# Patient Record
Sex: MALE | Race: ASIAN | NOT HISPANIC OR LATINO | Employment: FULL TIME | URBAN - METROPOLITAN AREA
[De-identification: names, ages, dates, MRNs, and addresses within clinical notes are randomized per-mention and may not be internally consistent; named-entity substitution may affect disease eponyms.]

---

## 2017-02-12 ENCOUNTER — OFFICE VISIT (OUTPATIENT)
Dept: URGENT CARE | Facility: PHYSICIAN GROUP | Age: 27
End: 2017-02-12
Payer: COMMERCIAL

## 2017-02-12 ENCOUNTER — HOSPITAL ENCOUNTER (OUTPATIENT)
Facility: MEDICAL CENTER | Age: 27
End: 2017-02-12
Attending: NURSE PRACTITIONER
Payer: COMMERCIAL

## 2017-02-12 VITALS
TEMPERATURE: 98 F | BODY MASS INDEX: 23.98 KG/M2 | OXYGEN SATURATION: 96 % | RESPIRATION RATE: 16 BRPM | WEIGHT: 177 LBS | DIASTOLIC BLOOD PRESSURE: 68 MMHG | SYSTOLIC BLOOD PRESSURE: 108 MMHG | HEIGHT: 72 IN | HEART RATE: 70 BPM

## 2017-02-12 DIAGNOSIS — Z20.2 POSSIBLE EXPOSURE TO STD: ICD-10-CM

## 2017-02-12 LAB
AMBIGUOUS DTTM AMBI4: NORMAL
APPEARANCE UR: CLEAR
BILIRUB UR STRIP-MCNC: NORMAL MG/DL
COLOR UR AUTO: YELLOW
GLUCOSE UR STRIP.AUTO-MCNC: NORMAL MG/DL
KETONES UR STRIP.AUTO-MCNC: NORMAL MG/DL
LEUKOCYTE ESTERASE UR QL STRIP.AUTO: NORMAL
NITRITE UR QL STRIP.AUTO: NORMAL
PH UR STRIP.AUTO: 6.5 [PH] (ref 5–8)
PROT UR QL STRIP: NORMAL MG/DL
RBC UR QL AUTO: NORMAL
SIGNIFICANT IND 70042: NORMAL
SOURCE SOURCE: NORMAL
SP GR UR STRIP.AUTO: 1
UROBILINOGEN UR STRIP-MCNC: NORMAL MG/DL

## 2017-02-12 PROCEDURE — 81002 URINALYSIS NONAUTO W/O SCOPE: CPT | Performed by: NURSE PRACTITIONER

## 2017-02-12 PROCEDURE — 99203 OFFICE O/P NEW LOW 30 MIN: CPT | Performed by: NURSE PRACTITIONER

## 2017-02-12 PROCEDURE — 87491 CHLMYD TRACH DNA AMP PROBE: CPT

## 2017-02-12 PROCEDURE — 87591 N.GONORRHOEAE DNA AMP PROB: CPT

## 2017-02-12 NOTE — PROGRESS NOTES
Chief Complaint   Patient presents with   • Exposure to STD     exposure to std       HISTORY OF PRESENT ILLNESS: Patient is a 26 y.o. male who presents today requesting to be tested for chlamydia. He reports he has a history of nancy chlamydia while living in [place]. Both him and his girlfriend tested positive for chlaymida two years ago and was told it was possibly contracted from dirty water conditions. He is concerned because he just spent a month there, returning this week. He was tested just prior to departure from [place], but was not given a clear result. Therefore would like to be tested and treated appropriately today. He reports he has mild amount of penile discomfort, but states this develops shortly after testing process was done in [place]. He denies associated discharge, drainage, dysuria.       There are no active problems to display for this patient.      Allergies:Review of patient's allergies indicates no known allergies.    No current Epic-ordered outpatient prescriptions on file.     No current Epic-ordered facility-administered medications on file.       History reviewed. No pertinent past medical history.    Social History   Substance Use Topics   • Smoking status: Never Smoker    • Smokeless tobacco: None   • Alcohol Use: Yes       No family status information on file.   History reviewed. No pertinent family history.    ROS:  Review of Systems   Constitutional: Negative for fever, chills, weight loss and malaise/fatigue.   HENT: Negative for ear pain, nosebleeds, congestion, sore throat and neck pain.    Neuro: Negative for headache, sensory changes, weakness, seizure, LOC.   Cardiovascular: Negative for chest pain, palpitations, orthopnea and leg swelling.   Respiratory: Negative for cough, sputum production, shortness of breath and wheezing.   Gastrointestinal: Negative for abdominal pain, nausea, vomiting or diarrhea.   Genitourinary: Positive for mild penile discomfort. Negative for  dysuria, urgency and frequency.  Musculoskeletal: Negative for falls, neck pain, back pain, joint pain, myalgias.   Skin: Negative for rash, diaphoresis.     Exam:  Blood pressure 108/68, pulse 70, temperature 36.7 °C (98 °F), resp. rate 16, height 1.829 m (6'), weight 80.287 kg (177 lb), SpO2 96 %.  General: well-nourished, well-developed male in NAD  Head: normocephalic, atraumatic  Eyes: PERRLA, no conjunctival injection, acuity grossly intact, lids normal.  Ears: normal shape and symmetry, gross auditory acuity is intact.  Nose: symmetrical without tenderness, no discharge.  Mouth/Throat: reasonable hygiene, no erythema, exudates or tonsillar enlargement.  Neck: no masses, range of motion within normal limits, no tracheal deviation. No obvious thyroid enlargement.   Lymph: no cervical adenopathy. No supraclavicular adenopathy.   Neuro: alert and oriented. Cranial nerves 1-12 grossly intact. No sensory deficit.   Cardiovascular: regular rate and rhythm. No edema.  Pulmonary: no distress. Chest is symmetrical with respiration, no wheezes, crackles, or rhonchi.   Abdomen: soft, non-tender, no guarding, no hepatosplenomegaly.  Musculoskeletal: no clubbing, appropriate muscle tone, gait is stable.  Skin: warm, dry, intact, no clubbing, no cyanosis, no rashes.   Psych: appropriate mood, affect, judgement.         Assessment/Plan:  1. Possible exposure to STD  POCT Urinalysis    CHLAMYDIA/GC PCR URINE OR SWAB       POC urine color yellow, appearance clear, leukocytes negative, nitrites negative, protein negative, pH 6.5, blood trace, specific gravity 1.005, ketones negative, bilirubin negative, glucose negative.         The patient's urine is negative for suspected infectious process. Will be sent for gonorrhea and chlamydia, will treat accordingly to results. Refrain from sexual activity until results received.  Supportive care, differential diagnoses, and indications for immediate follow-up discussed with patient.    Pathogenesis of diagnosis discussed including typical length and natural progression.   Instructed to return to clinic or nearest emergency department for any change in condition, further concerns, or worsening of symptoms.  Patient states understanding of the plan of care and discharge instructions.  Instructed to make an appointment, for follow up, with their primary care provider.        Please note that this dictation was created using voice recognition software. I have made every reasonable attempt to correct obvious errors, but I expect that there are errors of grammar and possibly content that I did not discover before finalizing the note.      MUNA Hollingsworth.         Please call s/o with results: [phone] Anna

## 2017-02-13 ENCOUNTER — TELEPHONE (OUTPATIENT)
Dept: URGENT CARE | Facility: PHYSICIAN GROUP | Age: 27
End: 2017-02-13

## 2017-02-13 LAB
C TRACH DNA GENITAL QL NAA+PROBE: NEGATIVE
N GONORRHOEA DNA GENITAL QL NAA+PROBE: NEGATIVE
SPECIMEN SOURCE: NORMAL

## 2017-02-14 NOTE — TELEPHONE ENCOUNTER
The patient was called for re-evaluation, gonorrhea and chlamydia results negative, discussed with s/o per patient's permission, encouraged to call back to the clinic or return to clinic with any questions or concerns.

## 2017-06-19 ENCOUNTER — OFFICE VISIT (OUTPATIENT)
Dept: URGENT CARE | Facility: PHYSICIAN GROUP | Age: 27
End: 2017-06-19
Payer: COMMERCIAL

## 2017-06-19 ENCOUNTER — HOSPITAL ENCOUNTER (OUTPATIENT)
Dept: RADIOLOGY | Facility: MEDICAL CENTER | Age: 27
End: 2017-06-19
Attending: PHYSICIAN ASSISTANT
Payer: COMMERCIAL

## 2017-06-19 VITALS
SYSTOLIC BLOOD PRESSURE: 120 MMHG | DIASTOLIC BLOOD PRESSURE: 80 MMHG | WEIGHT: 180 LBS | OXYGEN SATURATION: 94 % | HEIGHT: 72 IN | HEART RATE: 79 BPM | TEMPERATURE: 98.5 F | BODY MASS INDEX: 24.38 KG/M2

## 2017-06-19 DIAGNOSIS — S52.612A TRAUMATIC CLOSED FRACTURE OF ULNAR STYLOID WITH MINIMAL DISPLACEMENT, LEFT, INITIAL ENCOUNTER: Primary | ICD-10-CM
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
## 2017-06-19 DIAGNOSIS — M25.532 WRIST PAIN, LEFT: ICD-10-CM

## 2017-06-19 DIAGNOSIS — T07.XXXA ABRASIONS OF MULTIPLE SITES: ICD-10-CM

## 2017-06-19 PROCEDURE — 73110 X-RAY EXAM OF WRIST: CPT | Mod: LT

## 2017-06-19 PROCEDURE — 99214 OFFICE O/P EST MOD 30 MIN: CPT | Performed by: PHYSICIAN ASSISTANT

## 2017-06-19 RX ORDER — IBUPROFEN 200 MG
600 TABLET ORAL ONCE
Status: COMPLETED | OUTPATIENT
Start: 2017-06-19 | End: 2017-06-19

## 2017-06-19 RX ADMIN — Medication 600 MG: at 19:25

## 2017-06-19 ASSESSMENT — ENCOUNTER SYMPTOMS
TINGLING: 0
FOCAL WEAKNESS: 0
LOSS OF CONSCIOUSNESS: 0
SENSORY CHANGE: 0
NUMBNESS: 0

## 2017-06-20 NOTE — PATIENT INSTRUCTIONS
Ulnar Fracture  An ulnar fracture is a break in the ulna bone, which is the forearm bone that is located on the same side as your little finger. Your forearm is the part of your arm that is between your elbow and your wrist. It is made up of two bones: the radius and ulna. The ulna forms the point of your elbow at its upper end. The lower end can be felt on the outside of your wrist.  An ulnar fracture can happen near the wrist or elbow or in the middle of your forearm. Middle forearm fractures usually break both the radius and the ulna.  CAUSES  A heavy, direct blow to the forearm is the most common cause of an ulnar fracture. It takes a lot of force to break a bone in your forearm. This type of injury may be caused by:  · An accident, such as a car or bike accident.  · Falling with your arm outstretched.  RISK FACTORS  You may be at greater risk for an ulnar fracture if you:  · Play contact sports.  · Have a condition that causes your bones to be weak or thin (osteoporosis).  SIGNS AND SYMPTOMS   An ulnar fracture causes pain immediately after the injury. You may need to support your forearm with your other hand. Other signs and symptoms include:  · An abnormal bend or bump in your arm (deformity).  · Swelling.  · Bruising.  · Numbness or weakness in your hand.  · Inability to turn your hand from side to side (rotate).  DIAGNOSIS  Your health care provider may diagnose an ulnar fracture based on:  · Your symptoms.  · Your medical history, including any recent injury.  · A physical exam. Your health care provider will look for any deformity and feel for tenderness over the break. Your health care provider will also check whether the bone is out of place.  · An X-ray exam to confirm the diagnosis and learn more about the type of fracture.  TREATMENT  The goals of treatment are to get the bone in proper position for healing and to keep it from moving so it will heal over time. Your treatment will depend on many  factors, especially the type of fracture that you have.  · If the fractured bone:  ¨ Is in the correct position (nondisplaced), you may only need to wear a cast or a splint.  ¨ Has a slightly displaced fracture, you may need to have the bones moved back into place manually (closed reduction) before the splint or cast is put on.  · You may have a temporary splint before you have a plaster cast. The splint allows room for some swelling. After a few days, a cast can replace the splint.  ¨ You may have to wear the cast for about 6 weeks or as directed by your health care provider.  ¨ The cast may be changed after about 3 weeks or as directed by your health care provider.  · After your cast is taken off, you may need physical therapy to regain full movement in your wrist or elbow.  · You may need emergency surgery if you have:  ¨ A fractured bone that is out of position (displaced).  ¨ A fracture with multiple fragments (comminuted fracture).  ¨ A fracture that breaks the skin (open fracture). This type of fracture may require surgical wires, plates, or screws to hold the bone in place.  · You may have X-rays every couple of weeks to check on your healing.  HOME CARE INSTRUCTIONS  · Keep the injured arm above the level of your heart while you are sitting or lying down. This helps to reduce swelling and pain.  · Apply ice to the injured area:  ¨ Put ice in a plastic bag.  ¨ Place a towel between your skin and the bag.  ¨ Leave the ice on for 20 minutes, 2-3 times per day.  · Move your fingers often to avoid stiffness and to minimize swelling.  · If you have a plaster or fiberglass cast:  ¨ Do not try to scratch the skin under the cast using sharp or pointed objects.  ¨ Check the skin around the cast every day. You may put lotion on any red or sore areas.  ¨ Keep your cast dry and clean.  · If you have a plaster splint:  ¨ Wear the splint as directed.  ¨ Loosen the elastic around the splint if your fingers become numb and  tingle, or if they turn cold and blue.  · Do not put pressure on any part of your cast until it is fully hardened. Rest your cast only on a pillow for the first 24 hours.  · Protect your cast or splint while bathing or showering, as directed by your health care provider. Do not put your cast or splint into water.  · Take medicines only as directed by your health care provider.  · Return to activities, such as sports, as directed by your health care provider. Ask your health care provider what activities are safe for you.  · Keep all follow-up visits as directed by your health care provider. This is important.  SEEK MEDICAL CARE IF:  · Your pain medicine is not helping.  · Your cast gets damaged or it breaks.  · Your cast becomes loose.  · Your cast gets wet.  · You have more severe pain or swelling than you did before the cast.  · You have severe pain when stretching your fingers.  · You continue to have pain or stiffness in your elbow or your wrist after your cast is taken off.  SEEK IMMEDIATE MEDICAL CARE IF:  · You cannot move your fingers.  · You lose feeling in your fingers or your hand.  · Your hand or your fingers turn cold and pale or blue.  · You notice a bad smell coming from your cast.  · You have drainage from underneath your cast.  · You have new stains from blood or drainage seeping through your cast.     This information is not intended to replace advice given to you by your health care provider. Make sure you discuss any questions you have with your health care provider.     Document Released: 05/31/2007 Document Revised: 01/08/2016 Document Reviewed: 05/27/2015  [employer] Patient Education ©2016 [employer]

## 2017-06-20 NOTE — PROGRESS NOTES
Subjective:      KYLAH Christie is a 27 y.o. male who presents with Injury    Pt PMH, SocHx, SurgHx, FamHx, Drug allergies and medications reviewed with pt/EPIC.      Family history reviewed, it is not pertinent to this complaint.            HPI Comments: Patient presents with:  Injury: L wrist possible broken . Pt was walking his dog yesterday when it saw a rabbit and jerked the leash pulling patient down.  Pt is right handed.  PT has no hx of fracture in this extremity.           Wrist Injury   The incident occurred 12 to 24 hours ago. The incident occurred in the yard. The injury mechanism was a fall. The pain is present in the left wrist. The quality of the pain is described as aching and burning. The pain does not radiate. The pain is at a severity of 7/10. The pain has been constant since the incident. Pertinent negatives include no numbness or tingling. The symptoms are aggravated by movement, lifting and palpation. He has tried ice for the symptoms. The treatment provided mild relief.       Review of Systems   Musculoskeletal: Positive for joint pain (left wrist).   Neurological: Negative for tingling, sensory change, focal weakness, loss of consciousness and numbness.   All other systems reviewed and are negative.         Objective:     /80 mmHg  Pulse 79  Temp(Src) 36.9 °C (98.5 °F)  Ht 1.829 m (6')  Wt 81.647 kg (180 lb)  BMI 24.41 kg/m2  SpO2 94%     Physical Exam   Constitutional: He is oriented to person, place, and time. He appears well-developed and well-nourished. No distress.   HENT:   Head: Normocephalic and atraumatic.   Nose: Nose normal.   Eyes: Conjunctivae and EOM are normal. Pupils are equal, round, and reactive to light.   Neck: Normal range of motion. Neck supple. No JVD present.   Cardiovascular: Normal rate and intact distal pulses.    Pulmonary/Chest: Effort normal.   Abdominal: Soft.   Musculoskeletal:        Left wrist: He exhibits decreased range of motion, tenderness, bony  tenderness and swelling. He exhibits no effusion, no crepitus, no deformity and no laceration.   Pt unable to supinate/pronate or flex/extend secondary to pain.  Distal neuro/vascular intact.   Brisk cap refill.   4/5 secondary to pain.    Lymphadenopathy:     He has no cervical adenopathy.   Neurological: He is alert and oriented to person, place, and time.   Skin: Skin is warm and dry.          Xray images viewed and read by me, confirmed by radiology:         Impression        Acute ulnar styloid base fracture is mildly displaced         Reading Provider Reading Date     Doyle Quintero M.D. Jun 19, 2017         Signing Provider Signing Date Signing Time     Doyle Quintero M.D. Jun 19, 2017  6:31 PM          Assessment/Plan:     1. Traumatic closed fracture of ulnar styloid with minimal displacement, left, initial encounter  DX-WRIST-COMPLETE 3+ LEFT    ibuprofen (MOTRIN) tablet 600 mg   2. Fall, initial encounter  DX-WRIST-COMPLETE 3+ LEFT    ibuprofen (MOTRIN) tablet 600 mg   3. Abrasions of multiple sites  DX-WRIST-COMPLETE 3+ LEFT    ibuprofen (MOTRIN) tablet 600 mg     Orthoglass splint applied to wrist. Distal neuro/vascular intact after splint placement.     RICE TREATMENT FOR EXTREMITY INJURIES:  R-rest the extremity as much as possible while pain and swelling persist  I-ice the extremity 15 minutes every 2 hours for the first 24 hours, then 4-5 times daily   C-compress the extremity either with splint or ace wrap as directed  E-elevate the extremity to help with swelling    Motrin/Advil/Ibuprophen 600 mg every 6 hours as needed for pain or fever.    PT should follow up with Ortho 1-2 days for definitive treatment.  Discussed red flags and reasons to return to UC or ED.  Pt and/or family verbalized understanding of diagnosis and follow up instructions and was given informational handout on diagnosis.  PT discharged.

## 2018-05-08 ENCOUNTER — OFFICE VISIT (OUTPATIENT)
Dept: OTHER | Facility: IMAGING CENTER | Age: 28
End: 2018-05-08
Payer: COMMERCIAL

## 2018-05-08 VITALS
TEMPERATURE: 98.3 F | BODY MASS INDEX: 23.71 KG/M2 | OXYGEN SATURATION: 94 % | DIASTOLIC BLOOD PRESSURE: 68 MMHG | HEART RATE: 93 BPM | WEIGHT: 175.04 LBS | HEIGHT: 72 IN | SYSTOLIC BLOOD PRESSURE: 110 MMHG | RESPIRATION RATE: 12 BRPM

## 2018-05-08 DIAGNOSIS — Z13.220 SCREENING FOR LIPID DISORDERS: ICD-10-CM

## 2018-05-08 DIAGNOSIS — R04.0 EPISTAXIS, RECURRENT: ICD-10-CM

## 2018-05-08 DIAGNOSIS — R00.2 INTERMITTENT PALPITATIONS: ICD-10-CM

## 2018-05-08 DIAGNOSIS — J30.1 SEASONAL ALLERGIC RHINITIS DUE TO POLLEN: ICD-10-CM

## 2018-05-08 PROCEDURE — 99204 OFFICE O/P NEW MOD 45 MIN: CPT | Performed by: FAMILY MEDICINE

## 2018-05-08 RX ORDER — CHLORAL HYDRATE 500 MG
1000 CAPSULE ORAL
COMMUNITY
End: 2020-10-05 | Stop reason: CLARIF

## 2018-05-08 ASSESSMENT — PATIENT HEALTH QUESTIONNAIRE - PHQ9: CLINICAL INTERPRETATION OF PHQ2 SCORE: 0

## 2018-05-08 NOTE — PATIENT INSTRUCTIONS
Use 4-7-8 Relaxing Breath Exercise:  1. Place tip of your tongue against the ridge behind your front teeth and exhale completely through your mouth.  2. Inhale through your nose for a count of 4.  3. Hold your breath for a count of 7.  4. Exhale through your mouth with a swooshing sound of the count of 8.  5. Repeat this cycle three more times for a total of four breaths.

## 2018-05-08 NOTE — PROGRESS NOTES
Chief Complaint   Patient presents with   • Allergic Rhinitis   • Palpitations     every once in a while beats a lot, present since age 9     KYLAH Christie is a 27 y.o. male who usually sees no PCP in the last 6 years presents today to establish care at [facility] and to discuss chronic episodic heart racing issues.    HPI:  Epistaxis, recurrent  Patient reports episodic nose bleed that was bright red and could be clustered in occurences.  No intractable bleeding in the past and it has been able to be managed with occlusion and pressure over the anterior portion of nares.  Reports no trauma and no identifiable trigger with the exception that it happens here more than he observed when he was in [place].  He reports more thirst at night when he needs to wake up.    Seasonal allergic rhinitis due to pollen  Patient has been having chronic intermittent seasonal sneezing and running nose with the late spring and early fall.  Especially after he moved to here that he has noticed more of the sneezing especially at morning.  He also feel mouth is dry overnight.    Intermittent palpitations  Patient has experienced heart racing since he was 7 years old that he would have variable interval was off between a few minutes to a few hours of feeling of heart going to the throat. Patient also at the same time would have severe dyspnea that almost required breathing treatments. Since his teenage years, the interval of recurrence has been erratic. He could be only through the whole year without having one incidences. And the severity also changed in terms of breathing difficulty. Patient reports no longer having severe dyspneic episodes. Patient reports minimal workup in [place] while he was young. There was an EKG done 2 years ago when he was undergone anesthesia for dental procedure. Patient does not recall any abnormal results to be said from the study. Patient denies any excessive fatigue as well as dizziness and denies any relationship  between exertions and the presentation of palpitation. Patient stated that several episodes actually happens while he was laying supine with no correlation to recumbency and exercise trigger.    Depression Screening    Little interest or pleasure in doing things?  0 - not at all  Feeling down, depressed , or hopeless? 0 - not at all  Patient Health Questionnaire Score: 0    If depressive symptoms identified deferred to follow up visit unless specifically addressed in assesment and plan.      Interpretation of PHQ-9 Total Score   Score Severity   1-4 Minimal Depression   5-9 Mild Depression   10-14 Moderate Depression   15-19 Moderately Severe Depression   20-27 Severe Depression       Current medicines (including changes today)  Current Outpatient Prescriptions   Medication Sig Dispense Refill   • Omega-3 Fatty Acids (FISH OIL) 1000 MG Cap capsule Take 1,000 mg by mouth 3 times a day, with meals.       No current facility-administered medications for this visit.      He  has a past medical history of Allergy to pollen (2014); Bleeding nose (1999); Heart palpitations (1998); and Wrist fracture, bilateral.  He  has no past surgical history on file.  Social History   Substance Use Topics   • Smoking status: Never Smoker   • Smokeless tobacco: Never Used   • Alcohol use Yes      Comment: 1 - 2 beers daily      Family History   Problem Relation Age of Onset   • Arrythmia Mother      had ablation procedure 2017     No family status information on file.     Social History     Social History Narrative   • No narrative on file     ROS  Constitutional: Negative for fever, chills, weight loss and malaise/fatigue.   HENT: Negative for ear pain, nosebleeds, congestion, sore throat and neck pain.    Eyes: Negative for blurred vision.   Respiratory: Negative for cough, sputum production, shortness of breath and wheezing.    Cardiovascular: Negative for chest pain, orthopnea and leg swelling. Positive for intermittent  palpitations.  Gastrointestinal: Negative for heartburn, nausea, vomiting and abdominal pain.  Genitourinary: Negative for dysuria, urgency and frequency.   Musculoskeletal: Negative for myalgias, back pain and joint pain.   Skin: Negative for rash and itching.   Neurological: Negative for dizziness, tingling, tremors, sensory change, focal weakness and headaches.   Endo/Heme/Allergies: Does not bruise/bleed easily.   Psychiatric/Behavioral: Negative for depression, or memory loss.  Positive for anxiety.   All other systems reviewed and are negative except as in HPI.    Labs reviewed with patient:       Objective:   Blood pressure 110/68, pulse 93, temperature 36.8 °C (98.3 °F), resp. rate 12, height 1.829 m (6'), weight 79.4 kg (175 lb 0.7 oz), SpO2 94 %. Body mass index is 23.74 kg/m².  Physical Exam:  Constitutional: Alert, no distress.  Skin: Warm, dry, good turgor, no rashes in visible areas.  Eye: Equal, round and reactive, conjunctiva clear, lids normal.  ENMT: Lips without lesions, good dentition, oropharynx clear.  Neck: Trachea midline, no masses, no thyromegaly.  Respiratory: Unlabored respiratory effort, lungs clear to auscultation, no wheezes, no ronchi.  Cardiovascular: S1, S2, no murmur or bruits, no edema. tachycardia during exam but normal rate at EKG.  Abdomen: Soft, non-tender, no masses, no hepatosplenomegaly.  Psych: Alert and oriented x3, normal affect and mood.    Assessment and Plan:   The following treatment plan was discussed  1. Intermittent palpitations  CBC WITH DIFFERENTIAL    COMP METABOLIC PANEL    LIPID PROFILE    TSH    FREE THYROXINE    HEALTHY TRACKS BLOOD TESTS    EKG - Clinic performed    REFERRAL TO CARDIOLOGY    CANCELED: BIOMETRICS    EKG performed in office, no P wave irregularities and T-wave changes. No murmur auscultated.  Discussed with patient of his concern over his mother's cardiac procedure past year. Secondary to her unnamed procedure that requires resection of an  tissue from her heart that has weighted heavily with his sense of duty that needs to be there for his daughter when she grows up.  We will refer to cardiology for stress and echocardiogram to clear his worry.  Also recommend 4-7-8 breathing to use daily and when he has this episode to reduce his emotional contributions.  Also put in the healthy track lab request to see if he will qualify for his spouse's insurance discount.   2. Seasonal allergic rhinitis due to pollen      Discussed dietary changes of reduced dairy products, and use of 4-7-8 breathing exercises to assist in both the allergic rhinitis and his work stress.  Will also discuss additional issue that he has with diet during our follow up.   3. Screening for lipid disorders  LIPID PROFILE    [facility] BLOOD TESTS    CANCELED: BIOMETRICS    Discussed of the utility of lipid panels and risk and benefit of dietary change may mean to him.  Will discuss any need of interventions or lab studies.   4. Epistaxis, recurrent      Recommend to have as neede dcompression should it happen again and also use humidifiers to his home space to reduce dryness.  Also need to use maureen root and yamaimo in his cooking such as stew and also encourage cooking method to be water based heating methods such as steam and microwaves.     Records requested.  Followup: Return in about 4 weeks (around 6/5/2018).    Spent 65 minutes in face-to-tace patient contact in which greater than 50% of the visit was spent in counseling and coordination of care including anxiety management option and concerns related to his palpitations.  Answering patient questions about the nature of allergic rhinitis.  Discussing in depth the importance of primary prevention of CVD.  We also reviewed PMH, family history, and each of his chronic diagnoses in regards to current management, specialty physicians, symptom control, and future planning.  Please refer to assessment and  plan/discussion/recommendations for additional details.          I have worked with consultants from the vendor as well as technical experts from [facility] to optimize the interface. I have made every reasonable attempt to correct obvious errors, but I expect that there are errors of grammar and possibly content that I did not discover before finalizing the note.

## 2018-05-09 NOTE — ASSESSMENT & PLAN NOTE
Patient has experienced heart racing since he was 7 years old that he would have variable interval was off between a few minutes to a few hours of feeling of heart going to the throat. Patient also at the same time would have severe dyspnea that almost required breathing treatments. Since his teenage years, the interval of recurrence has been erratic. He could be only through the whole year without having one incidences. And the severity also changed in terms of breathing difficulty. Patient reports no longer having severe dyspneic episodes. Patient reports minimal workup in [place] while he was young. There was an EKG done 2 years ago when he was undergone anesthesia for dental procedure. Patient does not recall any abnormal results to be said from the study. Patient denies any excessive fatigue as well as dizziness and denies any relationship between exertions and the presentation of palpitation. Patient stated that several episodes actually happens while he was laying supine with no correlation to recumbency and exercise trigger.

## 2018-05-09 NOTE — ASSESSMENT & PLAN NOTE
Patient has been having chronic intermittent seasonal sneezing and running nose with the late spring and early fall.  Especially after he moved to here that he has noticed more of the sneezing especially at morning.  He also feel mouth is dry overnight.

## 2018-05-09 NOTE — ASSESSMENT & PLAN NOTE
Patient reports episodic nose bleed that was bright red and could be clustered in occurences.  No intractable bleeding in the past and it has been able to be managed with occlusion and pressure over the anterior portion of nares.  Reports no trauma and no identifiable trigger with the exception that it happens here more than he observed when he was in [place].  He reports more thirst at night when he needs to wake up.

## 2018-05-21 ENCOUNTER — HOSPITAL ENCOUNTER (OUTPATIENT)
Dept: LAB | Facility: MEDICAL CENTER | Age: 28
End: 2018-05-21
Payer: COMMERCIAL

## 2018-05-21 LAB
BDY FAT % MEASURED: 15.5 %
BP DIAS: 69 MMHG
BP SYS: 97 MMHG
CHOLEST SERPL-MCNC: 185 MG/DL (ref 100–199)
DIABETES HTDIA: NO
EVENT NAME HTEVT: NORMAL
FASTING HTFAS: YES
GLUCOSE SERPL-MCNC: 95 MG/DL (ref 65–99)
HDLC SERPL-MCNC: 50 MG/DL
HYPERTENSION HTHYP: NO
LDLC SERPL CALC-MCNC: 105 MG/DL
SCREENING LOC CITY HTCIT: NORMAL
SCREENING LOC STATE HTSTA: NORMAL
SCREENING LOCATION HTLOC: NORMAL
SMOKING HTSMO: NO
SUBSCRIBER ID HTSID: NORMAL
TRIGL SERPL-MCNC: 148 MG/DL (ref 0–149)

## 2018-05-21 PROCEDURE — 80061 LIPID PANEL: CPT

## 2018-05-21 PROCEDURE — S5190 WELLNESS ASSESSMENT BY NONPH: HCPCS

## 2018-05-21 PROCEDURE — 36415 COLL VENOUS BLD VENIPUNCTURE: CPT

## 2018-05-21 PROCEDURE — 82947 ASSAY GLUCOSE BLOOD QUANT: CPT

## 2018-05-22 ENCOUNTER — TELEPHONE (OUTPATIENT)
Dept: OTHER | Facility: IMAGING CENTER | Age: 28
End: 2018-05-22

## 2018-05-22 NOTE — TELEPHONE ENCOUNTER
----- Message from Hitesh Gonzalez D.O. sent at 5/21/2018  1:39 PM PDT -----  The LDL is a little over target, would encourage continue with the antiinflammatory diet change that we discussed during the visit.

## 2018-07-02 ENCOUNTER — APPOINTMENT (OUTPATIENT)
Dept: OTHER | Facility: IMAGING CENTER | Age: 28
End: 2018-07-02

## 2019-03-30 ENCOUNTER — HOSPITAL ENCOUNTER (OUTPATIENT)
Dept: LAB | Facility: MEDICAL CENTER | Age: 29
End: 2019-03-30
Payer: COMMERCIAL

## 2019-03-30 LAB
BDY FAT % MEASURED: 10.3 %
BP DIAS: 54 MMHG
BP SYS: 116 MMHG
CHOLEST SERPL-MCNC: 161 MG/DL (ref 100–199)
DIABETES HTDIA: NO
EVENT NAME HTEVT: NORMAL
FASTING HTFAS: YES
GLUCOSE SERPL-MCNC: 93 MG/DL (ref 65–99)
HDLC SERPL-MCNC: 56 MG/DL
HYPERTENSION HTHYP: NO
LDLC SERPL CALC-MCNC: 85 MG/DL
SCREENING LOC CITY HTCIT: NORMAL
SCREENING LOC STATE HTSTA: NORMAL
SCREENING LOCATION HTLOC: NORMAL
SMOKING HTSMO: NO
SUBSCRIBER ID HTSID: NORMAL
TRIGL SERPL-MCNC: 101 MG/DL (ref 0–149)

## 2019-03-30 PROCEDURE — 36415 COLL VENOUS BLD VENIPUNCTURE: CPT

## 2019-03-30 PROCEDURE — 80061 LIPID PANEL: CPT

## 2019-03-30 PROCEDURE — S5190 WELLNESS ASSESSMENT BY NONPH: HCPCS

## 2019-03-30 PROCEDURE — 82947 ASSAY GLUCOSE BLOOD QUANT: CPT

## 2020-08-28 ENCOUNTER — APPOINTMENT (OUTPATIENT)
Dept: MEDICAL GROUP | Facility: IMAGING CENTER | Age: 30
End: 2020-08-28

## 2020-09-18 ENCOUNTER — APPOINTMENT (OUTPATIENT)
Dept: MEDICAL GROUP | Facility: IMAGING CENTER | Age: 30
End: 2020-09-18

## 2020-10-05 ENCOUNTER — HOSPITAL ENCOUNTER (OUTPATIENT)
Dept: RADIOLOGY | Facility: MEDICAL CENTER | Age: 30
End: 2020-10-05
Attending: NURSE PRACTITIONER
Payer: COMMERCIAL

## 2020-10-05 ENCOUNTER — OFFICE VISIT (OUTPATIENT)
Dept: URGENT CARE | Facility: PHYSICIAN GROUP | Age: 30
End: 2020-10-05
Payer: COMMERCIAL

## 2020-10-05 ENCOUNTER — APPOINTMENT (OUTPATIENT)
Dept: RADIOLOGY | Facility: MEDICAL CENTER | Age: 30
DRG: 193 | End: 2020-10-05
Attending: EMERGENCY MEDICINE
Payer: COMMERCIAL

## 2020-10-05 ENCOUNTER — HOSPITAL ENCOUNTER (INPATIENT)
Facility: MEDICAL CENTER | Age: 30
LOS: 2 days | DRG: 193 | End: 2020-10-07
Attending: EMERGENCY MEDICINE | Admitting: HOSPITALIST
Payer: COMMERCIAL

## 2020-10-05 VITALS
OXYGEN SATURATION: 92 % | HEART RATE: 112 BPM | BODY MASS INDEX: 24.41 KG/M2 | TEMPERATURE: 103.9 F | RESPIRATION RATE: 48 BRPM | WEIGHT: 180 LBS | SYSTOLIC BLOOD PRESSURE: 124 MMHG | DIASTOLIC BLOOD PRESSURE: 76 MMHG

## 2020-10-05 DIAGNOSIS — Z20.822 SUSPECTED COVID-19 VIRUS INFECTION: ICD-10-CM

## 2020-10-05 DIAGNOSIS — R06.02 SOB (SHORTNESS OF BREATH): ICD-10-CM

## 2020-10-05 PROBLEM — J96.01 ACUTE RESPIRATORY FAILURE WITH HYPOXIA (HCC): Status: ACTIVE | Noted: 2020-10-05

## 2020-10-05 LAB
ALBUMIN SERPL BCP-MCNC: 4 G/DL (ref 3.2–4.9)
ALBUMIN/GLOB SERPL: 1.3 G/DL
ALP SERPL-CCNC: 34 U/L (ref 30–99)
ALT SERPL-CCNC: 29 U/L (ref 2–50)
ANION GAP SERPL CALC-SCNC: 15 MMOL/L (ref 7–16)
APTT PPP: 40.6 SEC (ref 24.7–36)
AST SERPL-CCNC: 29 U/L (ref 12–45)
BASOPHILS # BLD AUTO: 0 % (ref 0–1.8)
BASOPHILS # BLD: 0 K/UL (ref 0–0.12)
BILIRUB SERPL-MCNC: 0.4 MG/DL (ref 0.1–1.5)
BUN SERPL-MCNC: 16 MG/DL (ref 8–22)
CALCIUM SERPL-MCNC: 8.7 MG/DL (ref 8.5–10.5)
CHLORIDE SERPL-SCNC: 98 MMOL/L (ref 96–112)
CK SERPL-CCNC: 199 U/L (ref 0–154)
CO2 SERPL-SCNC: 20 MMOL/L (ref 20–33)
COVID ORDER STATUS COVID19: NORMAL
CREAT SERPL-MCNC: 1.11 MG/DL (ref 0.5–1.4)
D DIMER PPP IA.FEU-MCNC: 0.72 UG/ML (FEU) (ref 0–0.5)
EOSINOPHIL # BLD AUTO: 0 K/UL (ref 0–0.51)
EOSINOPHIL NFR BLD: 0 % (ref 0–6.9)
ERYTHROCYTE [DISTWIDTH] IN BLOOD BY AUTOMATED COUNT: 40.7 FL (ref 35.9–50)
FERRITIN SERPL-MCNC: 718 NG/ML (ref 22–322)
GLOBULIN SER CALC-MCNC: 3.1 G/DL (ref 1.9–3.5)
GLUCOSE SERPL-MCNC: 98 MG/DL (ref 65–99)
HCT VFR BLD AUTO: 39.2 % (ref 42–52)
HGB BLD-MCNC: 12.9 G/DL (ref 14–18)
IMM GRANULOCYTES # BLD AUTO: 0.02 K/UL (ref 0–0.11)
IMM GRANULOCYTES NFR BLD AUTO: 0.4 % (ref 0–0.9)
LACTATE BLD-SCNC: 1.1 MMOL/L (ref 0.5–2)
LACTATE BLD-SCNC: 1.2 MMOL/L (ref 0.5–2)
LDH SERPL L TO P-CCNC: 240 U/L (ref 107–266)
LYMPHOCYTES # BLD AUTO: 1.33 K/UL (ref 1–4.8)
LYMPHOCYTES NFR BLD: 26.9 % (ref 22–41)
MCH RBC QN AUTO: 27.7 PG (ref 27–33)
MCHC RBC AUTO-ENTMCNC: 32.9 G/DL (ref 33.7–35.3)
MCV RBC AUTO: 84.1 FL (ref 81.4–97.8)
MONOCYTES # BLD AUTO: 0.27 K/UL (ref 0–0.85)
MONOCYTES NFR BLD AUTO: 5.5 % (ref 0–13.4)
NEUTROPHILS # BLD AUTO: 3.33 K/UL (ref 1.82–7.42)
NEUTROPHILS NFR BLD: 67.2 % (ref 44–72)
NRBC # BLD AUTO: 0 K/UL
NRBC BLD-RTO: 0 /100 WBC
NT-PROBNP SERPL IA-MCNC: 17 PG/ML (ref 0–125)
PLATELET # BLD AUTO: 244 K/UL (ref 164–446)
PMV BLD AUTO: 9.4 FL (ref 9–12.9)
POTASSIUM SERPL-SCNC: 3.5 MMOL/L (ref 3.6–5.5)
PROT SERPL-MCNC: 7.1 G/DL (ref 6–8.2)
RBC # BLD AUTO: 4.66 M/UL (ref 4.7–6.1)
SARS-COV-2 RNA RESP QL NAA+PROBE: NOTDETECTED
SODIUM SERPL-SCNC: 133 MMOL/L (ref 135–145)
SPECIMEN SOURCE: NORMAL
TROPONIN T SERPL-MCNC: 9 NG/L (ref 6–19)
WBC # BLD AUTO: 5 K/UL (ref 4.8–10.8)

## 2020-10-05 PROCEDURE — C9803 HOPD COVID-19 SPEC COLLECT: HCPCS | Performed by: EMERGENCY MEDICINE

## 2020-10-05 PROCEDURE — 85379 FIBRIN DEGRADATION QUANT: CPT

## 2020-10-05 PROCEDURE — 84484 ASSAY OF TROPONIN QUANT: CPT

## 2020-10-05 PROCEDURE — A9270 NON-COVERED ITEM OR SERVICE: HCPCS | Performed by: EMERGENCY MEDICINE

## 2020-10-05 PROCEDURE — 770001 HCHG ROOM/CARE - MED/SURG/GYN PRIV*

## 2020-10-05 PROCEDURE — 85730 THROMBOPLASTIN TIME PARTIAL: CPT

## 2020-10-05 PROCEDURE — 83605 ASSAY OF LACTIC ACID: CPT | Mod: 91

## 2020-10-05 PROCEDURE — 82728 ASSAY OF FERRITIN: CPT

## 2020-10-05 PROCEDURE — U0003 INFECTIOUS AGENT DETECTION BY NUCLEIC ACID (DNA OR RNA); SEVERE ACUTE RESPIRATORY SYNDROME CORONAVIRUS 2 (SARS-COV-2) (CORONAVIRUS DISEASE [COVID-19]), AMPLIFIED PROBE TECHNIQUE, MAKING USE OF HIGH THROUGHPUT TECHNOLOGIES AS DESCRIBED BY CMS-2020-01-R: HCPCS

## 2020-10-05 PROCEDURE — 85025 COMPLETE CBC W/AUTO DIFF WBC: CPT

## 2020-10-05 PROCEDURE — 85652 RBC SED RATE AUTOMATED: CPT

## 2020-10-05 PROCEDURE — 80053 COMPREHEN METABOLIC PANEL: CPT

## 2020-10-05 PROCEDURE — 99203 OFFICE O/P NEW LOW 30 MIN: CPT | Mod: CS | Performed by: NURSE PRACTITIONER

## 2020-10-05 PROCEDURE — 87040 BLOOD CULTURE FOR BACTERIA: CPT | Mod: 91

## 2020-10-05 PROCEDURE — 99285 EMERGENCY DEPT VISIT HI MDM: CPT

## 2020-10-05 PROCEDURE — 83880 ASSAY OF NATRIURETIC PEPTIDE: CPT

## 2020-10-05 PROCEDURE — 82550 ASSAY OF CK (CPK): CPT

## 2020-10-05 PROCEDURE — 99222 1ST HOSP IP/OBS MODERATE 55: CPT | Performed by: HOSPITALIST

## 2020-10-05 PROCEDURE — 84145 PROCALCITONIN (PCT): CPT

## 2020-10-05 PROCEDURE — 83615 LACTATE (LD) (LDH) ENZYME: CPT

## 2020-10-05 PROCEDURE — 700102 HCHG RX REV CODE 250 W/ 637 OVERRIDE(OP): Performed by: EMERGENCY MEDICINE

## 2020-10-05 PROCEDURE — 71045 X-RAY EXAM CHEST 1 VIEW: CPT

## 2020-10-05 RX ORDER — ACETAMINOPHEN 325 MG/1
650 TABLET ORAL EVERY 6 HOURS PRN
Status: DISCONTINUED | OUTPATIENT
Start: 2020-10-05 | End: 2020-10-07 | Stop reason: HOSPADM

## 2020-10-05 RX ORDER — ACETAMINOPHEN 325 MG/1
650 TABLET ORAL ONCE
Status: COMPLETED | OUTPATIENT
Start: 2020-10-05 | End: 2020-10-05

## 2020-10-05 RX ORDER — IBUPROFEN 600 MG/1
600 TABLET ORAL ONCE
Status: COMPLETED | OUTPATIENT
Start: 2020-10-05 | End: 2020-10-05

## 2020-10-05 RX ORDER — ONDANSETRON 2 MG/ML
4 INJECTION INTRAMUSCULAR; INTRAVENOUS EVERY 6 HOURS PRN
Status: DISCONTINUED | OUTPATIENT
Start: 2020-10-05 | End: 2020-10-07 | Stop reason: HOSPADM

## 2020-10-05 RX ORDER — GUAIFENESIN 600 MG/1
600 TABLET, EXTENDED RELEASE ORAL 2 TIMES DAILY PRN
Status: DISCONTINUED | OUTPATIENT
Start: 2020-10-05 | End: 2020-10-07 | Stop reason: HOSPADM

## 2020-10-05 RX ORDER — OXYCODONE HYDROCHLORIDE 5 MG/1
5 TABLET ORAL EVERY 6 HOURS PRN
Status: DISCONTINUED | OUTPATIENT
Start: 2020-10-05 | End: 2020-10-07 | Stop reason: HOSPADM

## 2020-10-05 RX ORDER — ONDANSETRON 4 MG/1
4 TABLET, ORALLY DISINTEGRATING ORAL EVERY 6 HOURS PRN
Status: DISCONTINUED | OUTPATIENT
Start: 2020-10-05 | End: 2020-10-07 | Stop reason: HOSPADM

## 2020-10-05 RX ORDER — OXYCODONE HYDROCHLORIDE 5 MG/1
2.5 TABLET ORAL EVERY 6 HOURS PRN
Status: DISCONTINUED | OUTPATIENT
Start: 2020-10-05 | End: 2020-10-07 | Stop reason: HOSPADM

## 2020-10-05 RX ORDER — ALBUTEROL SULFATE 90 UG/1
2 AEROSOL, METERED RESPIRATORY (INHALATION) EVERY 6 HOURS PRN
Qty: 8.5 G | Refills: 0 | Status: SHIPPED
Start: 2020-10-05 | End: 2020-10-05

## 2020-10-05 RX ORDER — HYDROMORPHONE HYDROCHLORIDE 1 MG/ML
0.25 INJECTION, SOLUTION INTRAMUSCULAR; INTRAVENOUS; SUBCUTANEOUS EVERY 6 HOURS PRN
Status: DISCONTINUED | OUTPATIENT
Start: 2020-10-05 | End: 2020-10-07 | Stop reason: HOSPADM

## 2020-10-05 RX ADMIN — IBUPROFEN 600 MG: 600 TABLET ORAL at 20:00

## 2020-10-05 RX ADMIN — ACETAMINOPHEN 650 MG: 325 TABLET, FILM COATED ORAL at 20:00

## 2020-10-05 ASSESSMENT — ENCOUNTER SYMPTOMS
VOMITING: 0
MYALGIAS: 1
SORE THROAT: 0
DIARRHEA: 0
COUGH: 1
WHEEZING: 0
CHILLS: 1
HEADACHES: 0
FEVER: 1
DIZZINESS: 0
ABDOMINAL PAIN: 0
NAUSEA: 0
SHORTNESS OF BREATH: 1

## 2020-10-06 LAB
COVID ORDER STATUS COVID19: NORMAL
ERYTHROCYTE [SEDIMENTATION RATE] IN BLOOD BY WESTERGREN METHOD: 41 MM/HOUR (ref 0–15)
PROCALCITONIN SERPL-MCNC: 0.07 NG/ML
SARS-COV+SARS-COV-2 AG RESP QL IA.RAPID: NOTDETECTED
SPECIMEN SOURCE: NORMAL

## 2020-10-06 PROCEDURE — 770020 HCHG ROOM/CARE - TELE (206)

## 2020-10-06 PROCEDURE — 87426 SARSCOV CORONAVIRUS AG IA: CPT

## 2020-10-06 PROCEDURE — 700102 HCHG RX REV CODE 250 W/ 637 OVERRIDE(OP): Performed by: HOSPITALIST

## 2020-10-06 PROCEDURE — 99232 SBSQ HOSP IP/OBS MODERATE 35: CPT | Performed by: STUDENT IN AN ORGANIZED HEALTH CARE EDUCATION/TRAINING PROGRAM

## 2020-10-06 PROCEDURE — 700111 HCHG RX REV CODE 636 W/ 250 OVERRIDE (IP): Performed by: HOSPITALIST

## 2020-10-06 PROCEDURE — A9270 NON-COVERED ITEM OR SERVICE: HCPCS | Performed by: HOSPITALIST

## 2020-10-06 PROCEDURE — C9803 HOPD COVID-19 SPEC COLLECT: HCPCS | Performed by: STUDENT IN AN ORGANIZED HEALTH CARE EDUCATION/TRAINING PROGRAM

## 2020-10-06 RX ADMIN — ACETAMINOPHEN 650 MG: 325 TABLET, FILM COATED ORAL at 11:47

## 2020-10-06 RX ADMIN — GUAIFENESIN 600 MG: 600 TABLET, EXTENDED RELEASE ORAL at 12:26

## 2020-10-06 RX ADMIN — ACETAMINOPHEN 650 MG: 325 TABLET, FILM COATED ORAL at 19:21

## 2020-10-06 RX ADMIN — ENOXAPARIN SODIUM 40 MG: 40 INJECTION SUBCUTANEOUS at 06:20

## 2020-10-06 ASSESSMENT — COGNITIVE AND FUNCTIONAL STATUS - GENERAL
MOBILITY SCORE: 24
SUGGESTED CMS G CODE MODIFIER MOBILITY: CH
SUGGESTED CMS G CODE MODIFIER DAILY ACTIVITY: CH
DAILY ACTIVITIY SCORE: 24

## 2020-10-06 ASSESSMENT — ENCOUNTER SYMPTOMS
PALPITATIONS: 0
MYALGIAS: 0
HEARTBURN: 0
VOMITING: 0
CHILLS: 1
ABDOMINAL PAIN: 0
FEVER: 1
NAUSEA: 0
SPUTUM PRODUCTION: 1
DIZZINESS: 0
COUGH: 1
DEPRESSION: 0
HEADACHES: 0
FOCAL WEAKNESS: 0
NECK PAIN: 0
SORE THROAT: 0
SHORTNESS OF BREATH: 1

## 2020-10-06 ASSESSMENT — LIFESTYLE VARIABLES
HAVE YOU EVER FELT YOU SHOULD CUT DOWN ON YOUR DRINKING: NO
AVERAGE NUMBER OF DAYS PER WEEK YOU HAVE A DRINK CONTAINING ALCOHOL: 4
DOES PATIENT WANT TO STOP DRINKING: NO
TOTAL SCORE: 0
TOTAL SCORE: 0
CONSUMPTION TOTAL: NEGATIVE
EVER FELT BAD OR GUILTY ABOUT YOUR DRINKING: NO
ALCOHOL_USE: YES
HOW MANY TIMES IN THE PAST YEAR HAVE YOU HAD 5 OR MORE DRINKS IN A DAY: 0
ON A TYPICAL DAY WHEN YOU DRINK ALCOHOL HOW MANY DRINKS DO YOU HAVE: 2
EVER HAD A DRINK FIRST THING IN THE MORNING TO STEADY YOUR NERVES TO GET RID OF A HANGOVER: NO
HAVE PEOPLE ANNOYED YOU BY CRITICIZING YOUR DRINKING: NO
TOTAL SCORE: 0

## 2020-10-06 ASSESSMENT — PAIN DESCRIPTION - PAIN TYPE
TYPE: ACUTE PAIN
TYPE: ACUTE PAIN

## 2020-10-06 ASSESSMENT — FIBROSIS 4 INDEX: FIB4 SCORE: 0.64

## 2020-10-06 ASSESSMENT — PATIENT HEALTH QUESTIONNAIRE - PHQ9
SUM OF ALL RESPONSES TO PHQ9 QUESTIONS 1 AND 2: 0
1. LITTLE INTEREST OR PLEASURE IN DOING THINGS: NOT AT ALL
2. FEELING DOWN, DEPRESSED, IRRITABLE, OR HOPELESS: NOT AT ALL

## 2020-10-06 NOTE — PROGRESS NOTES
Patient arrived on unit via bed with 2 telemetry RNs.  Was able to ambulate self into bed, tolerated well no use of assistive devices.  Patient has been oriented to surroundings including use of call light to alert staff of needs, he verbalizes understanding. Bed is locked and in the lowest position, call light within reach.

## 2020-10-06 NOTE — PROGRESS NOTES
2 RN skin check completed with Tania RN  Devices in place none.  Patients overall skin is intact with no evidence of skin breakdown noted throughout.  The following interventions in place pt provided with pillows for support and positioning, is able to and has been encouraged to turn self from side to side atleast Q2H

## 2020-10-06 NOTE — PROGRESS NOTES
Subjective:     KYLAH Christie  is a 30 y.o. male who presents for Cough (hard to breathe,feels hot,fatigue,no energy,x 1 week)       HPI   30-year-old male patient reports urgent care for a week of symptoms including dry cough, shortness of breath, fatigue, no energy.  Has not taken anything over-the-counter for symptoms.  Denies headache, nausea, vomiting or diarrhea.    Review of Systems   Constitutional: Positive for chills, fever and malaise/fatigue.   HENT: Negative for sore throat.    Respiratory: Positive for cough and shortness of breath. Negative for wheezing.    Gastrointestinal: Negative for abdominal pain, diarrhea, nausea and vomiting.   Musculoskeletal: Positive for myalgias.   Neurological: Negative for dizziness and headaches.     Past Medical History:   Diagnosis Date   • Allergy to pollen 2014   • Bleeding nose 1999   • Heart palpitations 1998   • Wrist fracture, bilateral     History reviewed. No pertinent surgical history.   Social History     Socioeconomic History   • Marital status:      Spouse name: Not on file   • Number of children: Not on file   • Years of education: Not on file   • Highest education level: Not on file   Occupational History   • Not on file   Social Needs   • Financial resource strain: Not on file   • Food insecurity     Worry: Not on file     Inability: Not on file   • Transportation needs     Medical: Not on file     Non-medical: Not on file   Tobacco Use   • Smoking status: Never Smoker   • Smokeless tobacco: Never Used   Substance and Sexual Activity   • Alcohol use: Yes     Comment: 1 - 2 beers daily    • Drug use: No   • Sexual activity: Yes     Partners: Female   Lifestyle   • Physical activity     Days per week: Not on file     Minutes per session: Not on file   • Stress: Not on file   Relationships   • Social connections     Talks on phone: Not on file     Gets together: Not on file     Attends Hoahaoism service: Not on file     Active member of club or  organization: Not on file     Attends meetings of clubs or organizations: Not on file     Relationship status: Not on file   • Intimate partner violence     Fear of current or ex partner: Not on file     Emotionally abused: Not on file     Physically abused: Not on file     Forced sexual activity: Not on file   Other Topics Concern   • Not on file   Social History Narrative   • Not on file    Patient has no known allergies.     Objective:   /76 (BP Location: Left arm, Patient Position: Sitting, BP Cuff Size: Adult)   Pulse (!) 112   Temp (!) 39.9 °C (103.9 °F) (Temporal)   Resp (!) 48   Wt 81.6 kg (180 lb)   SpO2 92%   BMI 24.41 kg/m²   Physical Exam  Vitals signs reviewed.   HENT:      Right Ear: Tympanic membrane, ear canal and external ear normal.      Left Ear: Tympanic membrane, ear canal and external ear normal.      Nose: Nose normal.      Mouth/Throat:      Lips: Pink.      Mouth: Mucous membranes are moist.      Pharynx: Uvula midline.   Cardiovascular:      Rate and Rhythm: Regular rhythm. Tachycardia present.      Heart sounds: Normal heart sounds.   Pulmonary:      Effort: Pulmonary effort is normal.      Breath sounds: Rales present.      Comments: tachypnea  Abdominal:      General: Abdomen is flat. Bowel sounds are normal.      Palpations: Abdomen is soft.   Neurological:      Mental Status: He is alert and oriented to person, place, and time.   Psychiatric:         Mood and Affect: Mood normal.         Behavior: Behavior normal.         Thought Content: Thought content normal.         Judgment: Judgment normal.          Assessment/Plan:   1. Suspected COVID-19 virus infection    2. SOB (shortness of breath)    Oxygen test while ambulation for 60 seconds performed and patient was bouncing between 86-88% on room air. He is visibly SOB while speaking and is presenting very acute looking with high fever, tachypnea and tachycardia. This coupled with the lung sounds is concerning for COVID and  due to his presentation, I would like him to go to the emergency department for further work up, evaluation and possible treatment. Patient's wife was called and she will take him via private vehicle to [facility]. WIL charge informed and expecting patient. I feel like he is stable enough to travel via private vehicle as Sp02 is 92 percent on room air, however, I did offer transfer via ambulance.     Patient is mandarin speaking and interpreting service was used for the entirety of visit.     Please note that this dictation was created using voice recognition software. I have made every reasonable attempt to correct obvious errors, but I expect that there are errors of grammar and possibly content that I did not discover before finalizing the note.    Note electronically signed by JERILYN Nguyễn

## 2020-10-06 NOTE — PROGRESS NOTES
Hospital Medicine Daily Progress Note    Date of Service  10/6/2020    Chief Complaint  30 y.o. male presented with hypoxia and tachypnea admitted 10/5/2020 with atypical bilateral pneumonia suspected of COVID-19 infection    Hospital Course    30 y.o. male who presents to the emergency department.  He has a dry cough fever and chills; he has felt this way for about a week.  He states he does have some shortness of breath.  He has no known cold exposure but did travel recently to [facility] for vacation.  He also c/o chills, fever, fatigue, cough shortness of breath and myalgias.  He has not had vomiting or diarrhea.  Upon arrival, temperature of 104 with a respiratory rate of 48 and saturations of 92% on room air, when the patient was ambulated, his saturations went to 86 to 88% on room air. CXR was with b/l patchy infiltrates. Covid PCR was negative on admission. Pt was then admitted for management and monitoring of atypical pneumonia with possible Covid-19 infection.       Interval Problem Update   services were used in the patient's primary language of Mandarin.     Name or Number: [id number]  Mode of interpretation: iPad  Content of Interpretation: Providing care and treatment plan    At bedside, he complains of feeling tired, muscle ache and general sense of feeling weak.  He mentioned that he went to the trip with his family member including his parents and his children.  None of them had symptoms or signs of feeling ill.  Patient was then explained about labs and CXR -high suspiciom for COVID-19 infection.  Management plan explained.    Vitals -T-max one 101.3 this PM; off oxygen saturating above 90% in room air    Labs reviewed -noted to have inflammatory markers elevated though initial COVID PCR was negative  CBC with no leukocytosis and no leukopenia      Consultants/Specialty  N/A    Code Status  Full Code    Disposition  Likely home    Review of Systems  Review of Systems    Constitutional: Positive for chills and fever. Negative for malaise/fatigue.   HENT: Negative for congestion and sore throat.    Respiratory: Positive for cough, sputum production and shortness of breath (upon ambulation).    Cardiovascular: Negative for chest pain, palpitations and leg swelling.   Gastrointestinal: Negative for abdominal pain, heartburn, nausea and vomiting.   Genitourinary: Negative for dysuria, frequency and urgency.   Musculoskeletal: Negative for myalgias and neck pain.   Neurological: Negative for dizziness, focal weakness and headaches.   Psychiatric/Behavioral: Negative for depression.        Physical Exam  Temp:  [35.9 °C (96.6 °F)-39 °C (102.2 °F)] 36.4 °C (97.6 °F)  Pulse:  [] 68  Resp:  [16-20] 17  BP: ()/(51-79) 102/66  SpO2:  [90 %-97 %] 97 %    Physical Exam  Vitals signs and nursing note reviewed.   Constitutional:       General: He is not in acute distress.     Appearance: Normal appearance. He is ill-appearing.   HENT:      Head: Normocephalic and atraumatic.      Mouth/Throat:      Mouth: Mucous membranes are dry.      Pharynx: Oropharynx is clear. No oropharyngeal exudate.   Eyes:      General: No scleral icterus.     Conjunctiva/sclera: Conjunctivae normal.   Neck:      Musculoskeletal: Normal range of motion.   Cardiovascular:      Rate and Rhythm: Normal rate and regular rhythm.      Pulses: Normal pulses.      Heart sounds: Normal heart sounds.   Pulmonary:      Effort: Pulmonary effort is normal. No respiratory distress.      Breath sounds: Rales present.   Abdominal:      General: Bowel sounds are normal. There is no distension.      Palpations: Abdomen is soft.      Tenderness: There is no abdominal tenderness.   Musculoskeletal: Normal range of motion.         General: No swelling or tenderness.   Skin:     General: Skin is warm and dry.      Capillary Refill: Capillary refill takes less than 2 seconds.      Comments: Warm to touch   Neurological:       General: No focal deficit present.      Mental Status: He is alert and oriented to person, place, and time. Mental status is at baseline.   Psychiatric:         Mood and Affect: Mood normal.         Fluids  No intake or output data in the 24 hours ending 10/06/20 0706     Laboratory  Recent Labs     10/05/20  2200   WBC 5.0   RBC 4.66*   HEMOGLOBIN 12.9*   HEMATOCRIT 39.2*   MCV 84.1   MCH 27.7   MCHC 32.9*   RDW 40.7   PLATELETCT 244   MPV 9.4     Recent Labs     10/05/20  2200   SODIUM 133*   POTASSIUM 3.5*   CHLORIDE 98   CO2 20   GLUCOSE 98   BUN 16   CREATININE 1.11   CALCIUM 8.7     Recent Labs     10/05/20  2100   APTT 40.6*           Blood culture x2 in progress  Ferritin 718  ESR 41,  D-dimer mildly elevated at 0.72  Procalcitonin 0.07    Imaging  DX-CHEST-PORTABLE (1 VIEW)   Final Result      Bilateral patchy opacities are consistent with atypical infection, possibly Covid 19           Assessment/Plan  * Acute respiratory failure with hypoxia (HCC)  Assessment & Plan  Associated with dry cough, fatigue, malaise, fever and myalgia  Recent travel to [facility] with family; denies symptoms in other family members  86% on RA when ambulating upon arrival    Supplemental O2 to be given to keep O2 >90%  Chest x-ray with elevated inflammatory markers - high suspicions for COVID-19 infections  PCR on arrival was negative; will repeat this a.m.  D-dimer less than 3, will continue with DVT PPX  Repeat ambulations O2  Procalcitonin is negative/normal, doubt superimposed bacterial infections.    Plan for DC tomorrow.          VTE prophylaxis: On Lovenox

## 2020-10-06 NOTE — CARE PLAN
Problem: Safety  Goal: Will remain free from falls  Outcome: PROGRESSING AS EXPECTED  Note: Hourly rounding.  Non-skid socks. Bed locked & in low position. Personal belongings and call light  within reach. .      Problem: Respiratory:  Goal: Respiratory status will improve  Outcome: PROGRESSING AS EXPECTED  Note: Encouraged on deep breathing exercise and the use of incentive spirometer,  on room air, O2 sat  95%

## 2020-10-06 NOTE — PROGRESS NOTES
Monitor summary: sinus rhythm rate 61-69, PA .16, QRS .10, QT .40, with HR as low as 45 non sustaining per strip from monitor room.

## 2020-10-06 NOTE — ED PROVIDER NOTES
ED Provider Note    CHIEF COMPLAINT  Chief Complaint   Patient presents with   • Shortness of Breath     3-4 days with a cough, recently went to [facility] for vacation, no known COVID exposures    • Fever     for a week, chills, not verified.        STAN Christie is a 30 y.o. male who presents to the emergency department.  He has a dry cough fever and chills, he is felt this way for about a week.  He states he does have some shortness of breath.  He has no known cold exposure but did travel recently to [facility] for vacation.  He is complaining of chills fever fatigue cough shortness of breath and myalgias.  He has not had vomiting or diarrhea.  Seen earlier today and had a temperature of 104 with a respiratory rate of 48 and saturations of 92% on room air, when the patient was ambulated his saturations went to 86 to 88% on room air    REVIEW OF SYSTEMS  As above all other systems are negative, ill-appearing  PAST MEDICAL HISTORY   has a past medical history of Allergy to pollen (2014), Bleeding nose (1999), Heart palpitations (1998), and Wrist fracture, bilateral.    SOCIAL HISTORY  Social History     Tobacco Use   • Smoking status: Never Smoker   • Smokeless tobacco: Never Used   Substance and Sexual Activity   • Alcohol use: Yes     Comment: 1 - 2 beers daily    • Drug use: No   • Sexual activity: Yes     Partners: Female       SURGICAL HISTORY  patient denies any surgical history    CURRENT MEDICATIONS  Reviewed.  See Encounter Summary.     ALLERGIES  No Known Allergies    PHYSICAL EXAM  VITAL SIGNS: /79   Pulse (!) 102   Temp (!) 39 °C (102.2 °F) (Oral)   Resp 20   Ht 1.829 m (6')   Wt 81.6 kg (179 lb 14.3 oz)   SpO2 94%   BMI 24.40 kg/m²   Constitutional: Febrile, Alert, ill appearing  HENT: Normocephalic, Bilateral external ears normal. Nose normal.   Eyes: Pupils are equal and reactive. Conjunctiva normal, non-icteric.   Thorax & Lungs: Patient has elevated respiratory rate tachypneic  with speech  Abdomen:  No gross signs of peritonitis, no pain with movement   Skin: Visualized skin is  Dry, No erythema, No rash.   Extremities:   No edema, No asymmetry  Neurologic: Alert, Grossly non-focal.   Psychiatric: Affect and Mood normal      COURSE & MEDICAL DECISION MAKING  Nursing notes and vital signs were reviewed. (See chart for details)    The patient presents to the Emergency Department with concern for COVID-19 infection.  The patient was visualized he is not hypoxic here however he was hypoxic with ambulation at his providers office earlier he does have shortness of breath.  Chest x-ray will be obtained to look for infiltrates.  Due to high risk for coinfection the nurse was a primary person obtaining history as she needed to do COVID testing.    Bilateral infiltrates consistent with COVID-19 infection were seen on chest x-ray.    Patient will have an IV and be started with COVID labs will be admitted for concern for hypoxemia that was documented earlier in the setting of probable COVID-19 infection    Patient's care will be discussed with the hospitalist  FINAL IMPRESSION  1.  Atypical bilateral pneumonia suspected COVID-19 infection  2.  Hypoxemia with ambulation               Electronically signed by: Holly Sanders M.D., 10/5/2020 7:43 PM

## 2020-10-06 NOTE — ED TRIAGE NOTES
KYLAH Christie   30 y.o. male   Chief Complaint   Patient presents with   • Shortness of Breath     3-4 days with a cough, recently went to [facility] for vacation, no known COVID exposures    • Fever     for a week, chills, not verified.       Pt amb to triage with steady gait for above complaint. Dry cough noted, increased work of breathing with talking.      Pt is alert and oriented, speaking in full sentences, follows commands and responds appropriately to questions. Pt placed in lobby. Pt educated on triage process. Pt encouraged to alert staff for any changes.    /79   Pulse (!) 110   Temp (!) 38.3 °C (100.9 °F) (Oral)   Resp 16   Ht 1.829 m (6')   Wt 81.6 kg (179 lb 14.3 oz)   SpO2 93%   BMI 24.40 kg/m²

## 2020-10-06 NOTE — H&P
Hospital Medicine History & Physical Note    Date of Service  10/5/2020    PCP: Pcp Pt States None    CC:  SOB    HPI:   This is a 30 y.o. male with recent travel to [facility], here with complaints of shortness of breath, found to be acutely hypoxic, dropping down to 86% with ambulation. Associated with dry cough. No vomiting or diarrhea. Otherwise no sick contacts that he knows of. COVID pending. CBC and CMP and D-dimer ordered as well given recent travel.     CXR reviewed, some faint left sided infiltrate, L>R    Consultants:   None    ROS: As above. The remainder of a complete review of systems is negative in all systems except as noted.    PMHx:   has a past medical history of Allergy to pollen (2014), Bleeding nose (1999), Heart palpitations (1998), and Wrist fracture, bilateral.    PSHx:   has no past surgical history on file.    SHx:   reports that he has never smoked. He has never used smokeless tobacco. He reports current alcohol use. He reports that he does not use drugs.    FHx:  Reviewed with patient, no pertinent family history noted.    Allergies:  No Known Allergies    Medications:  No current facility-administered medications on file prior to encounter.      No current outpatient medications on file prior to encounter.       Objective Exam:  Vitals:    10/05/20 2000 10/05/20 2043 10/05/20 2051 10/05/20 2100   BP: 116/67   104/62   Pulse: 99 97  94   Resp:   20    Temp:   (!) 38.7 °C (101.7 °F)    TempSrc:       SpO2: 93% 94%  91%   Weight:       Height:           Physical Exam   Constitutional: He is oriented to person, place, and time. He appears well-developed and well-nourished. No distress.   HENT:   Head: Normocephalic and atraumatic.   Mouth/Throat: Oropharynx is clear and moist.   Eyes: Conjunctivae are normal. No scleral icterus.   Neck: Neck supple.   Cardiovascular: Normal rate and intact distal pulses.   Pulmonary/Chest: Effort normal. No stridor. No respiratory distress. He has no  wheezes. He has no rales.   Abdominal: Soft. Bowel sounds are normal. He exhibits no distension. There is no abdominal tenderness. There is no rebound and no guarding.   Musculoskeletal:         General: No tenderness or edema.   Neurological: He is alert and oriented to person, place, and time. Coordination normal.   Skin: Skin is warm and dry. He is not diaphoretic. No erythema.   Psychiatric: He has a normal mood and affect. His behavior is normal.   Nursing note and vitals reviewed.      Laboratory--reviewed personally and are as follows:  No results found for: WBC, RBC, HEMOGLOBIN, HEMATOCRIT, MCV, MCH, MCHC, MPV, NEUTSPOLYS, LYMPHOCYTES, MONOCYTES, EOSINOPHILS, BASOPHILS, HYPOCHROMIA, ANISOCYTOSIS   Lab Results   Component Value Date/Time    GLUCOSE 93 03/30/2019 10:51 AM      No results found for: PROTHROMBTM, INR     Radiology  DX-CHEST-PORTABLE (1 VIEW)   Final Result      Bilateral patchy opacities are consistent with atypical infection, possibly Covid 19          Assessment/Plan:  * Acute respiratory failure with hypoxia (HCC)  Assessment & Plan  Associated with dry cough  Recent travel to [facility]  86% on RA when ambulating    Admit patient  Supplemental O2 to be given  R/o COVID  Check CBC CMP and D-dimer, rule out PE         It is expected patient will stay beyond 2 midnights.    VTE prophylaxis: lmwh

## 2020-10-06 NOTE — PROGRESS NOTES
30-year-old with cough fever chills and hypoxia after recent travel to [place].  Chest x-ray concerning for COVID-19      Dr. Davis will evaluate patient for admission

## 2020-10-06 NOTE — ASSESSMENT & PLAN NOTE
Associated with dry cough, fatigue, malaise, fever and myalgia  Recent travel to [facility] with family; denies symptoms in other family members  86% on RA when ambulating upon arrival    Supplemental O2 to be given to keep O2 >90%  Chest x-ray with elevated inflammatory markers - high suspicions for COVID-19 infections  PCR on arrival was negative; will repeat this a.m.  D-dimer less than 3, will continue with DVT PPX  Repeat ambulations O2  Procalcitonin is negative/normal, doubt superimposed bacterial infections.    Plan for DC tomorrow.

## 2020-10-07 VITALS
OXYGEN SATURATION: 94 % | DIASTOLIC BLOOD PRESSURE: 61 MMHG | SYSTOLIC BLOOD PRESSURE: 99 MMHG | HEIGHT: 72 IN | WEIGHT: 184.3 LBS | RESPIRATION RATE: 18 BRPM | TEMPERATURE: 97.2 F | HEART RATE: 75 BPM | BODY MASS INDEX: 24.96 KG/M2

## 2020-10-07 PROCEDURE — 99239 HOSP IP/OBS DSCHRG MGMT >30: CPT | Performed by: STUDENT IN AN ORGANIZED HEALTH CARE EDUCATION/TRAINING PROGRAM

## 2020-10-07 NOTE — CARE PLAN
Problem: Infection  Goal: Will remain free from infection  Outcome: PROGRESSING SLOWER THAN EXPECTED  Intervention: Assess signs and symptoms of infection  Note: Patient with lowgrade fever upon change of shift, medicated per MAR. All other vital signs within normal limits, will continue to monitor     Problem: Discharge Barriers/Planning  Goal: Patient's continuum of care needs will be met  Outcome: PROGRESSING AS EXPECTED  Intervention: Involve patient and significant other/support system in setting and prioritizing goals for hospital stay and discharge  Note: Patient up to date in regards to plan of care including discharge plan, home o2 eval complete, pt does not require use of oxygen.

## 2020-10-07 NOTE — DISCHARGE SUMMARY
Discharge Summary    CHIEF COMPLAINT ON ADMISSION  Chief Complaint   Patient presents with   • Shortness of Breath     3-4 days with a cough, recently went to [facility] for vacation, no known COVID exposures    • Fever     for a week, chills, not verified.        Reason for Admission  Fever; Cough      Admission Date  10/5/2020    CODE STATUS  Full Code    HPI & HOSPITAL COURSE  30 y.o. male who presents to the emergency department.  He has a dry cough fever and chills; he has felt this way for about a week.  He states he does have some shortness of breath.  He has no known cold exposure but did travel recently to [facility] for vacation.  He also c/o chills, fever, fatigue, cough shortness of breath and myalgias.  He has not had vomiting or diarrhea.  Upon arrival, temperature of 104 with a respiratory rate of 48 and saturations of 92% on room air, when the patient was ambulated, his saturations went to 86 to 88% on room air. CXR was with b/l patchy infiltrates. Covid PCR was negative on admission. Pt was then admitted for management and monitoring of atypical pneumonia with possible Covid-19 infection.       10/6: He c/o feeling tired, muscle ache and general sense of feeling weak.  He mentioned that he went to the trip with his family member including his parents and his children.  None of them had symptoms or signs of feeling ill.  Patient was then explained about labs and CXR -high suspicion for COVID-19 infection.  Management plan with supportive care explained.   Noted T-max 101.3 in PM; he was off oxygen saturating above 90% in room air. Ambulatory O2 was also >90%.     Discussed with microbiology about a plan to repeat a Covid test given a high suspicion.  COVID antigen test was done which was also negative.  Given to negative test with PCR and antigen, COVID-19 infection is considered unlikely.  Patient likely experienced upper respiratory viral infections.      This morning, findings are explained.   Patient is clinically feeling a bit better.  Since stable vitals without requiring oxygen and essentially stable labs, discussed plans for DC home for which patient agreed.      Therefore, he is discharged in fair and stable condition to home with close outpatient follow-up.    The patient met 2-midnight criteria for an inpatient stay at the time of discharge.    Discharge Date  10/7/2020    FOLLOW UP ITEMS POST DISCHARGE  N/A    Physical Exam   Constitutional: He is oriented to person, place, and time. No distress.   Well developed   HENT:   Head: Normocephalic and atraumatic.   Mouth/Throat: Oropharynx is clear and moist. No oropharyngeal exudate.   Eyes: Conjunctivae and EOM are normal. No scleral icterus.   Neck: Normal range of motion. Neck supple.   Cardiovascular: Normal rate, regular rhythm and normal heart sounds.   No murmur heard.  Pulmonary/Chest: Effort normal. No respiratory distress. He has no wheezes. He has rales.   Abdominal: Soft. Bowel sounds are normal. He exhibits no distension. There is no abdominal tenderness. There is no rebound.   Musculoskeletal: Normal range of motion.         General: No tenderness or edema.   Neurological: He is alert and oriented to person, place, and time. No cranial nerve deficit. Coordination normal. GCS score is 15.   Skin: Skin is warm and dry.   Psychiatric: Affect normal.   Nursing note and vitals reviewed.        DISCHARGE DIAGNOSES  Principal Problem:    Acute respiratory failure with hypoxia (HCC) POA: Yes  Active Problems:    Seasonal allergic rhinitis due to pollen POA: Yes  Resolved Problems:    * No resolved hospital problems. *      FOLLOW UP  Your Primary Care Physician    In 2 weeks  Follow up    [facility]  [phone]    Please call to establish with a primary care physician and schedule a hospital follow up . Thank you       MEDICATIONS ON DISCHARGE     Medication List      You have not been prescribed any medications.         Allergies  No  Known Allergies    DIET  Orders Placed This Encounter   Procedures   • Diet Order Regular     Standing Status:   Standing     Number of Occurrences:   1     Order Specific Question:   Diet:     Answer:   Regular [1]       ACTIVITY  As tolerated.  Weight bearing as tolerated    CONSULTATIONS  N/A    PROCEDURES  N/A    LABORATORY  Lab Results   Component Value Date    SODIUM 133 (L) 10/05/2020    POTASSIUM 3.5 (L) 10/05/2020    CHLORIDE 98 10/05/2020    CO2 20 10/05/2020    GLUCOSE 98 10/05/2020    BUN 16 10/05/2020    CREATININE 1.11 10/05/2020        Lab Results   Component Value Date    WBC 5.0 10/05/2020    HEMOGLOBIN 12.9 (L) 10/05/2020    HEMATOCRIT 39.2 (L) 10/05/2020    PLATELETCT 244 10/05/2020        Total time of the discharge process exceeds 36 minutes.

## 2020-10-07 NOTE — DISCHARGE PLANNING
Anticipated Discharge Disposition: Home    Action: Discussed discharge planning needs with MD. Per MD, pt is medically cleared, pt on room air, AAOx4, will be discharge home with no needs. Voalte message sent to beside RN for other pt DC needs or concerns.    Barriers to Discharge: None.    Plan: Will continue to follow and assist with discharge planning needs.

## 2020-10-07 NOTE — DISCHARGE INSTRUCTIONS
Discharge Instructions    Discharged to home by car with relative. Discharged via wheelchair, hospital escort: Yes.  Special equipment needed: Not Applicable    Be sure to schedule a follow-up appointment with your primary care doctor or any specialists as instructed.     Discharge Plan:   Influenza Vaccine Indication: Patient Refuses    I understand that a diet low in cholesterol, fat, and sodium is recommended for good health. Unless I have been given specific instructions below for another diet, I accept this instruction as my diet prescription.   Other diet:     Special Instructions: None    · Is patient discharged on Warfarin / Coumadin?   No     COVID-19 testing results negative (+Respiratory symptoms)    A. Return home and continue home isolation until:     1. No fever for at least 72 hours without the use of fever reducing medications     2. Other symptoms have improved or resolved (ex: cough or shortness of breath have improved)    B. Home isolation is defined as staying at home and monitoring your symptoms.    C. Even after the above are met, practice physical distancing from others (at least 6 feet). Avoid crowded places and wear home-made face mask in public. Practice frequent hand washing.      Depression / Suicide Risk    As you are discharged from this [facility] facility, it is important to learn how to keep safe from harming yourself.    Recognize the warning signs:  · Abrupt changes in personality, positive or negative- including increase in energy   · Giving away possessions  · Change in eating patterns- significant weight changes-  positive or negative  · Change in sleeping patterns- unable to sleep or sleeping all the time   · Unwillingness or inability to communicate  · Depression  · Unusual sadness, discouragement and loneliness  · Talk of wanting to die  · Neglect of personal appearance   · Rebelliousness- reckless behavior  · Withdrawal from people/activities they love  · Confusion-  inability to concentrate     If you or a loved one observes any of these behaviors or has concerns about self-harm, here's what you can do:  · Talk about it- your feelings and reasons for harming yourself  · Remove any means that you might use to hurt yourself (examples: pills, rope, extension cords, firearm)  · Get professional help from the community (Mental Health, Substance Abuse, psychological counseling)  · Do not be alone:Call your Safe Contact- someone whom you trust who will be there for you.  · Call your local CRISIS HOTLINE 557-1336 or 484-570-6019  · Call your local Children's Mobile Crisis Response Team [place] (460) 404-0632 or www.ShieldEffect  · Call the toll free National Suicide Prevention Hotlines   · National Suicide Prevention Lifeline 234-898-YQVL (7094)  · National Hope Line Network 800-SUICIDE (492-2763)

## 2020-10-07 NOTE — CARE PLAN
Assumed day shift care at start of shift   Patient a+o x 4, denies pain  94-95% on room air  +cough - dry  Afebrile, vss, monitored on telemetry (NSR, HR in the 90's)  Patient encouraged to use IS throughout shift  No needs at this time, [facility]    GOAL: discharge today       Fall precautions/hourly rounding maintained, call light within reach and functioning, all items within reach.  Patient encouraged to call for assistance, poc reviewed with patient, ?'s/concerns answered.       Problem: Discharge Barriers/Planning  Goal: Patient's continuum of care needs will be met  Outcome: PROGRESSING AS EXPECTED     Problem: Respiratory:  Goal: Respiratory status will improve  Outcome: PROGRESSING AS EXPECTED

## 2020-10-07 NOTE — PROGRESS NOTES
Patient to be discharged today - patient aware and agreeable to plan. D/c instructions reviewed with patient - ?'s/concerns answered. Patient was covid negative x 2. Covid negative instructions included in discharge paperwork and reviewed with ipad  (mandarin).

## 2020-10-07 NOTE — PROGRESS NOTES
Patient wife Anna called asking to speak with MD, notified her MD was no longer on unit.    Asked if MD could contact her tomorrow with updates in regards to discharge plan.     Wife update in regards to current plan of care, but continues wanting to speak with MD Castillo [phone]

## 2020-10-10 LAB
BACTERIA BLD CULT: NORMAL
BACTERIA BLD CULT: NORMAL
SIGNIFICANT IND 70042: NORMAL
SIGNIFICANT IND 70042: NORMAL
SITE SITE: NORMAL
SITE SITE: NORMAL
SOURCE SOURCE: NORMAL
SOURCE SOURCE: NORMAL

## 2020-11-11 ENCOUNTER — PATIENT MESSAGE (OUTPATIENT)
Dept: MEDICAL GROUP | Facility: PHYSICIAN GROUP | Age: 30
End: 2020-11-11

## 2020-11-11 DIAGNOSIS — Z13.21 SCREENING FOR ENDOCRINE, NUTRITIONAL, METABOLIC AND IMMUNITY DISORDER: ICD-10-CM

## 2020-11-11 DIAGNOSIS — Z13.0 SCREENING FOR ENDOCRINE, NUTRITIONAL, METABOLIC AND IMMUNITY DISORDER: ICD-10-CM

## 2020-11-11 DIAGNOSIS — Z13.228 SCREENING FOR ENDOCRINE, NUTRITIONAL, METABOLIC AND IMMUNITY DISORDER: ICD-10-CM

## 2020-11-11 DIAGNOSIS — Z13.29 SCREENING FOR ENDOCRINE, NUTRITIONAL, METABOLIC AND IMMUNITY DISORDER: ICD-10-CM

## 2020-11-12 ENCOUNTER — PATIENT MESSAGE (OUTPATIENT)
Dept: MEDICAL GROUP | Facility: PHYSICIAN GROUP | Age: 30
End: 2020-11-12

## 2020-11-12 DIAGNOSIS — Z13.228 SCREENING FOR ENDOCRINE, NUTRITIONAL, METABOLIC AND IMMUNITY DISORDER: ICD-10-CM

## 2020-11-12 DIAGNOSIS — Z13.21 SCREENING FOR ENDOCRINE, NUTRITIONAL, METABOLIC AND IMMUNITY DISORDER: ICD-10-CM

## 2020-11-12 DIAGNOSIS — Z13.29 SCREENING FOR ENDOCRINE, NUTRITIONAL, METABOLIC AND IMMUNITY DISORDER: ICD-10-CM

## 2020-11-12 DIAGNOSIS — Z13.0 SCREENING FOR ENDOCRINE, NUTRITIONAL, METABOLIC AND IMMUNITY DISORDER: ICD-10-CM

## 2020-11-12 NOTE — TELEPHONE ENCOUNTER
From: KYLAH Christie  To: Johnathon Patel M.D.  Sent: 11/11/2020 6:10 PM PST  Subject: Non-Urgent Medical Question    For the appointment on Friday, can I get a flu shot and do some biometric labs? Specifically a lipid panel and comprehensive metabolic panel?

## 2020-11-13 ENCOUNTER — OFFICE VISIT (OUTPATIENT)
Dept: MEDICAL GROUP | Facility: PHYSICIAN GROUP | Age: 30
End: 2020-11-13
Payer: COMMERCIAL

## 2020-11-13 VITALS
HEIGHT: 72 IN | HEART RATE: 98 BPM | TEMPERATURE: 98.1 F | RESPIRATION RATE: 16 BRPM | WEIGHT: 187 LBS | OXYGEN SATURATION: 97 % | BODY MASS INDEX: 25.33 KG/M2 | DIASTOLIC BLOOD PRESSURE: 54 MMHG | SYSTOLIC BLOOD PRESSURE: 118 MMHG

## 2020-11-13 DIAGNOSIS — D64.9 ANEMIA, UNSPECIFIED TYPE: ICD-10-CM

## 2020-11-13 DIAGNOSIS — Z76.89 ENCOUNTER TO ESTABLISH CARE WITH NEW DOCTOR: ICD-10-CM

## 2020-11-13 DIAGNOSIS — E87.1 HYPONATREMIA: ICD-10-CM

## 2020-11-13 DIAGNOSIS — Z23 NEED FOR VACCINATION: ICD-10-CM

## 2020-11-13 DIAGNOSIS — J30.1 SEASONAL ALLERGIC RHINITIS DUE TO POLLEN: ICD-10-CM

## 2020-11-13 PROBLEM — R04.0 EPISTAXIS, RECURRENT: Status: RESOLVED | Noted: 2018-05-08 | Resolved: 2020-11-13

## 2020-11-13 PROBLEM — R00.2 INTERMITTENT PALPITATIONS: Status: RESOLVED | Noted: 2018-05-08 | Resolved: 2020-11-13

## 2020-11-13 PROCEDURE — 99214 OFFICE O/P EST MOD 30 MIN: CPT | Mod: 25 | Performed by: INTERNAL MEDICINE

## 2020-11-13 PROCEDURE — 90471 IMMUNIZATION ADMIN: CPT | Performed by: INTERNAL MEDICINE

## 2020-11-13 PROCEDURE — 90715 TDAP VACCINE 7 YRS/> IM: CPT | Performed by: INTERNAL MEDICINE

## 2020-11-13 ASSESSMENT — FIBROSIS 4 INDEX: FIB4 SCORE: 0.64

## 2020-11-13 ASSESSMENT — PATIENT HEALTH QUESTIONNAIRE - PHQ9: CLINICAL INTERPRETATION OF PHQ2 SCORE: 0

## 2020-11-13 NOTE — PROGRESS NOTES
CC: Establish care      HPI: This is a 30 y.o. pt.  Pt's medical history is notable for:     Seasonal allergic rhinitis due to pollen  This is a chronic intermittent condition.  The patient takes over-the-counter antihistamine as needed.  Currently he is asymptomatic    Anemia  Recent lab test shows slightly low hemoglobin 12.9.  Patient denies any history of bleeding.  Recommended a repeat blood test to be done including CBC iron study B12 folate and ferritin and FOBT.    Hyponatremia  Sodium level was slightly low at 133.  Repeat lab tests ordered.  Patient is currently asymptomatic.          REVIEW OF SYSTEMS:     Constitutional:  no fever / chills   Neurologic: no headaches, no numbness/tingling  Eyes: no changes in vision  ENT: no sore throat, no hearing loss  CV:  no chest pain, no palpitations  Pulmonary: no SOB, no cough    GI: no nausea / vomiting, no diarrhea, no constipation  :  no dysuria, no hematuria       Allergies: Patient has no known allergies.    No current [facility]-ordered outpatient medications on file.     No current [facility]-ordered facility-administered medications on file.        Past Medical History:   Diagnosis Date   • Allergy to pollen 2014   • Anemia 11/13/2020    Hg 12 Ordered ffib, Hg electro   • Bleeding nose 1999   • Heart palpitations 1998   • Wrist fracture, bilateral         History reviewed. No pertinent surgical history.     Family History   Problem Relation Age of Onset   • Arrythmia Mother         had ablation procedure 2017        Social History     Tobacco Use   Smoking Status Never Smoker   Smokeless Tobacco Never Used          Social History     Substance and Sexual Activity   Alcohol Use Yes    Comment: 1 - 2 beers daily          ------------------------------------------------------------------------------     PHYSICAL EXAM:   Vitals:    11/13/20 1444   BP: 118/54   Pulse: 98   Resp: 16   Temp: 36.7 °C (98.1 °F)   SpO2: 97%      Body mass index is 25.36 kg/m².          Constitutional: no acute distress  Neck: supple, no JVD  CV: heart RRR  Resp: normal effort, no wheezing or rales.  GI: abdomen soft, no obvious mass, no tenderness  Neuro: CN 2-12 grossly intact        -----------------------------------------------------------------------------    ASSESSMENT:   1. Anemia, unspecified type  FERRITIN    OCCULT BLOOD FECES IMMUNOASSAY    FOLATE    IRON    CBC WITH DIFFERENTIAL   2. Encounter to establish care with new doctor  HEMOGLOBIN A1C    ALANINE AMINO-TRANS    Basic Metabolic Panel    Lipid Profile    TSH   3. Seasonal allergic rhinitis due to pollen     4. Hyponatremia  OSMOLALITY SERUM   5. Need for vaccination  TDAP VACCINE =>6YO IM           MEDICAL DECISION MAKING: TODAY'S DISCUSSION / STATUS / PLAN:    Medically the patient appeared clinically stable    Anemia.  Patient denies any history of bleeding.  Lab tests ordered for follow-up.    Allergic rhinitis.  Chronic stable condition.  Advised the patient to take over-the-counter Claritin as needed.    Hyponatremia.  Noted with previous lab test.  Blood tests ordered including serum osmolality.     Return in about 6 months (around 5/13/2021).       PATIENT EDUCATION:  -If any problems should arise, patient was advised to contact our office or go to ER to be evaluated.      Please note that this dictation was created using voice recognition software. I have made every reasonable attempt to correct obvious errors, but it is possible there are errors of grammar and possibly content that I did not discover before finalizing the note.

## 2020-11-13 NOTE — ASSESSMENT & PLAN NOTE
This is a chronic intermittent condition.  The patient takes over-the-counter antihistamine as needed.  Currently he is asymptomatic

## 2020-11-13 NOTE — ASSESSMENT & PLAN NOTE
Sodium level was slightly low at 133.  Repeat lab tests ordered.  Patient is currently asymptomatic.

## 2020-11-13 NOTE — ASSESSMENT & PLAN NOTE
Recent lab test shows slightly low hemoglobin 12.9.  Patient denies any history of bleeding.  Recommended a repeat blood test to be done including CBC iron study B12 folate and ferritin and FOBT.

## 2020-11-13 NOTE — TELEPHONE ENCOUNTER
From: KYLAH Christie  To: Richy Cain Ass't  Sent: 11/12/2020 10:51 PM PST  Subject: RE:Virtual Visits Available    Yes I need to come into the office and get my flu shot      ----- Message -----   From:Richy Cain Ass't   Sent:11/12/2020 2:30 PM PST   To:KYLAH Christie   Subject:RE:Virtual Visits Available    Your labs are fasting.   So you would still like to come in for an office visit? Please advise.    Thanks,  Susie HUSAIN      ----- Message -----   From:KYLAH Christie   Sent:11/12/2020 1:42 PM PST   To:Richy Cain Ass't   Subject:RE:Virtual Visits Available    I would still need to come in for my flu shot. For the labs are they fasting or non fasting?      ----- Message -----   From:Richy Cain Ass't   Sent:11/12/2020 12:07 PM PST   To:KYLAH Christie   Subject:Virtual Visits Available    Chucho MONTERO    Due to the increase of COVID -19 cases in our community your healthcare team would like to see you in a Virtual Visit from the comfort of your own home instead of in-person.    Your healthcare team can help you set up for a Virtual Visit and make sure you have everything you need.    Please call [phone] or send your provider a my chart message confirming your choice in this matter.    Look forward to hearing from you soon,  Susie HUSAIN

## 2020-11-18 ENCOUNTER — HOSPITAL ENCOUNTER (OUTPATIENT)
Dept: LAB | Facility: MEDICAL CENTER | Age: 30
End: 2020-11-18
Attending: INTERNAL MEDICINE
Payer: COMMERCIAL

## 2020-11-18 DIAGNOSIS — R79.89 ELEVATED FERRITIN LEVEL: ICD-10-CM

## 2020-11-18 DIAGNOSIS — Z13.21 SCREENING FOR ENDOCRINE, NUTRITIONAL, METABOLIC AND IMMUNITY DISORDER: ICD-10-CM

## 2020-11-18 DIAGNOSIS — Z76.89 ENCOUNTER TO ESTABLISH CARE WITH NEW DOCTOR: ICD-10-CM

## 2020-11-18 DIAGNOSIS — Z13.228 SCREENING FOR ENDOCRINE, NUTRITIONAL, METABOLIC AND IMMUNITY DISORDER: ICD-10-CM

## 2020-11-18 DIAGNOSIS — Z13.0 SCREENING FOR ENDOCRINE, NUTRITIONAL, METABOLIC AND IMMUNITY DISORDER: ICD-10-CM

## 2020-11-18 DIAGNOSIS — Z13.29 SCREENING FOR ENDOCRINE, NUTRITIONAL, METABOLIC AND IMMUNITY DISORDER: ICD-10-CM

## 2020-11-18 DIAGNOSIS — D64.9 ANEMIA, UNSPECIFIED TYPE: ICD-10-CM

## 2020-11-18 DIAGNOSIS — E87.1 HYPONATREMIA: ICD-10-CM

## 2020-11-18 LAB
ALBUMIN SERPL BCP-MCNC: 4.8 G/DL (ref 3.2–4.9)
ALBUMIN/GLOB SERPL: 1.6 G/DL
ALP SERPL-CCNC: 41 U/L (ref 30–99)
ALT SERPL-CCNC: 35 U/L (ref 2–50)
ANION GAP SERPL CALC-SCNC: 10 MMOL/L (ref 7–16)
AST SERPL-CCNC: 26 U/L (ref 12–45)
BASOPHILS # BLD AUTO: 1 % (ref 0–1.8)
BASOPHILS # BLD: 0.05 K/UL (ref 0–0.12)
BILIRUB SERPL-MCNC: 0.5 MG/DL (ref 0.1–1.5)
BUN SERPL-MCNC: 17 MG/DL (ref 8–22)
CALCIUM SERPL-MCNC: 9.7 MG/DL (ref 8.5–10.5)
CHLORIDE SERPL-SCNC: 104 MMOL/L (ref 96–112)
CHOLEST SERPL-MCNC: 212 MG/DL (ref 100–199)
CO2 SERPL-SCNC: 26 MMOL/L (ref 20–33)
CREAT SERPL-MCNC: 0.89 MG/DL (ref 0.5–1.4)
CREAT UR-MCNC: 170.81 MG/DL
EOSINOPHIL # BLD AUTO: 0.2 K/UL (ref 0–0.51)
EOSINOPHIL NFR BLD: 3.9 % (ref 0–6.9)
ERYTHROCYTE [DISTWIDTH] IN BLOOD BY AUTOMATED COUNT: 44.4 FL (ref 35.9–50)
EST. AVERAGE GLUCOSE BLD GHB EST-MCNC: 128 MG/DL
FASTING STATUS PATIENT QL REPORTED: NORMAL
FERRITIN SERPL-MCNC: 723 NG/ML (ref 22–322)
FOLATE SERPL-MCNC: 9.2 NG/ML
GLOBULIN SER CALC-MCNC: 3 G/DL (ref 1.9–3.5)
GLUCOSE SERPL-MCNC: 96 MG/DL (ref 65–99)
HBA1C MFR BLD: 6.1 % (ref 0–5.6)
HCT VFR BLD AUTO: 45.6 % (ref 42–52)
HDLC SERPL-MCNC: 49 MG/DL
HGB BLD-MCNC: 14.1 G/DL (ref 14–18)
IMM GRANULOCYTES # BLD AUTO: 0.01 K/UL (ref 0–0.11)
IMM GRANULOCYTES NFR BLD AUTO: 0.2 % (ref 0–0.9)
IRON SERPL-MCNC: 118 UG/DL (ref 50–180)
LDLC SERPL CALC-MCNC: 119 MG/DL
LYMPHOCYTES # BLD AUTO: 2.16 K/UL (ref 1–4.8)
LYMPHOCYTES NFR BLD: 41.7 % (ref 22–41)
MCH RBC QN AUTO: 27.1 PG (ref 27–33)
MCHC RBC AUTO-ENTMCNC: 30.9 G/DL (ref 33.7–35.3)
MCV RBC AUTO: 87.7 FL (ref 81.4–97.8)
MICROALBUMIN UR-MCNC: <1.2 MG/DL
MICROALBUMIN/CREAT UR: NORMAL MG/G (ref 0–30)
MONOCYTES # BLD AUTO: 0.44 K/UL (ref 0–0.85)
MONOCYTES NFR BLD AUTO: 8.5 % (ref 0–13.4)
NEUTROPHILS # BLD AUTO: 2.32 K/UL (ref 1.82–7.42)
NEUTROPHILS NFR BLD: 44.7 % (ref 44–72)
NRBC # BLD AUTO: 0 K/UL
NRBC BLD-RTO: 0 /100 WBC
OSMOLALITY SERPL: 301 MOSM/KG H2O (ref 278–298)
PLATELET # BLD AUTO: 274 K/UL (ref 164–446)
PMV BLD AUTO: 9.9 FL (ref 9–12.9)
POTASSIUM SERPL-SCNC: 4.4 MMOL/L (ref 3.6–5.5)
PROT SERPL-MCNC: 7.8 G/DL (ref 6–8.2)
RBC # BLD AUTO: 5.2 M/UL (ref 4.7–6.1)
SODIUM SERPL-SCNC: 140 MMOL/L (ref 135–145)
TRIGL SERPL-MCNC: 218 MG/DL (ref 0–149)
TSH SERPL DL<=0.005 MIU/L-ACNC: 1.84 UIU/ML (ref 0.38–5.33)
WBC # BLD AUTO: 5.2 K/UL (ref 4.8–10.8)

## 2020-11-18 PROCEDURE — 80053 COMPREHEN METABOLIC PANEL: CPT

## 2020-11-18 PROCEDURE — 82043 UR ALBUMIN QUANTITATIVE: CPT

## 2020-11-18 PROCEDURE — 85025 COMPLETE CBC W/AUTO DIFF WBC: CPT

## 2020-11-18 PROCEDURE — 36415 COLL VENOUS BLD VENIPUNCTURE: CPT

## 2020-11-18 PROCEDURE — 83036 HEMOGLOBIN GLYCOSYLATED A1C: CPT

## 2020-11-18 PROCEDURE — 82728 ASSAY OF FERRITIN: CPT

## 2020-11-18 PROCEDURE — 82746 ASSAY OF FOLIC ACID SERUM: CPT

## 2020-11-18 PROCEDURE — 80061 LIPID PANEL: CPT

## 2020-11-18 PROCEDURE — 82570 ASSAY OF URINE CREATININE: CPT

## 2020-11-18 PROCEDURE — 84443 ASSAY THYROID STIM HORMONE: CPT

## 2020-11-18 PROCEDURE — 83540 ASSAY OF IRON: CPT

## 2020-11-18 PROCEDURE — 83930 ASSAY OF BLOOD OSMOLALITY: CPT

## 2020-11-19 ENCOUNTER — TELEPHONE (OUTPATIENT)
Dept: MEDICAL GROUP | Facility: PHYSICIAN GROUP | Age: 30
End: 2020-11-19

## 2020-11-19 NOTE — TELEPHONE ENCOUNTER
----- Message from Johnathon Patel M.D. sent at 11/18/2020  5:27 PM PST -----    Please call patient : labs back    Ferritin level is elevated 723 [normal = ].  This was also noted before  This level is typically elevated in patients with some types of inflammatory condition or a reactive conditions. However, I did not notice such conditions with the pt during my last visit with him  I would like to refer pt to a Hematologist for a consultation.   In addition, I have ordered one more blood test for further evaluation . pls go to lab next 10d.    A1c  Mildly high = prediabetes  Please start/continue with low sweet low carb diet, continue with regular exercises / walking and maintain an ideal weight.    Cholesterol :      212 mildly high                [Desirable range = below 200]  LDL [bad cholesterol]:  119 mildly high     [Desirable range = below 100]  Triglycerides :   218 high                 [Desirable range  = below 150]  Please start/continue with low fat low cholesterol diet, continue to exercise regularly and maintain an ideal weight.

## 2020-12-18 ENCOUNTER — HOSPITAL ENCOUNTER (OUTPATIENT)
Dept: LAB | Facility: MEDICAL CENTER | Age: 30
End: 2020-12-18
Attending: INTERNAL MEDICINE
Payer: COMMERCIAL

## 2020-12-18 ENCOUNTER — HOSPITAL ENCOUNTER (OUTPATIENT)
Facility: MEDICAL CENTER | Age: 30
End: 2020-12-18
Attending: INTERNAL MEDICINE
Payer: COMMERCIAL

## 2020-12-18 DIAGNOSIS — R79.89 ELEVATED FERRITIN LEVEL: ICD-10-CM

## 2020-12-18 DIAGNOSIS — D64.9 ANEMIA, UNSPECIFIED TYPE: ICD-10-CM

## 2020-12-18 PROCEDURE — 81256 HFE GENE: CPT

## 2020-12-18 PROCEDURE — 36415 COLL VENOUS BLD VENIPUNCTURE: CPT

## 2020-12-18 PROCEDURE — 82274 ASSAY TEST FOR BLOOD FECAL: CPT

## 2020-12-22 LAB — HEMOCCULT STL QL IA: NEGATIVE

## 2020-12-24 LAB
HFE GENE MUT ANL BLD/T: NORMAL
HFE P.C282Y BLD/T QL: NEGATIVE
HFE P.H63D BLD/T QL: NEGATIVE
HFE P.S65C BLD/T QL: NEGATIVE

## 2021-01-12 ASSESSMENT — ENCOUNTER SYMPTOMS
DIAPHORESIS: 0
NAUSEA: 0
WHEEZING: 0
DEPRESSION: 0
HEADACHES: 0
NERVOUS/ANXIOUS: 0
BLURRED VISION: 0
COUGH: 0
PHOTOPHOBIA: 0
CHILLS: 0
MYALGIAS: 0
FEVER: 0
BRUISES/BLEEDS EASILY: 0
CONSTIPATION: 0
DOUBLE VISION: 0
DIARRHEA: 0
SPUTUM PRODUCTION: 0
WEIGHT LOSS: 0
TINGLING: 0
DIZZINESS: 0
INSOMNIA: 0
SHORTNESS OF BREATH: 0
SENSORY CHANGE: 0
VOMITING: 0

## 2021-01-12 NOTE — PROGRESS NOTES
Consult:  Hematology/Oncology      Referring Physician: Johnathon Patel M.D.  Primary Care:  Johnathon Patel M.D.    Diagnosis: iron overload    Chief Complaint: Fatigue    History of Presenting Illness:  KYLAH Christie is a 30 y.o. male with a recent history of bilateral pneumonia with hypoxia requiring hospitalization after a trip to [facility].  His COVID-19 test was negative.  The patient had travels with his parents so his wife and his child and was the only one that developed pneumonia     He establish care with his primary care physician in November 2019, was noted to have a hemoglobin of 12.9 and MCV of 84 white count of 5000 hemoglobin 14.1 neutrophil count 67%.   A repeat the CBC a month ago show a white count of 5.2 hemoglobin 14.1 MCV 87.7 platelet count 274,000 and the neutrophil count of 2.3.   A ferritin done 3 months ago was 718 and a repeated one was 723 a month ago.  Hemoglobin A1c was 6.1.  Metabolic panel done 3 months ago showed a sodium of 133 and a potassium of 3.5 and a serum creatinine of 1.1 otherwise unremarkable.  Repeated CMP done a month ago was entirely within normal limit. and irritability hemachromatosis panel was ordered on 12/24/2020 was negative.    The patient is primarily complaining of fatigue not able to do the same degree of exercise he was doing prior to his pneumonia.  He denies decreased sexual drive.  Denies joint pain joint stiffness and swelling.  Report sporadic palpitation since childhood.  Interval History:  Patient is here for consultation.     Past Medical History:   Diagnosis Date   • Allergy to pollen 2014   • Anemia 11/13/2020    Hg 12 Ordered ffib, Hg electro   • Bleeding nose 1999   • Heart palpitations 1998   • Wrist fracture, bilateral        No past surgical history on file.    Social History     Socioeconomic History   • Marital status:      Spouse name: Not on file   • Number of children: Not on file   • Years of education: Not on file   • Highest  education level: Not on file   Occupational History   • Not on file   Social Needs   • Financial resource strain: Not on file   • Food insecurity     Worry: Not on file     Inability: Not on file   • Transportation needs     Medical: Not on file     Non-medical: Not on file   Tobacco Use   • Smoking status: Never Smoker   • Smokeless tobacco: Never Used   Substance and Sexual Activity   • Alcohol use: Yes     Comment: 1 - 2 beers daily    • Drug use: No   • Sexual activity: Yes     Partners: Female   Lifestyle   • Physical activity     Days per week: Not on file     Minutes per session: Not on file   • Stress: Not on file   Relationships   • Social connections     Talks on phone: Not on file     Gets together: Not on file     Attends Sabianism service: Not on file     Active member of club or organization: Not on file     Attends meetings of clubs or organizations: Not on file     Relationship status: Not on file   • Intimate partner violence     Fear of current or ex partner: Not on file     Emotionally abused: Not on file     Physically abused: Not on file     Forced sexual activity: Not on file   Other Topics Concern   • Not on file   Social History Narrative   • Not on file       Family History   Problem Relation Age of Onset   • Arrythmia Mother         had ablation procedure 2017       OB History   No obstetric history on file.       Allergies as of 01/13/2021   • (No Known Allergies)       No current outpatient medications on file.    Review of Systems:  Review of Systems   Constitutional: Positive for malaise/fatigue. Negative for chills, diaphoresis, fever and weight loss.   HENT: Negative for nosebleeds and tinnitus.    Eyes: Negative for blurred vision, double vision and photophobia.   Respiratory: Negative for cough, sputum production, shortness of breath and wheezing.    Cardiovascular: Positive for palpitations. Negative for chest pain and leg swelling.   Gastrointestinal: Negative for constipation,  diarrhea, nausea and vomiting.   Genitourinary: Negative for dysuria.   Musculoskeletal: Negative for joint pain and myalgias.   Skin: Negative for rash.   Neurological: Negative for dizziness, tingling, sensory change and headaches.   Endo/Heme/Allergies: Does not bruise/bleed easily.        Bilateral epistaxis  Sporadic   Psychiatric/Behavioral: Negative for depression. The patient is not nervous/anxious and does not have insomnia.           Physical Exam:  There were no vitals filed for this visit.    Physical Exam  Vitals signs reviewed.   Constitutional:       General: He is not in acute distress.  HENT:      Head: Normocephalic.      Mouth/Throat:      Mouth: Mucous membranes are moist.   Eyes:      Extraocular Movements: Extraocular movements intact.      Pupils: Pupils are equal, round, and reactive to light.   Neck:      Musculoskeletal: Normal range of motion and neck supple.   Cardiovascular:      Rate and Rhythm: Normal rate and regular rhythm.      Pulses: Normal pulses.      Heart sounds: Normal heart sounds.   Pulmonary:      Effort: Pulmonary effort is normal.      Breath sounds: Normal breath sounds.   Abdominal:      General: Bowel sounds are normal.      Palpations: Abdomen is soft.      Tenderness: There is no abdominal tenderness.   Musculoskeletal: Normal range of motion.   Lymphadenopathy:      Head:      Right side of head: No submental, submandibular, tonsillar, preauricular, posterior auricular or occipital adenopathy.      Left side of head: No submental, submandibular, tonsillar, preauricular, posterior auricular or occipital adenopathy.      Cervical: No cervical adenopathy.      Right cervical: No superficial, deep or posterior cervical adenopathy.     Left cervical: No superficial, deep or posterior cervical adenopathy.      Upper Body:      Right upper body: No supraclavicular, axillary, pectoral or epitrochlear adenopathy.      Left upper body: No supraclavicular, axillary, pectoral  or epitrochlear adenopathy.      Lower Body: No right inguinal adenopathy. No left inguinal adenopathy.   Skin:     General: Skin is warm and dry.   Neurological:      General: No focal deficit present.      Mental Status: He is alert and oriented to person, place, and time.   Psychiatric:         Mood and Affect: Mood normal.           Labs:  No visits with results within 1 Day(s) from this visit.   Latest known visit with results is:   Hospital Outpatient Visit on 12/18/2020   Component Date Value Ref Range Status   • Occult Blood, IA 12/18/2020 Negative  Negative Final       Imaging:   No results found.       Assessment & Plan:  30-year-old  male with transient mild anemia and iron overload. With evidence of prediabetes.   History of recurrent epistaxis  Fatigue with exercising  Differential diagnosis for iron overload in this case involve the ineffective erythropoiesis secondary to sideroblastic anemia spherocytosis hemoglobinopathy  Hereditary hemochromatosis secondary to rare mutations: HJV, HAMP, TR2 receptor and ferroportin gene.  Inflammatory process  HIV  Less likely chronic liver disease since the metabolic panel was entirely within normal limits    The plan is obtain a CRP, iron saturation, repeat the CBC differential, complete metabolic panel, HIV, testosterone level to determine endorgan damage, questioning the patient further about alcohol abuse and obtain an MRI of the liver to assess the degree of the iron overload.  We will obtain a PT PTT von Willebrand's antigen and activity considering multimer  And consider ristocetin cofactor activity    After this preliminary work-up if there is confirmation of iron overload and exclusion of any primary cause like ineffective erythropoiesis, chronic hemolysis (unlikely because of the normal bilirubin anyway), chronic inflammation, we will proceed to order genetic testing for the less common form of reviewed hemochromatosis that I have listed in the first  paragraph of my assessment.    I spent over 50 minutes face-to-face with the patient his wife and with the help of a professional Chinese  to take the story complete my assessment and explaining the reason and rationale for my plan to the patient and his wife.    I extensively reviewed the extent of record consistent in hospital record and note of his primary care physician and tests report.      Follow-up will be in 4 weeks      The patient and his wife agreed and verbalized his agreement and understanding with the current plan. I answered all questions and concerns he has at this time and advised him to call at any time in the interim with questions or concerns in regards to his care.     Thank you for allowing me to participate in his care, I will continue to follow.

## 2021-01-13 ENCOUNTER — OFFICE VISIT (OUTPATIENT)
Dept: HEMATOLOGY ONCOLOGY | Facility: MEDICAL CENTER | Age: 31
End: 2021-01-13
Payer: COMMERCIAL

## 2021-01-13 VITALS
TEMPERATURE: 98.4 F | HEART RATE: 76 BPM | RESPIRATION RATE: 14 BRPM | HEIGHT: 72 IN | DIASTOLIC BLOOD PRESSURE: 70 MMHG | WEIGHT: 188.93 LBS | OXYGEN SATURATION: 97 % | BODY MASS INDEX: 25.59 KG/M2 | SYSTOLIC BLOOD PRESSURE: 110 MMHG

## 2021-01-13 DIAGNOSIS — E83.19 IRON OVERLOAD: ICD-10-CM

## 2021-01-13 DIAGNOSIS — R04.0 EPISTAXIS, RECURRENT: ICD-10-CM

## 2021-01-13 DIAGNOSIS — E83.118 OTHER HEMOCHROMATOSIS: ICD-10-CM

## 2021-01-13 PROCEDURE — 99204 OFFICE O/P NEW MOD 45 MIN: CPT | Performed by: INTERNAL MEDICINE

## 2021-01-13 ASSESSMENT — PATIENT HEALTH QUESTIONNAIRE - PHQ9: CLINICAL INTERPRETATION OF PHQ2 SCORE: 0

## 2021-01-13 ASSESSMENT — PAIN SCALES - GENERAL: PAINLEVEL: NO PAIN

## 2021-01-13 ASSESSMENT — ENCOUNTER SYMPTOMS: PALPITATIONS: 1

## 2021-01-13 ASSESSMENT — FIBROSIS 4 INDEX: FIB4 SCORE: 0.47

## 2021-01-14 ENCOUNTER — OFFICE VISIT (OUTPATIENT)
Dept: MEDICAL GROUP | Facility: IMAGING CENTER | Age: 31
End: 2021-01-14

## 2021-01-28 ENCOUNTER — APPOINTMENT (OUTPATIENT)
Dept: MEDICAL GROUP | Facility: IMAGING CENTER | Age: 31
End: 2021-01-28

## 2021-02-03 ENCOUNTER — TELEPHONE (OUTPATIENT)
Dept: HEMATOLOGY ONCOLOGY | Facility: MEDICAL CENTER | Age: 31
End: 2021-02-03

## 2021-02-03 NOTE — TELEPHONE ENCOUNTER
Called pt and LVM to call back.     When pt calls back, please inform him he needs labs drawn as soon as possible in order to keep his appt for next week on 2/10. Ideally pt will have labs drawn 1 week prior to that appt.     Also offer VV to pt.

## 2021-02-05 ENCOUNTER — HOSPITAL ENCOUNTER (OUTPATIENT)
Dept: LAB | Facility: MEDICAL CENTER | Age: 31
End: 2021-02-05
Attending: INTERNAL MEDICINE
Payer: COMMERCIAL

## 2021-02-05 DIAGNOSIS — R04.0 EPISTAXIS, RECURRENT: ICD-10-CM

## 2021-02-05 DIAGNOSIS — E83.19 IRON OVERLOAD: ICD-10-CM

## 2021-02-05 LAB
ALBUMIN SERPL BCP-MCNC: 5 G/DL (ref 3.2–4.9)
ALBUMIN/GLOB SERPL: 1.6 G/DL
ALP SERPL-CCNC: 39 U/L (ref 30–99)
ALT SERPL-CCNC: 31 U/L (ref 2–50)
ANION GAP SERPL CALC-SCNC: 13 MMOL/L (ref 7–16)
APTT PPP: 37.4 SEC (ref 24.7–36)
AST SERPL-CCNC: 26 U/L (ref 12–45)
BASOPHILS # BLD AUTO: 0.7 % (ref 0–1.8)
BASOPHILS # BLD: 0.03 K/UL (ref 0–0.12)
BILIRUB SERPL-MCNC: 0.6 MG/DL (ref 0.1–1.5)
BUN SERPL-MCNC: 17 MG/DL (ref 8–22)
CALCIUM SERPL-MCNC: 9.6 MG/DL (ref 8.5–10.5)
CHLORIDE SERPL-SCNC: 104 MMOL/L (ref 96–112)
CO2 SERPL-SCNC: 22 MMOL/L (ref 20–33)
CREAT SERPL-MCNC: 0.93 MG/DL (ref 0.5–1.4)
EOSINOPHIL # BLD AUTO: 0.12 K/UL (ref 0–0.51)
EOSINOPHIL NFR BLD: 2.9 % (ref 0–6.9)
ERYTHROCYTE [DISTWIDTH] IN BLOOD BY AUTOMATED COUNT: 42.7 FL (ref 35.9–50)
FERRITIN SERPL-MCNC: 653 NG/ML (ref 22–322)
GLOBULIN SER CALC-MCNC: 3.1 G/DL (ref 1.9–3.5)
GLUCOSE SERPL-MCNC: 95 MG/DL (ref 65–99)
HCT VFR BLD AUTO: 47.4 % (ref 42–52)
HGB BLD-MCNC: 14.9 G/DL (ref 14–18)
HIV 1+2 AB+HIV1 P24 AG SERPL QL IA: NORMAL
IMM GRANULOCYTES # BLD AUTO: 0 K/UL (ref 0–0.11)
IMM GRANULOCYTES NFR BLD AUTO: 0 % (ref 0–0.9)
INR PPP: 0.87 (ref 0.87–1.13)
IRON SATN MFR SERPL: 36 % (ref 15–55)
IRON SERPL-MCNC: 125 UG/DL (ref 50–180)
LYMPHOCYTES # BLD AUTO: 1.81 K/UL (ref 1–4.8)
LYMPHOCYTES NFR BLD: 44.5 % (ref 22–41)
MCH RBC QN AUTO: 28.2 PG (ref 27–33)
MCHC RBC AUTO-ENTMCNC: 31.4 G/DL (ref 33.7–35.3)
MCV RBC AUTO: 89.6 FL (ref 81.4–97.8)
MONOCYTES # BLD AUTO: 0.26 K/UL (ref 0–0.85)
MONOCYTES NFR BLD AUTO: 6.4 % (ref 0–13.4)
NEUTROPHILS # BLD AUTO: 1.85 K/UL (ref 1.82–7.42)
NEUTROPHILS NFR BLD: 45.5 % (ref 44–72)
NRBC # BLD AUTO: 0 K/UL
NRBC BLD-RTO: 0 /100 WBC
PLATELET # BLD AUTO: 276 K/UL (ref 164–446)
PMV BLD AUTO: 10 FL (ref 9–12.9)
POTASSIUM SERPL-SCNC: 4.1 MMOL/L (ref 3.6–5.5)
PROT SERPL-MCNC: 8.1 G/DL (ref 6–8.2)
PROTHROMBIN TIME: 12.1 SEC (ref 12–14.6)
RBC # BLD AUTO: 5.29 M/UL (ref 4.7–6.1)
SODIUM SERPL-SCNC: 139 MMOL/L (ref 135–145)
TESTOST SERPL-MCNC: 354 NG/DL (ref 175–781)
TIBC SERPL-MCNC: 351 UG/DL (ref 250–450)
UIBC SERPL-MCNC: 226 UG/DL (ref 110–370)
WBC # BLD AUTO: 4.1 K/UL (ref 4.8–10.8)

## 2021-02-05 PROCEDURE — 85025 COMPLETE CBC W/AUTO DIFF WBC: CPT

## 2021-02-05 PROCEDURE — 83010 ASSAY OF HAPTOGLOBIN QUANT: CPT

## 2021-02-05 PROCEDURE — 36415 COLL VENOUS BLD VENIPUNCTURE: CPT

## 2021-02-05 PROCEDURE — 80053 COMPREHEN METABOLIC PANEL: CPT

## 2021-02-05 PROCEDURE — 85246 CLOT FACTOR VIII VW ANTIGEN: CPT

## 2021-02-05 PROCEDURE — 84403 ASSAY OF TOTAL TESTOSTERONE: CPT

## 2021-02-05 PROCEDURE — 83021 HEMOGLOBIN CHROMOTOGRAPHY: CPT

## 2021-02-05 PROCEDURE — 83540 ASSAY OF IRON: CPT

## 2021-02-05 PROCEDURE — 82728 ASSAY OF FERRITIN: CPT

## 2021-02-05 PROCEDURE — 85730 THROMBOPLASTIN TIME PARTIAL: CPT

## 2021-02-05 PROCEDURE — 87389 HIV-1 AG W/HIV-1&-2 AB AG IA: CPT

## 2021-02-05 PROCEDURE — 83550 IRON BINDING TEST: CPT

## 2021-02-05 PROCEDURE — 85245 CLOT FACTOR VIII VW RISTOCTN: CPT

## 2021-02-05 PROCEDURE — 85610 PROTHROMBIN TIME: CPT

## 2021-02-05 NOTE — TELEPHONE ENCOUNTER
Called to offer the patient a vv and spoke to his wife, she declined due to needing an  for her . The wife also mentioned that her  needed an MRI. After looking at the encounters I confirmed this and transferred his wife to scheduling in order to get that scheduled.     The wife verbalized agreement and understanding.

## 2021-02-06 LAB — HAPTOGLOB SERPL-MCNC: 140 MG/DL (ref 30–200)

## 2021-02-07 LAB
HGB A1 MFR BLD: 96.3 % (ref 95–97.9)
HGB A2 MFR BLD: 2.9 % (ref 2–3.5)
HGB C MFR BLD: 0 % (ref 0–0)
HGB E MFR BLD: 0 % (ref 0–0)
HGB F MFR BLD: 0.8 % (ref 0–2.1)
HGB FRACT BLD ELPH-IMP: NORMAL
HGB OTHER MFR BLD: 0 % (ref 0–0)
HGB S BLD QL SOLY: NORMAL
HGB S MFR BLD: 0 % (ref 0–0)
PATH INTERP BLD-IMP: NORMAL

## 2021-02-08 LAB
VWF AG ACT/NOR PPP IA: 46 % (ref 52–214)
VWF:RCO ACT/NOR PPP PL AGG: 57 % (ref 51–215)

## 2021-02-09 ASSESSMENT — ENCOUNTER SYMPTOMS
DEPRESSION: 0
COUGH: 0
CHILLS: 0
SPUTUM PRODUCTION: 0
FEVER: 0
HEADACHES: 0
PHOTOPHOBIA: 0
BRUISES/BLEEDS EASILY: 0
SENSORY CHANGE: 0
SHORTNESS OF BREATH: 0
DOUBLE VISION: 0
WEIGHT LOSS: 0
NERVOUS/ANXIOUS: 0
MYALGIAS: 0
BLURRED VISION: 0
TINGLING: 0
INSOMNIA: 0
DIAPHORESIS: 0
DIZZINESS: 0
WHEEZING: 0
CONSTIPATION: 0
VOMITING: 0
NAUSEA: 0

## 2021-02-10 ENCOUNTER — OFFICE VISIT (OUTPATIENT)
Dept: HEMATOLOGY ONCOLOGY | Facility: MEDICAL CENTER | Age: 31
End: 2021-02-10
Payer: COMMERCIAL

## 2021-02-10 VITALS
TEMPERATURE: 97.8 F | HEART RATE: 82 BPM | BODY MASS INDEX: 25.33 KG/M2 | DIASTOLIC BLOOD PRESSURE: 66 MMHG | RESPIRATION RATE: 14 BRPM | WEIGHT: 186.73 LBS | OXYGEN SATURATION: 97 % | SYSTOLIC BLOOD PRESSURE: 110 MMHG

## 2021-02-10 DIAGNOSIS — L65.9 HAIR LOSS: ICD-10-CM

## 2021-02-10 DIAGNOSIS — R04.0 EPISTAXIS, RECURRENT: ICD-10-CM

## 2021-02-10 DIAGNOSIS — R19.7 DIARRHEA, UNSPECIFIED TYPE: ICD-10-CM

## 2021-02-10 DIAGNOSIS — E83.118 OTHER HEMOCHROMATOSIS: ICD-10-CM

## 2021-02-10 DIAGNOSIS — R79.1 ELEVATED PARTIAL THROMBOPLASTIN TIME (PTT): ICD-10-CM

## 2021-02-10 PROCEDURE — 99214 OFFICE O/P EST MOD 30 MIN: CPT | Performed by: INTERNAL MEDICINE

## 2021-02-10 ASSESSMENT — ENCOUNTER SYMPTOMS
ROS SKIN COMMENTS: HAIR LOSS
DIARRHEA: 1

## 2021-02-10 ASSESSMENT — PAIN SCALES - GENERAL: PAINLEVEL: NO PAIN

## 2021-02-10 ASSESSMENT — FIBROSIS 4 INDEX: FIB4 SCORE: 0.49

## 2021-02-10 NOTE — PROGRESS NOTES
Follow Up Note:  Hematology/Oncology      Primary Care:  Johnathon Patel M.D.    Diagnosis: Iron overload    Chief Complaint: Fatigue    History of Presenting Illness:  KYLAH Christie is a 30 y.o. male  with a recent history of bilateral pneumonia with hypoxia requiring hospitalization after a trip to [facility].  His COVID-19 test was negative.  The patient had travels with his parents so his wife and his child and was the only one that developed pneumonia      He establish care with his primary care physician in November 2019, was noted to have a hemoglobin of 12.9 and MCV of 84 white count of 5000 hemoglobin 14.1 neutrophil count 67%.   A repeat the CBC a month ago show a white count of 5.2 hemoglobin 14.1 MCV 87.7 platelet count 274,000 and the neutrophil count of 2.3.   A ferritin done 3 months ago was 718 and a repeated one was 723 a month ago.  Hemoglobin A1c was 6.1.  Metabolic panel done 3 months ago showed a sodium of 133 and a potassium of 3.5 and a serum creatinine of 1.1 otherwise unremarkable.  Repeated CMP done a month ago was entirely within normal limit. The hemachromatosis panel was ordered on 12/24/2020 was negative.     The patient is primarily complaining of fatigue not able to do the same degree of exercise he was doing prior to his pneumonia.  He denies decreased sexual drive.  Denies joint pain joint stiffness and swelling.  Report sporadic palpitation since childhood.  Interval History:  Patient is here for follow up visit.      Past Medical History:   Diagnosis Date   • Allergy to pollen 2014   • Anemia 11/13/2020    Hg 12 Ordered ffib, Hg electro   • Bleeding nose 1999   • Heart palpitations 1998   • Wrist fracture, bilateral        Allergies as of 02/10/2021   • (No Known Allergies)       No current outpatient medications on file.      Review of Systems:  Review of Systems   Constitutional: Positive for malaise/fatigue. Negative for chills, diaphoresis, fever and weight loss.   HENT:  Positive for nosebleeds. Negative for tinnitus.    Eyes: Negative for blurred vision, double vision and photophobia.   Respiratory: Negative for cough, sputum production, shortness of breath and wheezing.    Cardiovascular: Negative for chest pain and leg swelling.   Gastrointestinal: Positive for diarrhea. Negative for constipation, nausea and vomiting.   Genitourinary: Negative for dysuria.   Musculoskeletal: Negative for joint pain and myalgias.   Skin: Negative for rash.        hair loss   Neurological: Negative for dizziness, tingling, sensory change and headaches.   Endo/Heme/Allergies: Does not bruise/bleed easily.   Psychiatric/Behavioral: Negative for depression. The patient is not nervous/anxious and does not have insomnia.          Physical Exam:  Vitals:    02/10/21 0910   BP: 110/66   Pulse: 82   Resp: 14   Temp: 36.6 °C (97.8 °F)   SpO2: 97%   Weight: 84.7 kg (186 lb 11.7 oz)       Physical Exam  Vitals signs reviewed.   Constitutional:       General: He is not in acute distress.  HENT:      Head: Normocephalic.      Mouth/Throat:      Mouth: Mucous membranes are moist.   Eyes:      Extraocular Movements: Extraocular movements intact.      Pupils: Pupils are equal, round, and reactive to light.   Neck:      Musculoskeletal: Normal range of motion and neck supple.   Cardiovascular:      Rate and Rhythm: Normal rate and regular rhythm.      Pulses: Normal pulses.      Heart sounds: Normal heart sounds.   Pulmonary:      Effort: Pulmonary effort is normal.      Breath sounds: Normal breath sounds.   Abdominal:      General: Bowel sounds are normal.      Palpations: Abdomen is soft.      Tenderness: There is no abdominal tenderness.   Musculoskeletal: Normal range of motion.   Lymphadenopathy:      Head:      Right side of head: No submental, submandibular, tonsillar, preauricular, posterior auricular or occipital adenopathy.      Left side of head: No submental, submandibular, tonsillar, preauricular,  posterior auricular or occipital adenopathy.      Cervical: No cervical adenopathy.      Right cervical: No superficial, deep or posterior cervical adenopathy.     Left cervical: No superficial, deep or posterior cervical adenopathy.      Upper Body:      Right upper body: No supraclavicular, axillary, pectoral or epitrochlear adenopathy.      Left upper body: No supraclavicular, axillary, pectoral or epitrochlear adenopathy.      Lower Body: No right inguinal adenopathy. No left inguinal adenopathy.   Skin:     General: Skin is warm and dry.   Neurological:      General: No focal deficit present.      Mental Status: He is alert and oriented to person, place, and time.   Psychiatric:         Mood and Affect: Mood normal.           Labs:  No visits with results within 1 Day(s) from this visit.   Latest known visit with results is:   Hospital Outpatient Visit on 02/05/2021   Component Date Value Ref Range Status   • VWF Activity 02/05/2021 57  51 - 215 % Final    Comment: REFERENCE INTERVAL: von Willebrand Factor, Activity (RCF)  Access complete set of age- and/or gender-specific reference  intervals for this test in the [facility] Test Directory  ([email or web address]).  Performed by [facility],  [address] [place] [place] [address] [phone]  [email or web address], Elvia Carranza MD - Lab. Director     • vWF Antigen 02/05/2021 46* 52 - 214 % Final    Comment: REFERENCE INTERVAL: von Willebrand Factor, Antigen  Access complete set of age- and/or gender-specific reference  intervals for this test in the [facility] Test Directory  ([email or web address]).  Performed by [facility],  [address] [facility] [address] [phone]  [email or web address], Elvia Carranza MD - Lab. Director     • HIV Ag/Ab Combo Assay 02/05/2021 Non-Reactive  Non Reactive Final    Comment: Roche HIV is a diagnostic test for the qualitative determination of HIV-1  p24 antigen and antibodies to HIV-1 (HIV-1 groups M and O) and HIV-2 in  human serum and plasma.  A negative screen does not preclude the possibility  of exposure or infection. Consider retesting in high-risk patients, if  clinically indicated.     • Testosterone,Total 02/05/2021 354  175 - 781 ng/dL Final   • Ferritin 02/05/2021 653.0* 22.0 - 322.0 ng/mL Final   • Iron 02/05/2021 125  50 - 180 ug/dL Final   • Total Iron Binding 02/05/2021 351  250 - 450 ug/dL Final   • Unsat Iron Binding 02/05/2021 226  110 - 370 ug/dL Final   • % Saturation 02/05/2021 36  15 - 55 % Final   • Hemoglobin A1 02/05/2021 96.3  95.0 - 97.9 % Final   • Hemoglobin A2 02/05/2021 2.9  2.0 - 3.5 % Final   • Hemoglobin F 02/05/2021 0.8  0.0 - 2.1 % Final    Comment: REFERENCE INTERVAL: Hemoglobin F  Access complete set of age- and/or gender-specific reference  intervals for this test in the [facility] Test Directory  ([email or web address]).     • Hemoglobin S 02/05/2021 0.0  0.0 - 0.0 % Final   • Hemaglobin C 02/05/2021 0.0  0.0 - 0.0 % Final   • Hemoglobin E 02/05/2021 0.0  0.0 - 0.0 % Final   • Hemoglobin Other 02/05/2021 0.0  0.0 - 0.0 % Final   • Hemoglobin Eval 02/05/2021 See Note   Final    Comment: Impression: Normal HPLC evaluation.  This normal HPLC result does not rule out the possibility of alpha  globin gene deletions associated with silent carrier status or  alpha thalassemia trait. Individuals who carry a rare, Greek beta  thalassemia variant often have a normal Hb A2 and may not be  identified by this assay. Please correlate with clinical and  laboratory findings.     • Hemoglobin,Capillary Electrophores* 02/05/2021 Not Performed   Final    Comment: Performed By: [facility]  [address] [place], [place] [id number]  : Elvia Carranza MD     • Hgb Solubility 02/05/2021 Not Performed   Final   • Haptoglobin 02/05/2021 140  30 - 200 mg/dL Final    Comment: Performed By: [facility]  [address] [place], [place] [id number]  : Elvia Carranza MD     • Sodium 02/05/2021 139  611  - 145 mmol/L Final   • Potassium 02/05/2021 4.1  3.6 - 5.5 mmol/L Final   • Chloride 02/05/2021 104  96 - 112 mmol/L Final   • Co2 02/05/2021 22  20 - 33 mmol/L Final   • Anion Gap 02/05/2021 13.0  7.0 - 16.0 Final   • Glucose 02/05/2021 95  65 - 99 mg/dL Final   • Bun 02/05/2021 17  8 - 22 mg/dL Final   • Creatinine 02/05/2021 0.93  0.50 - 1.40 mg/dL Final   • Calcium 02/05/2021 9.6  8.5 - 10.5 mg/dL Final   • AST(SGOT) 02/05/2021 26  12 - 45 U/L Final   • ALT(SGPT) 02/05/2021 31  2 - 50 U/L Final   • Alkaline Phosphatase 02/05/2021 39  30 - 99 U/L Final   • Total Bilirubin 02/05/2021 0.6  0.1 - 1.5 mg/dL Final   • Albumin 02/05/2021 5.0* 3.2 - 4.9 g/dL Final   • Total Protein 02/05/2021 8.1  6.0 - 8.2 g/dL Final   • Globulin 02/05/2021 3.1  1.9 - 3.5 g/dL Final   • A-G Ratio 02/05/2021 1.6  g/dL Final   • WBC 02/05/2021 4.1* 4.8 - 10.8 K/uL Final   • RBC 02/05/2021 5.29  4.70 - 6.10 M/uL Final   • Hemoglobin 02/05/2021 14.9  14.0 - 18.0 g/dL Final   • Hematocrit 02/05/2021 47.4  42.0 - 52.0 % Final   • MCV 02/05/2021 89.6  81.4 - 97.8 fL Final   • MCH 02/05/2021 28.2  27.0 - 33.0 pg Final   • MCHC 02/05/2021 31.4* 33.7 - 35.3 g/dL Final   • RDW 02/05/2021 42.7  35.9 - 50.0 fL Final   • Platelet Count 02/05/2021 276  164 - 446 K/uL Final   • MPV 02/05/2021 10.0  9.0 - 12.9 fL Final   • Neutrophils-Polys 02/05/2021 45.50  44.00 - 72.00 % Final   • Lymphocytes 02/05/2021 44.50* 22.00 - 41.00 % Final   • Monocytes 02/05/2021 6.40  0.00 - 13.40 % Final   • Eosinophils 02/05/2021 2.90  0.00 - 6.90 % Final   • Basophils 02/05/2021 0.70  0.00 - 1.80 % Final   • Immature Granulocytes 02/05/2021 0.00  0.00 - 0.90 % Final   • Nucleated RBC 02/05/2021 0.00  /100 WBC Final   • Neutrophils (Absolute) 02/05/2021 1.85  1.82 - 7.42 K/uL Final    Includes immature neutrophils, if present.   • Lymphs (Absolute) 02/05/2021 1.81  1.00 - 4.80 K/uL Final   • Monos (Absolute) 02/05/2021 0.26  0.00 - 0.85 K/uL Final   • Eos (Absolute)  02/05/2021 0.12  0.00 - 0.51 K/uL Final   • Baso (Absolute) 02/05/2021 0.03  0.00 - 0.12 K/uL Final   • Immature Granulocytes (abs) 02/05/2021 0.00  0.00 - 0.11 K/uL Final   • NRBC (Absolute) 02/05/2021 0.00  K/uL Final   • PT 02/05/2021 12.1  12.0 - 14.6 sec Final   • INR 02/05/2021 0.87  0.87 - 1.13 Final    Comment: INR - Non-therapeutic Reference Range: 0.87-1.13  INR - Therapeutic Reference Range: 2.0-4.0     • APTT 02/05/2021 37.4* 24.7 - 36.0 sec Final   • GFR If  02/05/2021 >60  >60 mL/min/1.73 m 2 Final   • GFR If Non  02/05/2021 >60  >60 mL/min/1.73 m 2 Final       Imaging:   No results found.      Assessment & Plan:  30-year-old  male with elevated ferritin saturation 36%  No sign of hemolysis based on a normal serum haptoglobin  Slightly decreased von Willebrand antigen which could be suggestive of a mild form of type I von Willebrand disease.  Elevated PTT  Review of systems significant for hair loss, fatigue and intermittent diarrhea.  The MRI of the abdomen for assessment of iron overload is pending  We will follow-up after the MRI  At this point we will not have any change in management  Will order mixing study for the PTT  And coagulation factor testing to explain the elevation of the PTT.  Will order antinuclear antibody in view of the remote possibility of an autoimmune disorder and C-reactive protein  Thyroid function test  Stool for parasite  We have communicated our plan complete the assessment and revised the review of system with the help of a Chinese         he agreed and verbalized his agreement and understanding with the current plan. I answered all questions and concerns he has at this time and advised him to call at any time in the interim with questions or concerns in regards to his care.       Please note that this dictation was created using voice recognition software. I have made every reasonable attempt to correct obvious errors, but I  expect that there are errors of grammar and possibly content that I did not discover before finalizing the note.      Thank you for allowing me to participate in his care, I will continue to follow.

## 2021-03-03 ENCOUNTER — HOSPITAL ENCOUNTER (OUTPATIENT)
Dept: RADIOLOGY | Facility: MEDICAL CENTER | Age: 31
End: 2021-03-03
Attending: INTERNAL MEDICINE
Payer: COMMERCIAL

## 2021-03-03 ENCOUNTER — TELEPHONE (OUTPATIENT)
Dept: HEMATOLOGY ONCOLOGY | Facility: MEDICAL CENTER | Age: 31
End: 2021-03-03

## 2021-03-03 DIAGNOSIS — E83.19 IRON OVERLOAD: ICD-10-CM

## 2021-03-03 DIAGNOSIS — E83.118 OTHER HEMOCHROMATOSIS: ICD-10-CM

## 2021-03-03 PROCEDURE — A9576 INJ PROHANCE MULTIPACK: HCPCS | Performed by: INTERNAL MEDICINE

## 2021-03-03 PROCEDURE — 700117 HCHG RX CONTRAST REV CODE 255: Performed by: INTERNAL MEDICINE

## 2021-03-03 PROCEDURE — 74183 MRI ABD W/O CNTR FLWD CNTR: CPT

## 2021-03-03 RX ADMIN — GADOTERIDOL 17 ML: 279.3 INJECTION, SOLUTION INTRAVENOUS at 08:53

## 2021-03-03 NOTE — TELEPHONE ENCOUNTER
Roque from radiology called and stated that they just wanted to give us a heads up that the MRI's that we have are unable to give a quantitative result, that basically can only tell if their is a little or a lot. Dr. Buchanan was informed and wanted them to proceed with MRI scan.

## 2021-03-05 ENCOUNTER — TELEPHONE (OUTPATIENT)
Dept: HEMATOLOGY ONCOLOGY | Facility: MEDICAL CENTER | Age: 31
End: 2021-03-05

## 2021-03-05 NOTE — TELEPHONE ENCOUNTER
LVM for patient to return call. Please inform patient/wife that Demetrius has labs that need to be completed prior to his appointment on Wed 3/10.

## 2021-03-10 ENCOUNTER — HOSPITAL ENCOUNTER (OUTPATIENT)
Dept: LAB | Facility: MEDICAL CENTER | Age: 31
End: 2021-03-10
Attending: INTERNAL MEDICINE
Payer: COMMERCIAL

## 2021-03-10 DIAGNOSIS — R79.1 ELEVATED PARTIAL THROMBOPLASTIN TIME (PTT): ICD-10-CM

## 2021-03-10 DIAGNOSIS — R19.7 DIARRHEA, UNSPECIFIED TYPE: ICD-10-CM

## 2021-03-10 DIAGNOSIS — E83.118 OTHER HEMOCHROMATOSIS: ICD-10-CM

## 2021-03-10 DIAGNOSIS — L65.9 HAIR LOSS: ICD-10-CM

## 2021-03-10 LAB
CRP SERPL HS-MCNC: 0.7 MG/L (ref 0–7.5)
TSH SERPL DL<=0.005 MIU/L-ACNC: 1.11 UIU/ML (ref 0.38–5.33)

## 2021-03-10 PROCEDURE — 84443 ASSAY THYROID STIM HORMONE: CPT

## 2021-03-10 PROCEDURE — 86038 ANTINUCLEAR ANTIBODIES: CPT

## 2021-03-10 PROCEDURE — 85613 RUSSELL VIPER VENOM DILUTED: CPT

## 2021-03-10 PROCEDURE — 85240 CLOT FACTOR VIII AHG 1 STAGE: CPT

## 2021-03-10 PROCEDURE — 85520 HEPARIN ASSAY: CPT

## 2021-03-10 PROCEDURE — 85730 THROMBOPLASTIN TIME PARTIAL: CPT

## 2021-03-10 PROCEDURE — 86141 C-REACTIVE PROTEIN HS: CPT

## 2021-03-10 PROCEDURE — 36415 COLL VENOUS BLD VENIPUNCTURE: CPT

## 2021-03-10 PROCEDURE — 85610 PROTHROMBIN TIME: CPT

## 2021-03-10 ASSESSMENT — ENCOUNTER SYMPTOMS
BRUISES/BLEEDS EASILY: 0
PHOTOPHOBIA: 0
CHILLS: 0
INSOMNIA: 0
COUGH: 0
WEIGHT LOSS: 0
MYALGIAS: 0
NAUSEA: 0
VOMITING: 0
TINGLING: 0
DIARRHEA: 0
SPUTUM PRODUCTION: 0
NERVOUS/ANXIOUS: 0
DIZZINESS: 0
DIAPHORESIS: 0
BLURRED VISION: 0
FEVER: 0
DEPRESSION: 0
SENSORY CHANGE: 0
HEADACHES: 0
SHORTNESS OF BREATH: 0
CONSTIPATION: 0
WHEEZING: 0
DOUBLE VISION: 0

## 2021-03-10 NOTE — PROGRESS NOTES
Follow Up Note:  Hematology/Oncology      Primary Care:  Johnathon Patel M.D.    Diagnosis:    Chief Complaint:    History of Presenting Illness:  KYLAH Christie is a 30 y.o. male  with a recent history of bilateral pneumonia with hypoxia requiring hospitalization after a trip to [facility].  His COVID-19 test was negative.  The patient had travels with his parents so his wife and his child and was the only one that developed pneumonia      He establish care with his primary care physician in November 2019, was noted to have a hemoglobin of 12.9 and MCV of 84 white count of 5000 hemoglobin 14.1 neutrophil count 67%.   A repeat the CBC a month ago show a white count of 5.2 hemoglobin 14.1 MCV 87.7 platelet count 274,000 and the neutrophil count of 2.3.   A ferritin done 3 months ago was 718 and a repeated one was 723 a month ago.  Hemoglobin A1c was 6.1.  Metabolic panel done 3 months ago showed a sodium of 133 and a potassium of 3.5 and a serum creatinine of 1.1 otherwise unremarkable.  Repeated CMP done a month ago was entirely within normal limit. The hemachromatosis panel was ordered on 12/24/2020 was negative.     The patient is primarily complaining of fatigue not able to do the same degree of exercise he was doing prior to his pneumonia.  He denies decreased sexual drive.  Denies joint pain joint stiffness and swelling.  Report sporadic palpitation since childhood.    Interval History:  Patient is here for follow up visit.      Past Medical History:   Diagnosis Date   • Allergy to pollen 2014   • Anemia 11/13/2020    Hg 12 Ordered ffib, Hg electro   • Bleeding nose 1999   • Heart palpitations 1998   • Wrist fracture, bilateral        Allergies as of 03/18/2021   • (No Known Allergies)       No current outpatient medications on file.      Review of Systems:  Review of Systems   Constitutional: Negative for chills, diaphoresis, fever, malaise/fatigue and weight loss.   HENT: Negative for nosebleeds and  tinnitus.    Eyes: Negative for blurred vision, double vision and photophobia.   Respiratory: Negative for cough, sputum production, shortness of breath and wheezing.    Cardiovascular: Negative for chest pain and leg swelling.   Gastrointestinal: Negative for constipation, diarrhea, nausea and vomiting.   Genitourinary: Negative for dysuria.   Musculoskeletal: Negative for joint pain and myalgias.   Skin: Negative for rash.   Neurological: Negative for dizziness, tingling, sensory change and headaches.   Endo/Heme/Allergies: Does not bruise/bleed easily.   Psychiatric/Behavioral: Negative for depression. The patient is not nervous/anxious and does not have insomnia.          Physical Exam:  Vitals:    03/18/21 1014   BP: 114/70   Pulse: 80   Resp: 14   Temp: 36.9 °C (98.4 °F)   TempSrc: Temporal   SpO2: 96%   Weight: 85.9 kg (189 lb 4.2 oz)   Height: 1.829 m (6')       Physical Exam  Vitals reviewed.   Constitutional:       General: He is not in acute distress.  HENT:      Head: Normocephalic.      Mouth/Throat:      Mouth: Mucous membranes are moist.   Eyes:      Extraocular Movements: Extraocular movements intact.      Pupils: Pupils are equal, round, and reactive to light.   Cardiovascular:      Rate and Rhythm: Normal rate and regular rhythm.      Pulses: Normal pulses.      Heart sounds: Normal heart sounds.   Pulmonary:      Effort: Pulmonary effort is normal.      Breath sounds: Normal breath sounds.   Abdominal:      General: Bowel sounds are normal.      Palpations: Abdomen is soft.      Tenderness: There is no abdominal tenderness.   Musculoskeletal:         General: Normal range of motion.      Cervical back: Normal range of motion and neck supple.   Lymphadenopathy:      Head:      Right side of head: No submental, submandibular, tonsillar, preauricular, posterior auricular or occipital adenopathy.      Left side of head: No submental, submandibular, tonsillar, preauricular, posterior auricular or  occipital adenopathy.      Cervical: No cervical adenopathy.      Right cervical: No superficial, deep or posterior cervical adenopathy.     Left cervical: No superficial, deep or posterior cervical adenopathy.      Upper Body:      Right upper body: No supraclavicular, axillary, pectoral or epitrochlear adenopathy.      Left upper body: No supraclavicular, axillary, pectoral or epitrochlear adenopathy.      Lower Body: No right inguinal adenopathy. No left inguinal adenopathy.   Skin:     General: Skin is warm and dry.   Neurological:      General: No focal deficit present.      Mental Status: He is alert and oriented to person, place, and time.   Psychiatric:         Mood and Affect: Mood normal.           Labs:  No visits with results within 1 Day(s) from this visit.   Latest known visit with results is:   Hospital Outpatient Visit on 02/05/2021   Component Date Value Ref Range Status   • VWF Activity 02/05/2021 57  51 - 215 % Final    Comment: REFERENCE INTERVAL: von Willebrand Factor, Activity (RCF)  Access complete set of age- and/or gender-specific reference  intervals for this test in the [facility] Test Directory  ([email or web address]).  Performed by [facility],  [address] [place] [address] [phone]  www.[facility] [employer] [facility], Elvia Carranza MD - Lab. Director     • vWF Antigen 02/05/2021 46* 52 - 214 % Final    Comment: REFERENCE INTERVAL: von Willebrand Factor, Antigen  Access complete set of age- and/or gender-specific reference  intervals for this test in the [facility] Test Directory  ([email or web address]).  Performed by [facility],  [address] [facility] [address] [phone]  www.[facility], Elvia Carranza MD - Lab. Director     • HIV Ag/Ab Combo Assay 02/05/2021 Non-Reactive  Non Reactive Final    Comment: Roche HIV is a diagnostic test for the qualitative determination of HIV-1  p24 antigen and antibodies to HIV-1 (HIV-1 groups M and O) and HIV-2 in  human serum and plasma. A negative screen does  not preclude the possibility  of exposure or infection. Consider retesting in high-risk patients, if  clinically indicated.     • Testosterone,Total 02/05/2021 354  175 - 781 ng/dL Final   • Ferritin 02/05/2021 653.0* 22.0 - 322.0 ng/mL Final   • Iron 02/05/2021 125  50 - 180 ug/dL Final   • Total Iron Binding 02/05/2021 351  250 - 450 ug/dL Final   • Unsat Iron Binding 02/05/2021 226  110 - 370 ug/dL Final   • % Saturation 02/05/2021 36  15 - 55 % Final   • Hemoglobin A1 02/05/2021 96.3  95.0 - 97.9 % Final   • Hemoglobin A2 02/05/2021 2.9  2.0 - 3.5 % Final   • Hemoglobin F 02/05/2021 0.8  0.0 - 2.1 % Final    Comment: REFERENCE INTERVAL: Hemoglobin F  Access complete set of age- and/or gender-specific reference  intervals for this test in the [facility] Test Directory  ([email or web address]).     • Hemoglobin S 02/05/2021 0.0  0.0 - 0.0 % Final   • Hemaglobin C 02/05/2021 0.0  0.0 - 0.0 % Final   • Hemoglobin E 02/05/2021 0.0  0.0 - 0.0 % Final   • Hemoglobin Other 02/05/2021 0.0  0.0 - 0.0 % Final   • Hemoglobin Eval 02/05/2021 See Note   Final    Comment: Impression: Normal HPLC evaluation.  This normal HPLC result does not rule out the possibility of alpha  globin gene deletions associated with silent carrier status or  alpha thalassemia trait. Individuals who carry a rare, Greek beta  thalassemia variant often have a normal Hb A2 and may not be  identified by this assay. Please correlate with clinical and  laboratory findings.     • Hemoglobin,Capillary Electrophores* 02/05/2021 Not Performed   Final    Comment: Performed By: [facility]  [address] [place], [place] [id number]  : Elvia Carranza MD     • Hgb Solubility 02/05/2021 Not Performed   Final   • Haptoglobin 02/05/2021 140  30 - 200 mg/dL Final    Comment: Performed By: [facility]  [address] [place], [place] [id number]  : Elvia Carranza MD     • Sodium 02/05/2021 139  135 - 145 mmol/L Final   •  Potassium 02/05/2021 4.1  3.6 - 5.5 mmol/L Final   • Chloride 02/05/2021 104  96 - 112 mmol/L Final   • Co2 02/05/2021 22  20 - 33 mmol/L Final   • Anion Gap 02/05/2021 13.0  7.0 - 16.0 Final   • Glucose 02/05/2021 95  65 - 99 mg/dL Final   • Bun 02/05/2021 17  8 - 22 mg/dL Final   • Creatinine 02/05/2021 0.93  0.50 - 1.40 mg/dL Final   • Calcium 02/05/2021 9.6  8.5 - 10.5 mg/dL Final   • AST(SGOT) 02/05/2021 26  12 - 45 U/L Final   • ALT(SGPT) 02/05/2021 31  2 - 50 U/L Final   • Alkaline Phosphatase 02/05/2021 39  30 - 99 U/L Final   • Total Bilirubin 02/05/2021 0.6  0.1 - 1.5 mg/dL Final   • Albumin 02/05/2021 5.0* 3.2 - 4.9 g/dL Final   • Total Protein 02/05/2021 8.1  6.0 - 8.2 g/dL Final   • Globulin 02/05/2021 3.1  1.9 - 3.5 g/dL Final   • A-G Ratio 02/05/2021 1.6  g/dL Final   • WBC 02/05/2021 4.1* 4.8 - 10.8 K/uL Final   • RBC 02/05/2021 5.29  4.70 - 6.10 M/uL Final   • Hemoglobin 02/05/2021 14.9  14.0 - 18.0 g/dL Final   • Hematocrit 02/05/2021 47.4  42.0 - 52.0 % Final   • MCV 02/05/2021 89.6  81.4 - 97.8 fL Final   • MCH 02/05/2021 28.2  27.0 - 33.0 pg Final   • MCHC 02/05/2021 31.4* 33.7 - 35.3 g/dL Final   • RDW 02/05/2021 42.7  35.9 - 50.0 fL Final   • Platelet Count 02/05/2021 276  164 - 446 K/uL Final   • MPV 02/05/2021 10.0  9.0 - 12.9 fL Final   • Neutrophils-Polys 02/05/2021 45.50  44.00 - 72.00 % Final   • Lymphocytes 02/05/2021 44.50* 22.00 - 41.00 % Final   • Monocytes 02/05/2021 6.40  0.00 - 13.40 % Final   • Eosinophils 02/05/2021 2.90  0.00 - 6.90 % Final   • Basophils 02/05/2021 0.70  0.00 - 1.80 % Final   • Immature Granulocytes 02/05/2021 0.00  0.00 - 0.90 % Final   • Nucleated RBC 02/05/2021 0.00  /100 WBC Final   • Neutrophils (Absolute) 02/05/2021 1.85  1.82 - 7.42 K/uL Final    Includes immature neutrophils, if present.   • Lymphs (Absolute) 02/05/2021 1.81  1.00 - 4.80 K/uL Final   • Monos (Absolute) 02/05/2021 0.26  0.00 - 0.85 K/uL Final   • Eos (Absolute) 02/05/2021 0.12  0.00 -  0.51 K/uL Final   • Baso (Absolute) 02/05/2021 0.03  0.00 - 0.12 K/uL Final   • Immature Granulocytes (abs) 02/05/2021 0.00  0.00 - 0.11 K/uL Final   • NRBC (Absolute) 02/05/2021 0.00  K/uL Final   • PT 02/05/2021 12.1  12.0 - 14.6 sec Final   • INR 02/05/2021 0.87  0.87 - 1.13 Final    Comment: INR - Non-therapeutic Reference Range: 0.87-1.13  INR - Therapeutic Reference Range: 2.0-4.0     • APTT 02/05/2021 37.4* 24.7 - 36.0 sec Final   • GFR If  02/05/2021 >60  >60 mL/min/1.73 m 2 Final   • GFR If Non  02/05/2021 >60  >60 mL/min/1.73 m 2 Final       Imaging:   MR-ABDOMEN-WITH & W/O    Result Date: 3/3/2021  3/3/2021 8:38 AM HISTORY/REASON FOR EXAM:  Hemochromatosis; 30 year old  with ferritine over 700 x2 Hb A1c 6.1 c/w pre-diabetes,  no HFE mutation; differential involve hemochromatosis secondary to rare mutation . Please asses degree of iron accumulation in the liver TECHNIQUE/EXAM DESCRIPTION: MRI of the liver with dynamic IV gadolinium enhancement. MR imaging of the liver was performed. MR images of the liver were obtained with coronal single-shot fast spin-echo T2, fat-suppressed axial FRFSE T2, axial in-phase and out-of-phase FSPGR T1, precontrast fat-suppressed FSPGR T1, dynamic gadolinium enhanced axial fat-suppressed T1 FSPGR in the arterial dominant, portal venous, and 2-minute and 4-minute delayed phases, with delayed coronal fat-suppressed T1 FSPGR sequence.. The study was performed on a [employer]ama 1.5 Paola MRI scanner. 17 mL ProHance contrast was administered intravenously. COMPARISON: None available. FINDINGS: Liver: The liver is normal in size and configuration. No signal change seen in the in and out of phase images to suggest significant iron deposition in the liver. No focal hepatic lesion is seen. Bile Ducts: No intrahepatic or extrahepatic bile duct dilation or lesion seen. Spleen: Normal in size without focal lesion. Gallbladder: Normal. Pancreas and  Pancreatic Duct: Normal. No pancreatic duct dilation is seen. Kidneys: Normal. Adrenal Glands: Normal. Ascites: Not present. Lymph Nodes: No abdominal lymphadenopathy is seen. Visualized Bowel: Grossly normal. Bones: No suspicious osseous lesions are noted.     1. No signal change seen in the in and out of phase images to suggest significant iron deposition in the liver. 2. Normal signal in the spleen and marrow. No evidence of iron deposition in these organs.         Assessment & Plan:    30-year-old  male with elevated ferritin saturation 36%  No sign of hemolysis based on a normal serum haptoglobin  Slightly decreased von Willebrand antigen but with activity over 50% is not consistent with type I from Willebrand disease.   The decreased level of form Willebrand antigen is most likely secondary to other causes.   Elevated PTT  Review of systems significant for hair loss, fatigue and intermittent diarrhea.  The MRI of the abdomen for assessment of iron overload is pending  We will follow-up after the MRI  At this point we will not have any change in management  Will order mixing study for the PTT  And coagulation factor testing to explain the elevation of the PTT.  Will order antinuclear antibody in view of the remote possibility of an autoimmune disorder and C-reactive protein  Thyroid function test  Stool for parasite  We have communicated our plan complete the assessment and revised the review of system with the help of a Chinese     3/10/21  The MRI of the liver revealed no evidence of iron overload  The test ordered on 2/10/2021 were not completed    he agreed and verbalized his agreement and understanding with the current plan. I answered all questions and concerns he has at this time and advised him to call at any time in the interim with questions or concerns in regards to his care.       Please note that this dictation was created using voice recognition software. I have made every  reasonable attempt to correct obvious errors, but I expect that there are errors of grammar and possibly content that I did not discover before finalizing the note.      Thank you for allowing me to participate in his care, I will continue to follow.

## 2021-03-11 LAB
APTT PPP: 33 SEC (ref 24.7–36)
FACT VIII ACT/NOR PPP: 50 % (ref 45–145)
INHIBITOR INDICATED 1863: NO
INR PPP: 0.81 (ref 0.87–1.13)
LA PPP-IMP: ABNORMAL
PROTHROMBIN TIME: 11.4 SEC (ref 12–14.6)
SCREEN DRVVT: 34.4 SEC (ref 28–48)
UFH PPP CHRO-ACNC: <0.1 U/ML

## 2021-03-12 LAB — NUCLEAR IGG SER QL IA: NORMAL

## 2021-03-15 ENCOUNTER — HOSPITAL ENCOUNTER (OUTPATIENT)
Facility: MEDICAL CENTER | Age: 31
End: 2021-03-15
Attending: INTERNAL MEDICINE
Payer: COMMERCIAL

## 2021-03-15 DIAGNOSIS — R19.7 DIARRHEA, UNSPECIFIED TYPE: ICD-10-CM

## 2021-03-15 PROCEDURE — 87177 OVA AND PARASITES SMEARS: CPT

## 2021-03-15 PROCEDURE — 87209 SMEAR COMPLEX STAIN: CPT

## 2021-03-17 ASSESSMENT — ENCOUNTER SYMPTOMS
INSOMNIA: 0
BRUISES/BLEEDS EASILY: 0
FEVER: 0
CHILLS: 0
NAUSEA: 0
SPUTUM PRODUCTION: 0
WEIGHT LOSS: 0
DEPRESSION: 0
SHORTNESS OF BREATH: 0
MYALGIAS: 0
COUGH: 0
DIZZINESS: 0
NERVOUS/ANXIOUS: 0
PHOTOPHOBIA: 0
HEADACHES: 0
TINGLING: 0
DOUBLE VISION: 0
BLURRED VISION: 0
CONSTIPATION: 0
VOMITING: 0
DIAPHORESIS: 0
SENSORY CHANGE: 0
WHEEZING: 0
DIARRHEA: 0

## 2021-03-17 NOTE — PROGRESS NOTES
Follow Up Note:  Hematology/Oncology      Primary Care:  Johnathon Patel M.D.    Diagnosis: Iron overload    Chief Complaint:    History of Presenting Illness:  KYLAH Christie is a 30 y.o. male with a recent history of bilateral pneumonia with hypoxia requiring hospitalization after a trip to [facility].  His COVID-19 test was negative.  The patient had travels with his parents so his wife and his child and was the only one that developed pneumonia      He establish care with his primary care physician in November 2019, was noted to have a hemoglobin of 12.9 and MCV of 84 white count of 5000 hemoglobin 14.1 neutrophil count 67%.   A repeat the CBC a month ago show a white count of 5.2 hemoglobin 14.1 MCV 87.7 platelet count 274,000 and the neutrophil count of 2.3.   A ferritin done 3 months ago was 718 and a repeated one was 723 a month ago.  Hemoglobin A1c was 6.1.  Metabolic panel done 3 months ago showed a sodium of 133 and a potassium of 3.5 and a serum creatinine of 1.1 otherwise unremarkable.  Repeated CMP done a month ago was entirely within normal limit. The hemachromatosis panel was ordered on 12/24/2020 was negative.     The patient is primarily complaining of fatigue not able to do the same degree of exercise he was doing prior to his pneumonia.  He denies decreased sexual drive.  Denies joint pain joint stiffness and swelling.  Report sporadic palpitation since childhood.  Interval History:  Patient is here for follow up visit.     Past Medical History:   Diagnosis Date   • Allergy to pollen 2014   • Anemia 11/13/2020    Hg 12 Ordered ffib, Hg electro   • Bleeding nose 1999   • Heart palpitations 1998   • Wrist fracture, bilateral        Allergies as of 03/18/2021   • (No Known Allergies)       No current outpatient medications on file.      Review of Systems:  Review of Systems   Constitutional: Negative for chills, diaphoresis, fever, malaise/fatigue and weight loss.   HENT: Negative for nosebleeds  and tinnitus.    Eyes: Negative for blurred vision, double vision and photophobia.   Respiratory: Negative for cough, sputum production, shortness of breath and wheezing.    Cardiovascular: Negative for chest pain and leg swelling.   Gastrointestinal: Negative for constipation, diarrhea, nausea and vomiting.   Genitourinary: Negative for dysuria.   Musculoskeletal: Negative for joint pain and myalgias.   Skin: Negative for rash.   Neurological: Negative for dizziness, tingling, sensory change and headaches.   Endo/Heme/Allergies: Does not bruise/bleed easily.   Psychiatric/Behavioral: Negative for depression. The patient is not nervous/anxious and does not have insomnia.          Physical Exam:  Vitals:    03/18/21 1014   BP: 114/70   Pulse: 80   Resp: 14   Temp: 36.9 °C (98.4 °F)   TempSrc: Temporal   SpO2: 96%   Weight: 85.9 kg (189 lb 4.2 oz)   Height: 1.829 m (6')       Physical Exam  Vitals reviewed.   Constitutional:       General: He is not in acute distress.  HENT:      Head: Normocephalic.      Mouth/Throat:      Mouth: Mucous membranes are moist.   Eyes:      Extraocular Movements: Extraocular movements intact.      Pupils: Pupils are equal, round, and reactive to light.   Cardiovascular:      Rate and Rhythm: Normal rate and regular rhythm.      Pulses: Normal pulses.      Heart sounds: Normal heart sounds.   Pulmonary:      Effort: Pulmonary effort is normal.      Breath sounds: Normal breath sounds.   Abdominal:      General: Bowel sounds are normal.      Palpations: Abdomen is soft.      Tenderness: There is no abdominal tenderness.   Musculoskeletal:         General: Normal range of motion.      Cervical back: Normal range of motion and neck supple.   Lymphadenopathy:      Head:      Right side of head: No submental, submandibular, tonsillar, preauricular, posterior auricular or occipital adenopathy.      Left side of head: No submental, submandibular, tonsillar, preauricular, posterior auricular or  occipital adenopathy.      Cervical: No cervical adenopathy.      Right cervical: No superficial, deep or posterior cervical adenopathy.     Left cervical: No superficial, deep or posterior cervical adenopathy.      Upper Body:      Right upper body: No supraclavicular, axillary, pectoral or epitrochlear adenopathy.      Left upper body: No supraclavicular, axillary, pectoral or epitrochlear adenopathy.      Lower Body: No right inguinal adenopathy. No left inguinal adenopathy.   Skin:     General: Skin is warm and dry.   Neurological:      General: No focal deficit present.      Mental Status: He is alert and oriented to person, place, and time.   Psychiatric:         Mood and Affect: Mood normal.           Labs:  No visits with results within 1 Day(s) from this visit.   Latest known visit with results is:   Hospital Outpatient Visit on 03/10/2021   Component Date Value Ref Range Status   • Antinuclear Antibody 03/10/2021 None Detected  None Detected Final    Comment: If suspicion of connective tissue disease is strong and BRYANT EIA is  negative, consider testing for BRYANT by IFA ([id number]).  No antibodies to Anti-Nuclear Antibodies (BRYANT) detected.  The  Extractable Nuclear Antigen Antibodies (RNP, Talbot, SSA 52, SSA  60, Scleroderma, Blanca-1 and SSB) and Double Stranded DNA (dsDNA)  Antibody, IgG will not be performed.  INTERPRETIVE INFORMATION: Anti-Nuclear Antibodies (BRYANT), IgG by  LALA  Antinuclear Antibodies (BRYANT), IgG by LALA: BRYANT specimens are  screened using enzyme-linked immunosorbent assay (LALA)  methodology. All LALA results reported as Detected are further  tested by indirect fluorescent assay (IFA) using HEp-2 substrate  with an IgG-specific conjugate. The BRYANT LALA screen is designed  to detect antibodies against dsDNA, histones, SS-A (Ro), SS-B  (La), Talbot, Talbot/RNP, Scl-70, Blanca-1, centromeric proteins, other  antigens extracted from the HEp-2 cell nucleus. BRYANT LALA assays  have been reported to have  lower sensitivities than BRYANT IFA fo                           r  systemic autoimmune rheumatic diseases (SARD).  Negative results do not necessarily rule out SARD.  Performed By: [facility]  [address] [place] [address]  : Elvia Carranza MD     • TSH 03/10/2021 1.110  0.380 - 5.330 uIU/mL Final    Comment: Please note new reference ranges effective 12/14/2017 10:00 AM  Pregnant Females, 1st Trimester  0.050-3.700  Pregnant Females, 2nd Trimester  0.310-4.350  Pregnant Females, 3rd Trimester  0.410-5.180     • C Reactive Protein High Sensitive 03/10/2021 0.7  0.0 - 7.5 mg/L Final    Comment: CDC/AHA Recommendations for Relative Risk Categories for hsCRP  Relative Risk                 Average hs-CRP level  Low                             <1.0 mg/L  Average Risk                    1.0-3.0 mg/L  High Risk                       >3.0 mg/L  Increased hs-CRP values are non-specific and should not be  interpreted without a complete clinical history. hs-CRP  levels should be measured twice (averaging the results),  optimally 2 weeks apart, fasting or non-fasting.  hs-CRP  should be used in conjunction with conventional risk assess-  ment for cardiovascular disease to guide therapy decisions.     • Factor VIII 03/10/2021 50.0  45.0 - 145.0 % Final   • Inhibitor Indicated 03/10/2021 No   Final    Comment: Patients on anticoagulant therapy or samples exposed to anticoagulants  may cause false positive results for Factor Inhibitors.  Testing  should be performed ten days after the last dose of therapy.     • PT 03/10/2021 11.4* 12.0 - 14.6 sec Final   • INR 03/10/2021 0.81* 0.87 - 1.13 Final    Comment: INR - Non-therapeutic Reference Range: 0.87-1.13  INR - Therapeutic Reference Range: 2.0-4.0     • APTT 03/10/2021 33.0  24.7 - 36.0 sec Final   • Heparin Anti-Xa 03/10/2021 <0.1  U/mL Final    Comment: Reference ranges have not been established for this assay.  Result interpretation should  include consideration of patient's  medical condition and clinical presentation.     • Dilute Rick Viper Venom Johnathon 03/10/2021 34.4  28.0 - 48.0 sec Final   • Lupus Anticoagulant 03/10/2021 Not Present   Final       Imaging:   MR-ABDOMEN-WITH & W/O    Result Date: 3/3/2021  3/3/2021 8:38 AM HISTORY/REASON FOR EXAM:  Hemochromatosis; 30 year old  with ferritine over 700 x2 Hb A1c 6.1 c/w pre-diabetes,  no HFE mutation; differential involve hemochromatosis secondary to rare mutation . Please asses degree of iron accumulation in the liver TECHNIQUE/EXAM DESCRIPTION: MRI of the liver with dynamic IV gadolinium enhancement. MR imaging of the liver was performed. MR images of the liver were obtained with coronal single-shot fast spin-echo T2, fat-suppressed axial FRFSE T2, axial in-phase and out-of-phase FSPGR T1, precontrast fat-suppressed FSPGR T1, dynamic gadolinium enhanced axial fat-suppressed T1 FSPGR in the arterial dominant, portal venous, and 2-minute and 4-minute delayed phases, with delayed coronal fat-suppressed T1 FSPGR sequence.. The study was performed on a [email or web address] Signa 1.5 [place] MRI scanner. 17 mL ProHance contrast was administered intravenously. COMPARISON: None available. FINDINGS: Liver: The liver is normal in size and configuration. No signal change seen in the in and out of phase images to suggest significant iron deposition in the liver. No focal hepatic lesion is seen. Bile Ducts: No intrahepatic or extrahepatic bile duct dilation or lesion seen. Spleen: Normal in size without focal lesion. Gallbladder: Normal. Pancreas and Pancreatic Duct: Normal. No pancreatic duct dilation is seen. Kidneys: Normal. Adrenal Glands: Normal. Ascites: Not present. Lymph Nodes: No abdominal lymphadenopathy is seen. Visualized Bowel: Grossly normal. Bones: No suspicious osseous lesions are noted.     1. No signal change seen in the in and out of phase images to suggest significant iron deposition in the liver. 2.  Normal signal in the spleen and marrow. No evidence of iron deposition in these organs.       Assessment & Plan:  30-year-old  male with elevated ferritin saturation 36%  No sign of hemolysis based on a normal serum haptoglobin  Slightly decreased von Willebrand antigen with normal activity not c/w of type I von Willebrand disease.  Elevated PTT  Review of systems significant for hair loss, fatigue and intermittent diarrhea.  The MRI of the abdomen for assessment of iron overload is pending  We will follow-up after the MRI  At this point we will not have any change in management  Will order mixing study for the PTT  And coagulation factor testing to explain the elevation of the PTT.  Will order antinuclear antibody in view of the remote possibility of an autoimmune disorder and C-reactive protein  Thyroid function test  Stool for parasite  We have communicated our plan complete the assessment and revised the review of system with the help of a Chinese     3/18/21  Low PT  Normal FVIII  NO LAC  MRI with no evidence of iron overload  Iron sat 36%  Normal CRP  Elevated CPK 10/5/2020  Mild leucopenia    Rt in 3 months  Cbc   crp  CPK  CMP  TSH  Testosterone level  HbA1c  ferritine   Iron sat  Continue to monitor the iron overload  A Mandarin  was used for the communication of the test results and the plan of treatment  he agreed and verbalized his agreement and understanding with the current plan. I answered all questions and concerns he has at this time and advised him to call at any time in the interim with questions or concerns in regards to his care.       Please note that this dictation was created using voice recognition software. I have made every reasonable attempt to correct obvious errors, but I expect that there are errors of grammar and possibly content that I did not discover before finalizing the note.      Thank you for allowing me to participate in his care, I will continue to  follow.

## 2021-03-18 ENCOUNTER — OFFICE VISIT (OUTPATIENT)
Dept: HEMATOLOGY ONCOLOGY | Facility: MEDICAL CENTER | Age: 31
End: 2021-03-18
Payer: COMMERCIAL

## 2021-03-18 VITALS
RESPIRATION RATE: 14 BRPM | TEMPERATURE: 98.4 F | BODY MASS INDEX: 25.64 KG/M2 | DIASTOLIC BLOOD PRESSURE: 70 MMHG | HEART RATE: 80 BPM | HEIGHT: 72 IN | SYSTOLIC BLOOD PRESSURE: 114 MMHG | WEIGHT: 189.26 LBS | OXYGEN SATURATION: 96 %

## 2021-03-18 DIAGNOSIS — R79.1 ELEVATED PARTIAL THROMBOPLASTIN TIME (PTT): ICD-10-CM

## 2021-03-18 DIAGNOSIS — R04.0 EPISTAXIS, RECURRENT: ICD-10-CM

## 2021-03-18 DIAGNOSIS — E83.118 OTHER HEMOCHROMATOSIS: ICD-10-CM

## 2021-03-18 DIAGNOSIS — L65.9 HAIR LOSS: ICD-10-CM

## 2021-03-18 DIAGNOSIS — E83.19 IRON OVERLOAD: ICD-10-CM

## 2021-03-18 LAB — OVA AND PARASITE, FECAL INTERPRETATION Q0595: NEGATIVE

## 2021-03-18 PROCEDURE — 99214 OFFICE O/P EST MOD 30 MIN: CPT | Performed by: INTERNAL MEDICINE

## 2021-03-18 ASSESSMENT — FIBROSIS 4 INDEX: FIB4 SCORE: 0.49

## 2021-04-06 ENCOUNTER — IMMUNIZATION (OUTPATIENT)
Dept: FAMILY PLANNING/WOMEN'S HEALTH CLINIC | Facility: IMMUNIZATION CENTER | Age: 31
End: 2021-04-06
Payer: COMMERCIAL

## 2021-04-06 DIAGNOSIS — Z23 ENCOUNTER FOR VACCINATION: Primary | ICD-10-CM

## 2021-04-06 PROCEDURE — 91300 PFIZER SARS-COV-2 VACCINE: CPT

## 2021-04-06 PROCEDURE — 0001A PFIZER SARS-COV-2 VACCINE: CPT

## 2021-04-24 ENCOUNTER — IMMUNIZATION (OUTPATIENT)
Dept: FAMILY PLANNING/WOMEN'S HEALTH CLINIC | Facility: IMMUNIZATION CENTER | Age: 31
End: 2021-04-24
Payer: COMMERCIAL

## 2021-04-24 DIAGNOSIS — Z23 ENCOUNTER FOR VACCINATION: Primary | ICD-10-CM

## 2021-04-24 PROCEDURE — 0002A PFIZER SARS-COV-2 VACCINE: CPT

## 2021-04-24 PROCEDURE — 91300 PFIZER SARS-COV-2 VACCINE: CPT

## 2021-06-11 ENCOUNTER — HOSPITAL ENCOUNTER (OUTPATIENT)
Dept: LAB | Facility: MEDICAL CENTER | Age: 31
End: 2021-06-11
Attending: INTERNAL MEDICINE
Payer: COMMERCIAL

## 2021-06-11 DIAGNOSIS — E83.118 OTHER HEMOCHROMATOSIS: ICD-10-CM

## 2021-06-11 DIAGNOSIS — E83.19 IRON OVERLOAD: ICD-10-CM

## 2021-06-11 LAB
ALBUMIN SERPL BCP-MCNC: 4.7 G/DL (ref 3.2–4.9)
ALBUMIN/GLOB SERPL: 1.5 G/DL
ALP SERPL-CCNC: 45 U/L (ref 30–99)
ALT SERPL-CCNC: 51 U/L (ref 2–50)
ANION GAP SERPL CALC-SCNC: 14 MMOL/L (ref 7–16)
AST SERPL-CCNC: 36 U/L (ref 12–45)
BASOPHILS # BLD AUTO: 0.5 % (ref 0–1.8)
BASOPHILS # BLD: 0.03 K/UL (ref 0–0.12)
BILIRUB SERPL-MCNC: 0.4 MG/DL (ref 0.1–1.5)
BUN SERPL-MCNC: 19 MG/DL (ref 8–22)
CALCIUM SERPL-MCNC: 9.9 MG/DL (ref 8.5–10.5)
CHLORIDE SERPL-SCNC: 101 MMOL/L (ref 96–112)
CK SERPL-CCNC: 442 U/L (ref 0–154)
CO2 SERPL-SCNC: 22 MMOL/L (ref 20–33)
CREAT SERPL-MCNC: 0.98 MG/DL (ref 0.5–1.4)
CRP SERPL HS-MCNC: <0.3 MG/DL (ref 0–0.75)
EOSINOPHIL # BLD AUTO: 0.14 K/UL (ref 0–0.51)
EOSINOPHIL NFR BLD: 2.3 % (ref 0–6.9)
ERYTHROCYTE [DISTWIDTH] IN BLOOD BY AUTOMATED COUNT: 43.6 FL (ref 35.9–50)
EST. AVERAGE GLUCOSE BLD GHB EST-MCNC: 123 MG/DL
FERRITIN SERPL-MCNC: 510 NG/ML (ref 22–322)
GLOBULIN SER CALC-MCNC: 3.1 G/DL (ref 1.9–3.5)
GLUCOSE SERPL-MCNC: 103 MG/DL (ref 65–99)
HBA1C MFR BLD: 5.9 % (ref 4–5.6)
HCT VFR BLD AUTO: 44.2 % (ref 42–52)
HGB BLD-MCNC: 13.8 G/DL (ref 14–18)
IMM GRANULOCYTES # BLD AUTO: 0.02 K/UL (ref 0–0.11)
IMM GRANULOCYTES NFR BLD AUTO: 0.3 % (ref 0–0.9)
IRON SATN MFR SERPL: 27 % (ref 15–55)
IRON SERPL-MCNC: 98 UG/DL (ref 50–180)
LYMPHOCYTES # BLD AUTO: 2.24 K/UL (ref 1–4.8)
LYMPHOCYTES NFR BLD: 36.8 % (ref 22–41)
MCH RBC QN AUTO: 27.7 PG (ref 27–33)
MCHC RBC AUTO-ENTMCNC: 31.2 G/DL (ref 33.7–35.3)
MCV RBC AUTO: 88.6 FL (ref 81.4–97.8)
MONOCYTES # BLD AUTO: 0.5 K/UL (ref 0–0.85)
MONOCYTES NFR BLD AUTO: 8.2 % (ref 0–13.4)
NEUTROPHILS # BLD AUTO: 3.16 K/UL (ref 1.82–7.42)
NEUTROPHILS NFR BLD: 51.9 % (ref 44–72)
NRBC # BLD AUTO: 0 K/UL
NRBC BLD-RTO: 0 /100 WBC
PLATELET # BLD AUTO: 284 K/UL (ref 164–446)
PMV BLD AUTO: 9.6 FL (ref 9–12.9)
POTASSIUM SERPL-SCNC: 4 MMOL/L (ref 3.6–5.5)
PROT SERPL-MCNC: 7.8 G/DL (ref 6–8.2)
RBC # BLD AUTO: 4.99 M/UL (ref 4.7–6.1)
SODIUM SERPL-SCNC: 137 MMOL/L (ref 135–145)
TESTOST SERPL-MCNC: 138 NG/DL (ref 175–781)
TIBC SERPL-MCNC: 365 UG/DL (ref 250–450)
TSH SERPL DL<=0.005 MIU/L-ACNC: 1.72 UIU/ML (ref 0.38–5.33)
UIBC SERPL-MCNC: 267 UG/DL (ref 110–370)
WBC # BLD AUTO: 6.1 K/UL (ref 4.8–10.8)

## 2021-06-11 PROCEDURE — 83540 ASSAY OF IRON: CPT

## 2021-06-11 PROCEDURE — 80053 COMPREHEN METABOLIC PANEL: CPT

## 2021-06-11 PROCEDURE — 86140 C-REACTIVE PROTEIN: CPT

## 2021-06-11 PROCEDURE — 85025 COMPLETE CBC W/AUTO DIFF WBC: CPT

## 2021-06-11 PROCEDURE — 82728 ASSAY OF FERRITIN: CPT

## 2021-06-11 PROCEDURE — 83550 IRON BINDING TEST: CPT

## 2021-06-11 PROCEDURE — 36415 COLL VENOUS BLD VENIPUNCTURE: CPT

## 2021-06-11 PROCEDURE — 82550 ASSAY OF CK (CPK): CPT

## 2021-06-11 PROCEDURE — 83036 HEMOGLOBIN GLYCOSYLATED A1C: CPT

## 2021-06-11 PROCEDURE — 84443 ASSAY THYROID STIM HORMONE: CPT

## 2021-06-11 PROCEDURE — 84403 ASSAY OF TOTAL TESTOSTERONE: CPT

## 2021-06-17 ENCOUNTER — APPOINTMENT (OUTPATIENT)
Dept: HEMATOLOGY ONCOLOGY | Facility: MEDICAL CENTER | Age: 31
End: 2021-06-17
Payer: COMMERCIAL

## 2021-06-17 ENCOUNTER — OFFICE VISIT (OUTPATIENT)
Dept: HEMATOLOGY ONCOLOGY | Facility: MEDICAL CENTER | Age: 31
End: 2021-06-17

## 2021-06-17 VITALS
RESPIRATION RATE: 18 BRPM | WEIGHT: 197.86 LBS | HEART RATE: 86 BPM | SYSTOLIC BLOOD PRESSURE: 120 MMHG | OXYGEN SATURATION: 97 % | BODY MASS INDEX: 26.22 KG/M2 | DIASTOLIC BLOOD PRESSURE: 86 MMHG | TEMPERATURE: 98.2 F | HEIGHT: 73 IN

## 2021-06-17 DIAGNOSIS — R79.89 ELEVATED FERRITIN LEVEL: Chronic | ICD-10-CM

## 2021-06-17 DIAGNOSIS — E83.118 OTHER HEMOCHROMATOSIS: ICD-10-CM

## 2021-06-17 DIAGNOSIS — R74.8 ELEVATED CPK: ICD-10-CM

## 2021-06-17 DIAGNOSIS — Z09 ENCOUNTER FOR HEMATOLOGY FOLLOW-UP: ICD-10-CM

## 2021-06-17 DIAGNOSIS — R00.2 PALPITATIONS: ICD-10-CM

## 2021-06-17 PROBLEM — Z76.89 ENCOUNTER TO ESTABLISH CARE WITH NEW DOCTOR: Status: RESOLVED | Noted: 2020-11-13 | Resolved: 2021-06-17

## 2021-06-17 PROCEDURE — 99215 OFFICE O/P EST HI 40 MIN: CPT | Performed by: NURSE PRACTITIONER

## 2021-06-17 RX ORDER — CETIRIZINE HYDROCHLORIDE 10 MG/1
10 TABLET ORAL DAILY
COMMUNITY

## 2021-06-17 ASSESSMENT — ENCOUNTER SYMPTOMS
BLOOD IN STOOL: 0
DIZZINESS: 1
PALPITATIONS: 1
VOMITING: 0
CHILLS: 0
COUGH: 0
MYALGIAS: 0
TINGLING: 0
CONSTIPATION: 0
WEIGHT LOSS: 0
DIARRHEA: 1
BRUISES/BLEEDS EASILY: 0
SHORTNESS OF BREATH: 1
HEADACHES: 0
NAUSEA: 0
WHEEZING: 0
INSOMNIA: 0
FEVER: 0

## 2021-06-17 ASSESSMENT — PAIN SCALES - GENERAL: PAINLEVEL: NO PAIN

## 2021-06-17 ASSESSMENT — FIBROSIS 4 INDEX: FIB4 SCORE: 0.53

## 2021-06-17 NOTE — PROGRESS NOTES
Subjective:      Demetrius Christie is a 31 y.o. male who presents with Other (EST/Hemochromatosis/3 mth fv/labs prior)        HPI   Mr. Christie presents for evaluation of hemochromatosis/elevated ferritin. He is accompanied by his wife for today's visit, which was facilitated by  services.    Patient was referred per PCP in 11/2020 for further evaluation of elevated ferritin, without obvious cause, following significant URI/pneumonia: Hematology workup was facilitated by Dr. Buchanan and included: elevated ferritin and CPK; negative hemochromatosis (C2A2Y, H63D, S65C negative); normal TIBC; normal haptoglobin and normal C-reactive protein; negative BRYANT and Lupus anticoagulant. Patient is a non-smoker and does not take supplemental testosterone, which is low normal. Ferritin continues to slowly improve without therapeutic phlebotomy, patient continues with routine surveillance.    Patient continues with generalized fatigue since viral illness in the Fall. He has changed jobs and is more active, getting up earlier, and more busy but continues to not have the greatest activity tolerance and tires significantly by the end of the day/ He is not engaging in purposeful exercise, prior to viral illness he was working out 3-4 times weekly. He is experiencing intermittent palpitations/tachycardia ,with associated shortness of breath, which are self-limiting. Patient has experienced episodes of malaise over his lifetime with palpitations better controlled when he is consistently more active, which he presently does not have the energy for. He notes mild dizziness in the mornings that is self limiting. He does have persistent left back/shoulder discomfort, over several years, sometimes helped with acupuncture massage but always returns.         Review of Systems   Constitutional: Positive for malaise/fatigue (change in work, more busy, poorer activity tolearance since hospitalization in the fall). Negative for chills, fever  "and weight loss (up 8 lbs since march - beer, boots, muscle).   Respiratory: Positive for shortness of breath (with episodes of tachycardia). Negative for cough and wheezing.    Cardiovascular: Positive for palpitations (intermittent and self limiting but it is sudden onset; has had \"cardiac issues\" historically that are better when he is better conditioned - the past year has not been great in terms of activity). Negative for chest pain and leg swelling.   Gastrointestinal: Positive for diarrhea (some relation to diet but also spontaneous episodes; dairy is hard - will try lactaid supplement). Negative for blood in stool, constipation, melena, nausea and vomiting.   Genitourinary: Negative for dysuria.   Musculoskeletal: Positive for joint pain (left back/left shoulder - since prior to new job (over past 3 years); helped with accupuncture and massage but returns eventually). Negative for myalgias.   Neurological: Positive for dizziness (in the am - intermittent and self limiting). Negative for tingling and headaches.   Endo/Heme/Allergies: Does not bruise/bleed easily.   Psychiatric/Behavioral: The patient does not have insomnia (working longer hours and up earlier).            No Known Allergies        Current Outpatient Medications on File Prior to Visit   Medication Sig Dispense Refill   • cetirizine (ZYRTEC) 10 MG Tab Take 10 mg by mouth every day.       No current facility-administered medications on file prior to visit.              Objective:     /86   Pulse 86   Temp 36.8 °C (98.2 °F) (Temporal)   Resp 18   Ht 1.854 m (6' 1\")   Wt 89.8 kg (197 lb 13.8 oz)   SpO2 97%   BMI 26.10 kg/m²      Physical Exam  Vitals reviewed.   Constitutional:       General: He is not in acute distress.     Appearance: He is well-developed. He is not diaphoretic.   HENT:      Head: Normocephalic and atraumatic.   Eyes:      General: No scleral icterus.        Right eye: No discharge.         Left eye: No discharge. "   Cardiovascular:      Rate and Rhythm: Normal rate and regular rhythm.      Heart sounds: Normal heart sounds. No murmur heard.   No friction rub. No gallop.    Pulmonary:      Effort: Pulmonary effort is normal. No respiratory distress.      Breath sounds: Normal breath sounds. No wheezing.   Abdominal:      General: There is no distension.      Palpations: Abdomen is soft.      Tenderness: There is no abdominal tenderness.   Musculoskeletal:         General: Tenderness (L shoulder) present. Normal range of motion.      Cervical back: Normal range of motion.   Skin:     General: Skin is warm and dry.      Coloration: Skin is not jaundiced or pale.      Findings: No bruising, erythema, lesion or rash.   Neurological:      Mental Status: He is alert and oriented to person, place, and time.   Psychiatric:         Behavior: Behavior normal.                                                MR Liver  3/3/2021 8:38 AM  HISTORY/REASON FOR EXAM:  Hemochromatosis; 30 year old  with ferritine over 700 x2 Hb A1c 6.1 c/w pre-diabetes,  no HFE mutation; differential involve hemochromatosis secondary to rare mutation . Please asses degree of iron accumulation in the liver     TECHNIQUE/EXAM DESCRIPTION: MRI of the liver with dynamic IV gadolinium enhancement.     MR imaging of the liver was performed. MR images of the liver were obtained with coronal single-shot fast spin-echo T2, fat-suppressed axial FRFSE T2, axial in-phase and out-of-phase FSPGR T1, precontrast fat-suppressed FSPGR T1, dynamic gadolinium   enhanced axial fat-suppressed T1 FSPGR in the arterial dominant, portal venous, and 2-minute and 4-minute delayed phases, with delayed coronal fat-suppressed T1 FSPGR sequence..     The study was performed on a [employer] 1.5 [place] MRI scanner.     17 mL ProHance contrast was administered intravenously.     COMPARISON: None available.     FINDINGS:  Liver: The liver is normal in size and configuration. No signal change  seen in the in and out of phase images to suggest significant iron deposition in the liver.     No focal hepatic lesion is seen.     Bile Ducts: No intrahepatic or extrahepatic bile duct dilation or lesion seen.     Spleen: Normal in size without focal lesion.     Gallbladder: Normal.     Pancreas and Pancreatic Duct: Normal. No pancreatic duct dilation is seen.     Kidneys: Normal.     Adrenal Glands: Normal.     Ascites: Not present.     Lymph Nodes: No abdominal lymphadenopathy is seen.     Visualized Bowel: Grossly normal.     Bones: No suspicious osseous lesions are noted.     IMPRESSION:  1. No signal change seen in the in and out of phase images to suggest significant iron deposition in the liver.     2. Normal signal in the spleen and marrow. No evidence of iron deposition in these organs.        Assessment/Plan:        1. Elevated ferritin level     2. Other hemochromatosis  CBC WITH DIFFERENTIAL    FERRITIN    URINALYSIS    MYOGLOBIN    PHOSPHORUS    URIC ACID   3. Encounter for hematology follow-up     4. Palpitations  EKG    REFERRAL TO CARDIOLOGY   5. Elevated CPK  URINALYSIS    MYOGLOBIN    PHOSPHORUS    URIC ACID    CARNITINE PANEL       1.  Elevated CPK: Increased from 199 in 10/2020 to 442 in 6/2021. Patient did not volunteer dark urine, h/o significant viral illness in Fall 2020 with subsequent persistent fatigue/malaise, c/o recurrent left back/shoulder discomfort. He has started a new, more physical, job and is more active than he has been over the last few months. CBC normal, mildly low Hgb this month. CMP within acceptable limits except for mild increase in glucose, A1c is mildly increased at 5.9, ALT mildly elevated, normal calcium. Additional testing will be completed for evaluation of subclinical rhabdomyolysis versus other potential metabolic myopathy: EKG, UA, urine myoglobin, Phos, uric acid, carnitine panel.    2.  Palpitations: Patient reports intermittent, although more frequent since  the Fall illness, palpitations with associated shortness of breath that are self-limiting. EKG has been ordered and patient is referred to cardiology for further evaluation.    3.  Elevated Ferritin: Negative for hematochromatosis, TIBC and iron studies normal, ferritin continues to decrease without therapeutic phlebotomy. We will continue to monitor, patient will repeat CBC in 3 months and return for reevaluation, sooner as needed.        My total time spent caring for the patient on the day of the encounter was 40 minutes. This does not include time spent on separately billable procedures/tests.      The patient verbalized agreement and understanding of current plan. All questions and concerns were addressed at time of visit.    Please note that this dictation was created using voice recognition software. I have made every reasonable attempt to correct obvious errors, but I expect that there are errors of grammar and possibly content that I did not discover before finalizing the note.

## 2021-06-17 NOTE — Clinical Note
Dr. Escalante,  I saw Demetrius today - his ferritin is slowly improving and we will continue to monitor.    I am concerned there is something metabolic going on as his CPK is higher (no recent injury but increased activity). I discussed with Dr. Arrington and am running some other labs - just wanted to give you a heads up in case you think of anything we may be missing.    JERILYN Mills

## 2021-07-17 ENCOUNTER — HOSPITAL ENCOUNTER (OUTPATIENT)
Dept: LAB | Facility: MEDICAL CENTER | Age: 31
End: 2021-07-17
Attending: NURSE PRACTITIONER
Payer: COMMERCIAL

## 2021-07-17 DIAGNOSIS — R74.8 ELEVATED CPK: ICD-10-CM

## 2021-07-17 DIAGNOSIS — E83.118 OTHER HEMOCHROMATOSIS: ICD-10-CM

## 2021-07-17 LAB
APPEARANCE UR: CLEAR
BASOPHILS # BLD AUTO: 1.3 % (ref 0–1.8)
BASOPHILS # BLD: 0.06 K/UL (ref 0–0.12)
BILIRUB UR QL STRIP.AUTO: NEGATIVE
COLOR UR: YELLOW
EOSINOPHIL # BLD AUTO: 0.15 K/UL (ref 0–0.51)
EOSINOPHIL NFR BLD: 3.2 % (ref 0–6.9)
ERYTHROCYTE [DISTWIDTH] IN BLOOD BY AUTOMATED COUNT: 40.6 FL (ref 35.9–50)
FERRITIN SERPL-MCNC: 445 NG/ML (ref 22–322)
GLUCOSE UR STRIP.AUTO-MCNC: NEGATIVE MG/DL
HCT VFR BLD AUTO: 44.5 % (ref 42–52)
HGB BLD-MCNC: 14.4 G/DL (ref 14–18)
IMM GRANULOCYTES # BLD AUTO: 0.01 K/UL (ref 0–0.11)
IMM GRANULOCYTES NFR BLD AUTO: 0.2 % (ref 0–0.9)
KETONES UR STRIP.AUTO-MCNC: NEGATIVE MG/DL
LEUKOCYTE ESTERASE UR QL STRIP.AUTO: NEGATIVE
LYMPHOCYTES # BLD AUTO: 2.23 K/UL (ref 1–4.8)
LYMPHOCYTES NFR BLD: 47.9 % (ref 22–41)
MCH RBC QN AUTO: 28.2 PG (ref 27–33)
MCHC RBC AUTO-ENTMCNC: 32.4 G/DL (ref 33.7–35.3)
MCV RBC AUTO: 87.1 FL (ref 81.4–97.8)
MICRO URNS: NORMAL
MONOCYTES # BLD AUTO: 0.4 K/UL (ref 0–0.85)
MONOCYTES NFR BLD AUTO: 8.6 % (ref 0–13.4)
MYOGLOBIN SERPL-MCNC: 22 NG/ML (ref 0–110)
NEUTROPHILS # BLD AUTO: 1.81 K/UL (ref 1.82–7.42)
NEUTROPHILS NFR BLD: 38.8 % (ref 44–72)
NITRITE UR QL STRIP.AUTO: NEGATIVE
NRBC # BLD AUTO: 0 K/UL
NRBC BLD-RTO: 0 /100 WBC
PH UR STRIP.AUTO: 6 [PH] (ref 5–8)
PHOSPHATE SERPL-MCNC: 3.8 MG/DL (ref 2.5–4.5)
PLATELET # BLD AUTO: 274 K/UL (ref 164–446)
PMV BLD AUTO: 9.4 FL (ref 9–12.9)
PROT UR QL STRIP: NEGATIVE MG/DL
RBC # BLD AUTO: 5.11 M/UL (ref 4.7–6.1)
RBC UR QL AUTO: NEGATIVE
SP GR UR STRIP.AUTO: 1.02
URATE SERPL-MCNC: 7.8 MG/DL (ref 2.5–8.3)
UROBILINOGEN UR STRIP.AUTO-MCNC: 0.2 MG/DL
WBC # BLD AUTO: 4.7 K/UL (ref 4.8–10.8)

## 2021-07-17 PROCEDURE — 82728 ASSAY OF FERRITIN: CPT

## 2021-07-17 PROCEDURE — 84100 ASSAY OF PHOSPHORUS: CPT

## 2021-07-17 PROCEDURE — 36415 COLL VENOUS BLD VENIPUNCTURE: CPT

## 2021-07-17 PROCEDURE — 85025 COMPLETE CBC W/AUTO DIFF WBC: CPT

## 2021-07-17 PROCEDURE — 84550 ASSAY OF BLOOD/URIC ACID: CPT

## 2021-07-17 PROCEDURE — 83874 ASSAY OF MYOGLOBIN: CPT

## 2021-07-17 PROCEDURE — 81003 URINALYSIS AUTO W/O SCOPE: CPT

## 2021-07-27 DIAGNOSIS — R74.8 ABNORMAL CPK: ICD-10-CM

## 2021-07-27 LAB
3OH-DODECANOYLCARN SERPL-SCNC: <0.01 UMOL/L
3OH-ISOVALERYLCARN SERPL-SCNC: 0.04 UMOL/L
3OH-LINOLEOYLCARN SERPL-SCNC: <0.01 UMOL/L
3OH-OLEOYLCARN SERPL-SCNC: 0.01 UMOL/L
3OH-PALMITOLEYLCARN SERPL-SCNC: 0.01 UMOL/L
3OH-PALMITOYLCARN SERPL-SCNC: 0.01 UMOL/L
3OH-STEAROYLCARN SERPL-SCNC: <0.01 UMOL/L
3OH-TDECANOYLCARN SERPL-SCNC: 0.01 UMOL/L
3OH-TDECENOYLCARN SERPL-SCNC: 0.01 UMOL/L
ACETYLCARN SERPL-SCNC: 8.02 UMOL/L (ref 2.93–15.06)
ACYLCARNITINE PATTERN SERPL-IMP: NORMAL
BUTYRYLCARN SERPL-SCNC: 0.3 UMOL/L
CARN ESTERS SERPL-SCNC: 16 UMOL/L (ref 5–29)
CARN ESTERS/C0 SERPL-SRTO: 0.4 RATIO (ref 0.1–1)
CARNITINE FREE SERPL-SCNC: 42 UMOL/L (ref 25–60)
CARNITINE SERPL-SCNC: 58 UMOL/L (ref 34–86)
DECANOYLCARN SERPL-SCNC: 0.11 UMOL/L
DECENOYLCARN SERPL-SCNC: 0.12 UMOL/L
DODECANOYLCARN SERPL-SCNC: 0.06 UMOL/L
DODECENOYLCARN SERPL-SCNC: 0.05 UMOL/L
GLUTARYLCARN SERPL-SCNC: 0.07 UMOL/L
HEXANOYLCARN SERPL-SCNC: 0.07 UMOL/L
ISOVALERYL+MEBUTYRYLCARN SERPL-SCNC: 0.13 UMOL/L
LINOLEOYLCARN SERPL-SCNC: 0.05 UMOL/L
OCTANOYLCARN SERPL-SCNC: 0.07 UMOL/L
OCTENOYLCARN SERPL-SCNC: 0.3 UMOL/L
OLEOYLCARN SERPL-SCNC: 0.09 UMOL/L
PALMITOLEYLCARN SERPL-SCNC: 0.01 UMOL/L
PALMITOYLCARN SERPL-SCNC: 0.08 UMOL/L
PROPIONYLCARN SERPL-SCNC: 0.61 UMOL/L
STEAROYLCARN SERPL-SCNC: 0.04 UMOL/L
TDECADIENOYLCARN SERPL-SCNC: 0.02 UMOL/L
TDECANOYLCARN SERPL-SCNC: 0.02 UMOL/L
TDECENOYLCARN SERPL-SCNC: 0.04 UMOL/L

## 2021-08-20 ENCOUNTER — APPOINTMENT (OUTPATIENT)
Dept: MEDICAL GROUP | Facility: IMAGING CENTER | Age: 31
End: 2021-08-20

## 2021-08-23 ENCOUNTER — OFFICE VISIT (OUTPATIENT)
Dept: MEDICAL GROUP | Facility: PHYSICIAN GROUP | Age: 31
End: 2021-08-23
Payer: COMMERCIAL

## 2021-08-23 VITALS
HEART RATE: 81 BPM | DIASTOLIC BLOOD PRESSURE: 70 MMHG | BODY MASS INDEX: 25.47 KG/M2 | SYSTOLIC BLOOD PRESSURE: 104 MMHG | OXYGEN SATURATION: 96 % | RESPIRATION RATE: 16 BRPM | HEIGHT: 72 IN | WEIGHT: 188 LBS | TEMPERATURE: 98.6 F

## 2021-08-23 DIAGNOSIS — R74.8 ELEVATED CPK: ICD-10-CM

## 2021-08-23 DIAGNOSIS — R73.03 PREDIABETES: ICD-10-CM

## 2021-08-23 DIAGNOSIS — J30.1 SEASONAL ALLERGIC RHINITIS DUE TO POLLEN: ICD-10-CM

## 2021-08-23 DIAGNOSIS — Z13.6 SCREENING FOR CARDIOVASCULAR CONDITION: ICD-10-CM

## 2021-08-23 DIAGNOSIS — R79.89 ELEVATED FERRITIN LEVEL: Chronic | ICD-10-CM

## 2021-08-23 PROCEDURE — 99214 OFFICE O/P EST MOD 30 MIN: CPT | Performed by: INTERNAL MEDICINE

## 2021-08-23 ASSESSMENT — FIBROSIS 4 INDEX: FIB4 SCORE: 0.55

## 2021-08-24 NOTE — PROGRESS NOTES
CC: EKG per hematology  Recent lab test result      HPI: This is a 31 y.o. pt.  Pt's medical history is notable for:    History is obtained through  service as the patient only speaks Mandarin     Prediabetes  This was noted with previous lab test.  Advised the patient regarding diet and regular exercise.    Seasonal allergic rhinitis due to pollen  Chronic condition per the patient currently taking Zyrtec.  Stable.  The patient denies fever chills shortness of breath or wheezing.    Elevated ferritin level  Chronic condition.  The patient presently followed by hematologist.  No new symptoms reported.    The patient stated that hematology recommend EKG to be done.  Patient denies chest pain shortness of breath palpitation or near syncope.    Patient reported that his mother was diagnosed with arrhythmia.  Details unclear    Elevated CPK  Recent lab test show elevated CPK above 400s.  EKG today unremarkable.    Repeat CPK with fractionation ordered.      Results for FLORENTINO MORGAN (MRN [id number]) as of 8/23/2021 17:20   Ref. Range 6/11/2021 14:02   Sodium Latest Ref Range: 135 - 145 mmol/L 137   Potassium Latest Ref Range: 3.6 - 5.5 mmol/L 4.0   Chloride Latest Ref Range: 96 - 112 mmol/L 101   Co2 Latest Ref Range: 20 - 33 mmol/L 22   Anion Gap Latest Ref Range: 7.0 - 16.0  14.0   Glucose Latest Ref Range: 65 - 99 mg/dL 103 (H)   Bun Latest Ref Range: 8 - 22 mg/dL 19   Creatinine Latest Ref Range: 0.50 - 1.40 mg/dL 0.98   GFR If  Latest Ref Range: >60 mL/min/1.73 m 2 >60   GFR If Non  Latest Ref Range: >60 mL/min/1.73 m 2 >60   Calcium Latest Ref Range: 8.5 - 10.5 mg/dL 9.9   AST(SGOT) Latest Ref Range: 12 - 45 U/L 36   ALT(SGPT) Latest Ref Range: 2 - 50 U/L 51 (H)   Alkaline Phosphatase Latest Ref Range: 30 - 99 U/L 45   Total Bilirubin Latest Ref Range: 0.1 - 1.5 mg/dL 0.4   Albumin Latest Ref Range: 3.2 - 4.9 g/dL 4.7   Total Protein Latest Ref Range: 6.0 - 8.2 g/dL 7.8    Globulin Latest Ref Range: 1.9 - 3.5 g/dL 3.1   A-G Ratio Latest Units: g/dL 1.5   CPK Total Latest Ref Range: 0 - 154 U/L 442 (H)   Iron Latest Ref Range: 50 - 180 ug/dL 98   Total Iron Binding Latest Ref Range: 250 - 450 ug/dL 365   % Saturation Latest Ref Range: 15 - 55 % 27   Unsat Iron Binding Latest Ref Range: 110 - 370 ug/dL 267   Glycohemoglobin Latest Ref Range: 4.0 - 5.6 % 5.9 (H)   Estim. Avg Glu Latest Units: mg/dL 123       REVIEW OF SYSTEMS:     Constitutional:  no fever / chills   Neurologic: no headaches  Eyes: no changes in vision  ENT: no sore throat, no hearing loss  CV:  no chest pain, no palpitations  Pulmonary: no SOB, no cough          Allergies: Patient has no known allergies.    Current Outpatient Medications Ordered in Epic   Medication Sig Dispense Refill   • cetirizine (ZYRTEC) 10 MG Tab Take 10 mg by mouth every day.       No current [facility]-ordered facility-administered medications on file.       Past Medical, Social, and Family history reviewed and updated in EPIC     ------------------------------------------------------------------------------     PHYSICAL EXAM:   Vitals:    08/23/21 1647   BP: 104/70   Pulse: 81   Resp: 16   Temp: 37 °C (98.6 °F)   SpO2: 96%        Vitals:    08/23/21 1647   BP: 104/70   Weight: 85.3 kg (188 lb)   Height: 1.829 m (6')         Body mass index is 25.5 kg/m².    Constitutional: no acute distress  CV: heart RRR  Resp: normal effort, no wheezing or rales.  GI: abdomen soft, no obvious mass, no tenderness  Neuro: CN 2-12 grossly intact    EKG today reveals sinus rhythm rate 69 bpm.  ST changes, probable normal early repolarization.  Normal EKG  -----------------------------------------------------------------------------    ASSESSMENT:   1.  Elevated CPK level.  Cause unclear needing further investigation  2 allergic rhinitis.  3.  Elevated ferritin level  4.  Prediabetes      MEDICAL DECISION MAKING: DISCUSSION / STATUS / PLAN:    Patient was informed  that EKG obtained today, unremarkable.  Advised the patient regarding low-carb low sweet diet.  Recommend for the patient to return for follow-up blood test approximately 6 months from the last blood test.  Patient will continue to take the Zyrtec for the allergy conditions.  Continue follow-up with hematologist regarding the elevated ferritin level.    Regarding the elevated CPK>> patient currently not take any medication such as statin.  Recommend patient to repeat CPK /fractionation.  Sed rate and UA also ordered.    Recent creatinine and liver enzymes are normal.  TSH completed June 11, 2021 also normal..  Patient currently asymptomatic.  Advised the patient to follow-up after lab test is done  If the CPK level is still elevated and no obvious cause identified consider referring the patient to have EMG done with possible neurology consult .  Once again advised the patient to follow-up after lab test completed.    -If any problems should arise, patient was advised to contact our office or go to ER to be evaluated.    Please note that this dictation was created using voice recognition software. I have made every reasonable attempt to correct obvious errors, but it is possible there are errors of grammar and possibly content that I did not discover before finalizing the note.

## 2021-08-24 NOTE — ASSESSMENT & PLAN NOTE
"HPI     Chief Complaint   Patient presents with   • Groin Pain       Right sided groin and leg pain x 1 day. Radiates to right anterior thigh. This has happened 4-5 times in the past 3 years. Denies recent trauma. Constant moderate pain. PT also notes small \"hair bump\" to right pubic area. States this has been there for several days. Denies fever, chills, abdominal pain, nausea, vomiting, dysuria or vaginal discharge             Patient History     History reviewed. No pertinent past medical history.    History reviewed. No pertinent surgical history.    History reviewed. No pertinent family history.    Social History   Substance Use Topics   • Smoking status: Never Smoker   • Smokeless tobacco: Never Used   • Alcohol use No       Systems Reviewed from Nursing Triage:          Review of Systems     Review of Systems   Constitutional: Negative for chills and fever.   HENT: Negative for ear pain and sore throat.    Eyes: Negative for pain and visual disturbance.   Respiratory: Negative for cough and shortness of breath.    Cardiovascular: Negative for chest pain and palpitations.   Gastrointestinal: Negative for abdominal pain and vomiting.   Genitourinary: Negative for dysuria and hematuria.   Musculoskeletal: Negative for arthralgias and back pain.   Skin: Negative for color change and rash.   Neurological: Negative for seizures and syncope.   All other systems reviewed and are negative.       Physical Exam     ED Triage Vitals [06/03/18 1532]   Temp Heart Rate Resp BP SpO2   36.9 °C (98.5 °F) 87 17 108/69 99 %      Temp Source Heart Rate Source Patient Position BP Location FiO2 (%) (Set)   Oral -- -- -- --                     Patient Vitals for the past 24 hrs:   BP Temp Temp src Pulse Resp SpO2 Height Weight   06/03/18 1532 108/69 36.9 °C (98.5 °F) Oral 87 17 99 % 1.6 m (5' 3\") 61.2 kg (135 lb)           Physical Exam   Constitutional: She appears well-developed and well-nourished. No distress.   HENT:   Head: " Recent lab test show elevated CPK above 400s.  EKG today unremarkable.      Recommend CPK with fractionation to be done along with UA and sed rate.  Advised the patient follow-up after lab test done   Normocephalic and atraumatic.   Eyes: Conjunctivae are normal.   Neck: Neck supple.   Cardiovascular: Normal rate and regular rhythm.    No murmur heard.  Pulmonary/Chest: Effort normal and breath sounds normal. No respiratory distress.   Abdominal: Soft. There is no tenderness.   Musculoskeletal: She exhibits no edema.   Neurological: She is alert.   Skin: Skin is warm and dry.   Right pubic area with 1cm area of induration. No active drainage. No right inguinal lymphadenopathy palpated but pt has inguinal tenderness.   Psychiatric: She has a normal mood and affect.   Nursing note and vitals reviewed.           Procedures    ED Course & MDM     Labs Reviewed - No data to display    No orders to display           MDM         ED Course          Clinical Impressions as of Jun 03 1856   Abscess     Disposition:       KARTHIK Palacios  06/03/18 2042

## 2021-08-24 NOTE — ASSESSMENT & PLAN NOTE
Chronic condition per the patient currently taking Zyrtec.  Stable.  The patient denies fever chills shortness of breath or wheezing.

## 2021-08-24 NOTE — ASSESSMENT & PLAN NOTE
Chronic condition.  The patient presently followed by hematologist.  No new symptoms reported.    The patient stated that hematology recommend EKG to be done.  Patient denies chest pain shortness of breath palpitation or near syncope.    Patient reported that his mother was diagnosed with arrhythmia.  Details unclear

## 2021-08-25 DIAGNOSIS — G47.9 SLEEP DISORDER: ICD-10-CM

## 2021-09-08 ENCOUNTER — HOSPITAL ENCOUNTER (OUTPATIENT)
Dept: LAB | Facility: MEDICAL CENTER | Age: 31
End: 2021-09-08
Attending: INTERNAL MEDICINE
Payer: COMMERCIAL

## 2021-09-08 DIAGNOSIS — R74.8 ELEVATED CPK: ICD-10-CM

## 2021-09-08 DIAGNOSIS — R74.8 ABNORMAL CPK: ICD-10-CM

## 2021-09-08 LAB
APPEARANCE UR: CLEAR
BILIRUB UR QL STRIP.AUTO: NEGATIVE
CK SERPL-CCNC: 302 U/L (ref 0–154)
COLOR UR: YELLOW
ERYTHROCYTE [SEDIMENTATION RATE] IN BLOOD BY WESTERGREN METHOD: 4 MM/HOUR (ref 0–20)
GLUCOSE UR STRIP.AUTO-MCNC: NEGATIVE MG/DL
KETONES UR STRIP.AUTO-MCNC: NEGATIVE MG/DL
LEUKOCYTE ESTERASE UR QL STRIP.AUTO: NEGATIVE
MICRO URNS: NORMAL
NITRITE UR QL STRIP.AUTO: NEGATIVE
PH UR STRIP.AUTO: 5.5 [PH] (ref 5–8)
PROT UR QL STRIP: NEGATIVE MG/DL
RBC UR QL AUTO: NEGATIVE
SP GR UR STRIP.AUTO: 1.02
UROBILINOGEN UR STRIP.AUTO-MCNC: 0.2 MG/DL

## 2021-09-08 PROCEDURE — 82550 ASSAY OF CK (CPK): CPT

## 2021-09-08 PROCEDURE — 81003 URINALYSIS AUTO W/O SCOPE: CPT

## 2021-09-08 PROCEDURE — 85652 RBC SED RATE AUTOMATED: CPT

## 2021-09-08 PROCEDURE — 82552 ASSAY OF CPK IN BLOOD: CPT

## 2021-09-08 PROCEDURE — 36415 COLL VENOUS BLD VENIPUNCTURE: CPT

## 2021-09-09 ENCOUNTER — OFFICE VISIT (OUTPATIENT)
Dept: CARDIOLOGY | Facility: MEDICAL CENTER | Age: 31
End: 2021-09-09
Payer: COMMERCIAL

## 2021-09-09 VITALS
RESPIRATION RATE: 14 BRPM | WEIGHT: 191.6 LBS | OXYGEN SATURATION: 98 % | BODY MASS INDEX: 25.95 KG/M2 | DIASTOLIC BLOOD PRESSURE: 72 MMHG | HEIGHT: 72 IN | HEART RATE: 76 BPM | SYSTOLIC BLOOD PRESSURE: 102 MMHG

## 2021-09-09 DIAGNOSIS — R74.8 ELEVATED CPK: ICD-10-CM

## 2021-09-09 DIAGNOSIS — R00.2 PALPITATIONS: ICD-10-CM

## 2021-09-09 PROCEDURE — 99244 OFF/OP CNSLTJ NEW/EST MOD 40: CPT | Performed by: INTERNAL MEDICINE

## 2021-09-09 RX ORDER — CHLORAL HYDRATE 500 MG
1000 CAPSULE ORAL
COMMUNITY
End: 2021-09-28

## 2021-09-09 ASSESSMENT — ENCOUNTER SYMPTOMS
BACK PAIN: 1
DIARRHEA: 1
SHORTNESS OF BREATH: 0
FEVER: 0
BLURRED VISION: 0
CHILLS: 0
ABDOMINAL PAIN: 0
BRUISES/BLEEDS EASILY: 0
DIZZINESS: 0
COUGH: 0
CLAUDICATION: 0
PND: 0
SORE THROAT: 0
FALLS: 0
NAUSEA: 0
WEAKNESS: 0
PALPITATIONS: 0
FOCAL WEAKNESS: 0

## 2021-09-09 ASSESSMENT — FIBROSIS 4 INDEX: FIB4 SCORE: 0.55

## 2021-09-09 NOTE — PROGRESS NOTES
Chief Complaint   Patient presents with   • Palpitations     Dx: Palpitations       Subjective     Demetrius Christie is a 31 y.o. male who presents today in consultation from Johnathon Patel M.D. and Robinson Atkinson for palpitations in the setting of anemia evaluation.  The history is obtained with the help with a Mandarin video  his wife also accompanies and is able to contribute to the history in English.  He describes long history of palpitations at very young he is unsure of the work-up in [place] but there was a concern possible hole in the heart is other people that they not have had that issue he has had intermittent palpitations nothing in the recent history does not know the particular trigger they are often seconds to up to 10 minutes but he has associated symptoms of shortness of breath sometimes dizziness.    Past Medical History:   Diagnosis Date   • Allergy to pollen 2014   • Anemia 11/13/2020    Hg 12 Ordered ffib, Hg electro   • Bleeding nose 1999   • Heart palpitations 1998   • Wrist fracture, bilateral      History reviewed. No pertinent surgical history.  Family History   Problem Relation Age of Onset   • Arrythmia Mother         had ablation procedure 2017     Social History     Socioeconomic History   • Marital status:      Spouse name: Not on file   • Number of children: Not on file   • Years of education: Not on file   • Highest education level: Not on file   Occupational History   • Not on file   Tobacco Use   • Smoking status: Never Smoker   • Smokeless tobacco: Never Used   Vaping Use   • Vaping Use: Never used   Substance and Sexual Activity   • Alcohol use: Yes     Comment: 1 - 2 beers daily    • Drug use: No   • Sexual activity: Yes     Partners: Female   Other Topics Concern   • Not on file   Social History Narrative   • Not on file     Social Determinants of Health     Financial Resource Strain:    • Difficulty of Paying Living Expenses:    Food Insecurity:    •  Worried About Running Out of Food in the Last Year:    • Ran Out of Food in the Last Year:    Transportation Needs:    • Lack of Transportation (Medical):    • Lack of Transportation (Non-Medical):    Physical Activity:    • Days of Exercise per Week:    • Minutes of Exercise per Session:    Stress:    • Feeling of Stress :    Social Connections:    • Frequency of Communication with Friends and Family:    • Frequency of Social Gatherings with Friends and Family:    • Attends Worship Services:    • Active Member of Clubs or Organizations:    • Attends Club or Organization Meetings:    • Marital Status:    Intimate Partner Violence:    • Fear of Current or Ex-Partner:    • Emotionally Abused:    • Physically Abused:    • Sexually Abused:      No Known Allergies  Outpatient Encounter Medications as of 9/9/2021   Medication Sig Dispense Refill   • Omega-3 Fatty Acids (FISH OIL) 1000 MG Cap capsule Take 1,000 mg by mouth 3 times a day with meals.     • CINNAMON PO Take  by mouth.     • cetirizine (ZYRTEC) 10 MG Tab Take 10 mg by mouth every day.       No facility-administered encounter medications on file as of 9/9/2021.     Review of Systems   Constitutional: Negative for chills and fever.   HENT: Negative for sore throat.    Eyes: Negative for blurred vision.   Respiratory: Negative for cough and shortness of breath.    Cardiovascular: Negative for chest pain, palpitations, claudication, leg swelling and PND.   Gastrointestinal: Positive for diarrhea. Negative for abdominal pain and nausea.   Musculoskeletal: Positive for back pain and joint pain. Negative for falls.   Skin: Negative for rash.   Neurological: Negative for dizziness, focal weakness and weakness.   Endo/Heme/Allergies: Positive for environmental allergies. Does not bruise/bleed easily.              Objective     /72 (BP Location: Left arm, Patient Position: Sitting, BP Cuff Size: Adult)   Pulse 76   Resp 14   Ht 1.829 m (6')   Wt 86.9 kg (191  lb 9.6 oz)   SpO2 98%   BMI 25.99 kg/m²     Physical Exam  Constitutional:       General: He is not in acute distress.     Appearance: He is not diaphoretic.   Eyes:      General: No scleral icterus.  Neck:      Vascular: No JVD.   Cardiovascular:      Rate and Rhythm: Normal rate.      Heart sounds: Normal heart sounds. No murmur heard.   No friction rub. No gallop.    Pulmonary:      Effort: No respiratory distress.      Breath sounds: No wheezing or rales.   Abdominal:      General: Bowel sounds are normal.      Palpations: Abdomen is soft.   Skin:     Findings: No rash.   Neurological:      Mental Status: He is alert.            We reviewed in person the most recent labs  Recent Results (from the past 5040 hour(s))   BRYANT REFLEXIVE PROFILE    Collection Time: 03/10/21 10:01 AM   Result Value Ref Range    Antinuclear Antibody None Detected None Detected   TSH    Collection Time: 03/10/21 10:01 AM   Result Value Ref Range    TSH 1.110 0.380 - 5.330 uIU/mL   CRP HIGH SENSITIVE (CARDIAC)    Collection Time: 03/10/21 10:01 AM   Result Value Ref Range    C Reactive Protein High Sensitive 0.7 0.0 - 7.5 mg/L   FACTOR VIII    Collection Time: 03/10/21 10:01 AM   Result Value Ref Range    Factor VIII 50.0 45.0 - 145.0 %    Inhibitor Indicated No    LUPUS ANTICOAGULANT    Collection Time: 03/10/21 10:01 AM   Result Value Ref Range    PT 11.4 (L) 12.0 - 14.6 sec    INR 0.81 (L) 0.87 - 1.13    APTT 33.0 24.7 - 36.0 sec    Heparin Anti-Xa <0.1 U/mL    Dilute Rick Viper Venom Johnathon 34.4 28.0 - 48.0 sec    Lupus Anticoagulant Not Present    Ova & Parasite Exam, Fecal    Collection Time: 03/15/21  9:16 AM   Result Value Ref Range    Ova and Parasite, Fecal Interpretation Negative Negative   TSH    Collection Time: 06/11/21  2:02 PM   Result Value Ref Range    TSH 1.720 0.380 - 5.330 uIU/mL   HEMOGLOBIN A1C    Collection Time: 06/11/21  2:02 PM   Result Value Ref Range    Glycohemoglobin 5.9 (H) 4.0 - 5.6 %    Est Avg Glucose  123 mg/dL   TESTOSTERONE SERUM    Collection Time: 06/11/21  2:02 PM   Result Value Ref Range    Testosterone,Total 138 (L) 175 - 781 ng/dL   CBC WITH DIFFERENTIAL    Collection Time: 06/11/21  2:02 PM   Result Value Ref Range    WBC 6.1 4.8 - 10.8 K/uL    RBC 4.99 4.70 - 6.10 M/uL    Hemoglobin 13.8 (L) 14.0 - 18.0 g/dL    Hematocrit 44.2 42.0 - 52.0 %    MCV 88.6 81.4 - 97.8 fL    MCH 27.7 27.0 - 33.0 pg    MCHC 31.2 (L) 33.7 - 35.3 g/dL    RDW 43.6 35.9 - 50.0 fL    Platelet Count 284 164 - 446 K/uL    MPV 9.6 9.0 - 12.9 fL    Neutrophils-Polys 51.90 44.00 - 72.00 %    Lymphocytes 36.80 22.00 - 41.00 %    Monocytes 8.20 0.00 - 13.40 %    Eosinophils 2.30 0.00 - 6.90 %    Basophils 0.50 0.00 - 1.80 %    Immature Granulocytes 0.30 0.00 - 0.90 %    Nucleated RBC 0.00 /100 WBC    Neutrophils (Absolute) 3.16 1.82 - 7.42 K/uL    Lymphs (Absolute) 2.24 1.00 - 4.80 K/uL    Monos (Absolute) 0.50 0.00 - 0.85 K/uL    Eos (Absolute) 0.14 0.00 - 0.51 K/uL    Baso (Absolute) 0.03 0.00 - 0.12 K/uL    Immature Granulocytes (abs) 0.02 0.00 - 0.11 K/uL    NRBC (Absolute) 0.00 K/uL   IRON/TOTAL IRON BIND    Collection Time: 06/11/21  2:02 PM   Result Value Ref Range    Iron 98 50 - 180 ug/dL    Total Iron Binding 365 250 - 450 ug/dL    Unsat Iron Binding 267 110 - 370 ug/dL    % Saturation 27 15 - 55 %   FERRITIN    Collection Time: 06/11/21  2:02 PM   Result Value Ref Range    Ferritin 510.0 (H) 22.0 - 322.0 ng/mL   CREATINE KINASE    Collection Time: 06/11/21  2:02 PM   Result Value Ref Range    CPK Total 442 (H) 0 - 154 U/L   Comp Metabolic Panel    Collection Time: 06/11/21  2:02 PM   Result Value Ref Range    Sodium 137 135 - 145 mmol/L    Potassium 4.0 3.6 - 5.5 mmol/L    Chloride 101 96 - 112 mmol/L    Co2 22 20 - 33 mmol/L    Anion Gap 14.0 7.0 - 16.0    Glucose 103 (H) 65 - 99 mg/dL    Bun 19 8 - 22 mg/dL    Creatinine 0.98 0.50 - 1.40 mg/dL    Calcium 9.9 8.5 - 10.5 mg/dL    AST(SGOT) 36 12 - 45 U/L    ALT(SGPT) 51 (H) 2  - 50 U/L    Alkaline Phosphatase 45 30 - 99 U/L    Total Bilirubin 0.4 0.1 - 1.5 mg/dL    Albumin 4.7 3.2 - 4.9 g/dL    Total Protein 7.8 6.0 - 8.2 g/dL    Globulin 3.1 1.9 - 3.5 g/dL    A-G Ratio 1.5 g/dL   CRP QUANTITIVE (NON-CARDIAC)    Collection Time: 06/11/21  2:02 PM   Result Value Ref Range    Stat C-Reactive Protein <0.30 0.00 - 0.75 mg/dL   ESTIMATED GFR    Collection Time: 06/11/21  2:02 PM   Result Value Ref Range    GFR If African American >60 >60 mL/min/1.73 m 2    GFR If Non African American >60 >60 mL/min/1.73 m 2   URIC ACID    Collection Time: 07/17/21 11:17 AM   Result Value Ref Range    Uric Acid 7.8 2.5 - 8.3 mg/dL   PHOSPHORUS    Collection Time: 07/17/21 11:17 AM   Result Value Ref Range    Phosphorus 3.8 2.5 - 4.5 mg/dL   MYOGLOBIN    Collection Time: 07/17/21 11:17 AM   Result Value Ref Range    Myoglobin, Serum 22 0 - 110 ng/mL   URINALYSIS    Collection Time: 07/17/21 11:17 AM    Specimen: Urine   Result Value Ref Range    Color Yellow     Character Clear     Specific Gravity 1.016 <1.035    Ph 6.0 5.0 - 8.0    Glucose Negative Negative mg/dL    Ketones Negative Negative mg/dL    Protein Negative Negative mg/dL    Bilirubin Negative Negative    Urobilinogen, Urine 0.2 Negative    Nitrite Negative Negative    Leukocyte Esterase Negative Negative    Occult Blood Negative Negative    Micro Urine Req see below    FERRITIN    Collection Time: 07/17/21 11:17 AM   Result Value Ref Range    Ferritin 445.0 (H) 22.0 - 322.0 ng/mL   CBC WITH DIFFERENTIAL    Collection Time: 07/17/21 11:17 AM   Result Value Ref Range    WBC 4.7 (L) 4.8 - 10.8 K/uL    RBC 5.11 4.70 - 6.10 M/uL    Hemoglobin 14.4 14.0 - 18.0 g/dL    Hematocrit 44.5 42.0 - 52.0 %    MCV 87.1 81.4 - 97.8 fL    MCH 28.2 27.0 - 33.0 pg    MCHC 32.4 (L) 33.7 - 35.3 g/dL    RDW 40.6 35.9 - 50.0 fL    Platelet Count 274 164 - 446 K/uL    MPV 9.4 9.0 - 12.9 fL    Neutrophils-Polys 38.80 (L) 44.00 - 72.00 %    Lymphocytes 47.90 (H) 22.00 - 41.00  %    Monocytes 8.60 0.00 - 13.40 %    Eosinophils 3.20 0.00 - 6.90 %    Basophils 1.30 0.00 - 1.80 %    Immature Granulocytes 0.20 0.00 - 0.90 %    Nucleated RBC 0.00 /100 WBC    Neutrophils (Absolute) 1.81 (L) 1.82 - 7.42 K/uL    Lymphs (Absolute) 2.23 1.00 - 4.80 K/uL    Monos (Absolute) 0.40 0.00 - 0.85 K/uL    Eos (Absolute) 0.15 0.00 - 0.51 K/uL    Baso (Absolute) 0.06 0.00 - 0.12 K/uL    Immature Granulocytes (abs) 0.01 0.00 - 0.11 K/uL    NRBC (Absolute) 0.00 K/uL   CARNITINE PANEL    Collection Time: 07/17/21 11:18 AM   Result Value Ref Range    C2 Acetyl 8.02 2.93 - 15.06 umol/L    C3 Propionyl 0.61 <=0.82 umol/L    C4 Iso-/Butyryl 0.30 <=0.42 umol/L    C5, Isovaleryl, 2 Mebutryrl 0.13 <=0.24 umol/L    C5-DC Glutaryl 0.07 <=0.23 umol/L    C5-OH,3-OH Isovaleryl 0.04 <=0.07 umol/L    C6 Hexanoyl 0.07 <=0.12 umol/L    C8 Octanoyl 0.07 <=0.22 umol/L    C8:1 Octenoyl 0.30 <=0.60 umol/L    C10 Decanoyl 0.11 <=0.33 umol/L    C10:1 Decenoyl 0.12 <=0.27 umol/L    C12 Dodecanoyl 0.06 <=0.13 umol/L    C12:1 Dodecenoyl 0.05 <=0.13 umol/L    C12-OH,3-OH Dodecanoyl <0.01 <=0.02 umol/L    C14 Tetradecanoyl 0.02 <=0.06 umol/L    C14:1 Tetradecenoyl 0.04 <=0.15 umol/L    C14:2 Tetradecadienoyl 0.02 <=0.08 umol/L    C14-OH,3-OH Tetradecanoyl 0.01 <=0.01 umol/L    C14:1-OH,3-OH Tetradecenoyl 0.01 <=0.04 umol/L    C16 Palmitoyl 0.08 <=0.12 umol/L    C16:1 Palmitoleyl 0.01 <=0.04 umol/L    C16-OH,3-OH Palmitoleyl 0.01 <=0.02 umol/L    C16:1-OH,3-OH-Palmitoleyl 0.01 <=0.02 umol/L    C18 Stearoyl 0.04 <=0.06 umol/L    C18:1 Oleoyl 0.09 <=0.18 umol/L    C18:2 Linoleyl 0.05 <=0.10 umol/L    C18-OH,3-OH Stearoyl <0.01 <=0.02 umol/L    C18:1-OH, 3-OH-Oleyl 0.01 <=0.02 umol/L    C18:2-OH, 3-OH-Linoleyl <0.01 <=0.02 umol/L    Carnitine, Free 42 25 - 60 umol/L    Carnitine, Esterified 16 5 - 29 umol/L    Carnitine, Total 58 34 - 86 umol/L    Carnitine Esterified / Free (Ratio) 0.4 0.1 - 1.0 ratio    Acylcarnitine Plasma Interp Normal     URINALYSIS    Collection Time: 09/08/21  6:32 AM    Specimen: Urine   Result Value Ref Range    Color Yellow     Character Clear     Specific Gravity 1.021 <1.035    Ph 5.5 5.0 - 8.0    Glucose Negative Negative mg/dL    Ketones Negative Negative mg/dL    Protein Negative Negative mg/dL    Bilirubin Negative Negative    Urobilinogen, Urine 0.2 Negative    Nitrite Negative Negative    Leukocyte Esterase Negative Negative    Occult Blood Negative Negative    Micro Urine Req see below    Sed Rate    Collection Time: 09/08/21  6:32 AM   Result Value Ref Range    Sed Rate Westergren 4 0 - 20 mm/hour   CREATINE KINASE    Collection Time: 09/08/21  6:32 AM   Result Value Ref Range    CPK Total 302 (H) 0 - 154 U/L         Assessment & Plan     1. Elevated CPK  EC-ECHOCARDIOGRAM COMPLETE W/O CONT    TROPONIN   2. Palpitations  EC-ECHOCARDIOGRAM COMPLETE W/O CONT    TROPONIN       Medical Decision Making: Today's Assessment/Status/Plan:        It was my pleasure to meet with Mr. Christie.    We will get an echocardiogram to rule out an underlying cardiomyopathy and a troponin test with his next labs    We discussed getting an event monitor but his palpitations occur so rarely that they will get a Kardia device    It is my pleasure to participate in the care of Mr. Christie.  Please do not hesitate to contact me with questions or concerns.    Reece Rodriguez MD PhD FAC  Cardiologist [facility]    Please note that this dictation was created using voice recognition software. There may be errors I did not discover before finalizing the note.     9/9/2021  6:21 PM

## 2021-09-12 DIAGNOSIS — R74.8 ELEVATED CPK: ICD-10-CM

## 2021-09-12 LAB
CK BB CFR SERPL ELPH: 0 % (ref 0–0)
CK MACRO1 CFR SERPL: 0 % (ref 0–0)
CK MACRO2 CFR SERPL: 0 % (ref 0–0)
CK MB CFR SERPL ELPH: 0 % (ref 0–4)
CK MM CFR SERPL ELPH: 100 % (ref 96–100)
CK SERPL-CCNC: 303 U/L (ref 20–200)

## 2021-09-13 ENCOUNTER — PATIENT MESSAGE (OUTPATIENT)
Dept: CARDIOLOGY | Facility: MEDICAL CENTER | Age: 31
End: 2021-09-13

## 2021-09-14 ENCOUNTER — TELEPHONE (OUTPATIENT)
Dept: MEDICAL GROUP | Facility: PHYSICIAN GROUP | Age: 31
End: 2021-09-14

## 2021-09-14 DIAGNOSIS — R74.8 ELEVATED CPK: ICD-10-CM

## 2021-09-14 NOTE — TELEPHONE ENCOUNTER
Please notify patient about their results:    Recent blood test showed elevated muscle enzyme [CPK]. This was noted previously.  In addition, the isoenzyme showed 100% cpk MM >> suggesting that this is more likely related to skeletal muscle injury needing further evaluation/investigation.    Recommendation:     1. A referral has been submitted for EMG testing [Electromyography: special test that measures muscle response]     2. In addition, the initial referral submitted to Physiatry has been denied by Physiatrist [Dr Garcia] who rec pt be evaluated by Neurologist  Referral to Neurology submitted today

## 2021-09-15 NOTE — TELEPHONE ENCOUNTER
1st attempt to contact patient.  Left voicemail in regards to results.  Asked patient to return the call by contacting the office at [phone].

## 2021-09-28 ENCOUNTER — OFFICE VISIT (OUTPATIENT)
Dept: NEUROLOGY | Facility: MEDICAL CENTER | Age: 31
End: 2021-09-28
Attending: PSYCHIATRY & NEUROLOGY
Payer: COMMERCIAL

## 2021-09-28 VITALS
SYSTOLIC BLOOD PRESSURE: 116 MMHG | BODY MASS INDEX: 25.95 KG/M2 | HEIGHT: 73 IN | TEMPERATURE: 97.6 F | OXYGEN SATURATION: 95 % | WEIGHT: 195.77 LBS | DIASTOLIC BLOOD PRESSURE: 72 MMHG | HEART RATE: 86 BPM

## 2021-09-28 DIAGNOSIS — R74.8 ELEVATED CPK: ICD-10-CM

## 2021-09-28 PROCEDURE — 99211 OFF/OP EST MAY X REQ PHY/QHP: CPT | Performed by: PSYCHIATRY & NEUROLOGY

## 2021-09-28 PROCEDURE — 99204 OFFICE O/P NEW MOD 45 MIN: CPT | Performed by: PSYCHIATRY & NEUROLOGY

## 2021-09-28 ASSESSMENT — FIBROSIS 4 INDEX: FIB4 SCORE: 0.55

## 2021-09-28 NOTE — PROGRESS NOTES
Chief Complaint   Patient presents with   • New Patient     Elevated CPK       History of present illness:  KYLAH Christie 31 y.o. male with elevated CK of 442 3 months ago, repeated 2 weeks ago with 303. In 2020 he had a CK of 199.   He has had years of muscle soreness of his back. 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 There is no muscle weakness.   He occasionally exercises. When he exercises, he will run or go to the gym. He does not frequently exercise currently.   He stopped running because he feels that his breathing is strained.   There is no family history of muscle disorder.     Past medical history:   Past Medical History:   Diagnosis Date   • Allergy to pollen 2014   • Anemia 11/13/2020    Hg 12 Ordered ffib, Hg electro   • Bleeding nose 1999   • Heart palpitations 1998   • Wrist fracture, bilateral        Past surgical history:   History reviewed. No pertinent surgical history.    Family history:   Family History   Problem Relation Age of Onset   • Arrythmia Mother         had ablation procedure 2017       Social history:   Social History     Socioeconomic History   • Marital status:      Spouse name: Not on file   • Number of children: Not on file   • Years of education: Not on file   • Highest education level: Not on file   Occupational History   • Not on file   Tobacco Use   • Smoking status: Never Smoker   • Smokeless tobacco: Never Used   Vaping Use   • Vaping Use: Never used   Substance and Sexual Activity   • Alcohol use: Yes     Comment: 1 - 2 beers daily    • Drug use: No   • Sexual activity: Yes     Partners: Female   Other Topics Concern   • Not on file   Social History Narrative   • Not on file     Social Determinants of Health     Financial Resource Strain:    • Difficulty of Paying Living Expenses:    Food Insecurity:    • Worried About Running Out of Food in the Last Year:    • Ran Out of Food in the Last Year:    Transportation Needs:    • Lack of Transportation  "(Medical):    • Lack of Transportation (Non-Medical):    Physical Activity:    • Days of Exercise per Week:    • Minutes of Exercise per Session:    Stress:    • Feeling of Stress :    Social Connections:    • Frequency of Communication with Friends and Family:    • Frequency of Social Gatherings with Friends and Family:    • Attends [employer] Services:    • Active Member of Clubs or Organizations:    • Attends Club or Organization Meetings:    • Marital Status:    Intimate Partner Violence:    • Fear of Current or Ex-Partner:    • Emotionally Abused:    • Physically Abused:    • Sexually Abused:        Current medications:   Current Outpatient Medications   Medication   • Omega-3 Fatty Acids (FISH OIL) 1000 MG Cap capsule   • CINNAMON PO   • cetirizine (ZYRTEC) 10 MG Tab     No current facility-administered medications for this visit.       Medication Allergy:  No Known Allergies    Physical examination:   Vitals:    09/28/21 0712   BP: 116/72   BP Location: Left arm   Patient Position: Sitting   BP Cuff Size: Adult   Pulse: 86   Temp: 36.4 °C (97.6 °F)   TempSrc: Temporal   SpO2: 95%   Weight: 88.8 kg (195 lb 12.3 oz)   Height: 1.854 m (6' 1\")     Neurological Exam    Motor                                               Right                     Left   Shoulder abduction               5                          5   Shoulder adduction               5                          5  Elbow flexion                         5                          5  Elbow extension                    5                          5  Wrist extension                      5                          5  Hip abduction                         5                          5  Hip adduction                         5                          5  Knee flexion                           5                          5  Knee extension                      5                          5  Plantarflexion                         5                          " 5  Dorsiflexion                            5                          5    Reflexes                                           Right                      Left  Brachioradialis                    Tr                         Tr  Biceps                                                        Tr  Patellar                                1+                         1+    Gait  Casual gait is normal including stance, stride, and arm swing.  Normal toe walking. Normal heel walking.      Labs:  I reviewed the following labs personally:  Na+ 137  K+ 4.0  BUN: 19  Cr: 0.98  Phos: 3.8  TSH: 1.720    Imaging:   None     ASSESSMENT AND PLAN:  Problem List Items Addressed This Visit     Elevated CPK          1. Elevated CPK    Patient has mild elevation of CK of ~300 and is asymptomatic, and has a normal motor exam. I have counseled him that EMG is not indicated as suspicion is low for neuromuscular disorder and there is no family history of muscle disorder.   He will monitor for development of muscle weakness and will return PRN.     FOLLOW-UP:   Return if symptoms worsen or fail to improve.    Total time spent for the day for this patient unrelated to procedure time is: 37 minutes. I spent 18 minutes in face to face time and I spent 14 minutes pre-charting and 5 minutes in post-visit documentation.      Dr. Jeramy Amato D.O.  Former[facility], later [facility]   Movement Disorders Specialist

## 2021-10-04 ENCOUNTER — HOSPITAL ENCOUNTER (OUTPATIENT)
Dept: LAB | Facility: MEDICAL CENTER | Age: 31
End: 2021-10-04
Attending: NURSE PRACTITIONER
Payer: COMMERCIAL

## 2021-10-04 DIAGNOSIS — E83.118 OTHER HEMOCHROMATOSIS: ICD-10-CM

## 2021-10-04 LAB
BASOPHILS # BLD AUTO: 1 % (ref 0–1.8)
BASOPHILS # BLD: 0.06 K/UL (ref 0–0.12)
EOSINOPHIL # BLD AUTO: 0.22 K/UL (ref 0–0.51)
EOSINOPHIL NFR BLD: 3.5 % (ref 0–6.9)
ERYTHROCYTE [DISTWIDTH] IN BLOOD BY AUTOMATED COUNT: 42.9 FL (ref 35.9–50)
FERRITIN SERPL-MCNC: 439 NG/ML (ref 22–322)
HCT VFR BLD AUTO: 45.2 % (ref 42–52)
HGB BLD-MCNC: 14.3 G/DL (ref 14–18)
IMM GRANULOCYTES # BLD AUTO: 0.02 K/UL (ref 0–0.11)
IMM GRANULOCYTES NFR BLD AUTO: 0.3 % (ref 0–0.9)
LYMPHOCYTES # BLD AUTO: 2.66 K/UL (ref 1–4.8)
LYMPHOCYTES NFR BLD: 42.7 % (ref 22–41)
MCH RBC QN AUTO: 27.8 PG (ref 27–33)
MCHC RBC AUTO-ENTMCNC: 31.6 G/DL (ref 33.7–35.3)
MCV RBC AUTO: 87.9 FL (ref 81.4–97.8)
MONOCYTES # BLD AUTO: 0.36 K/UL (ref 0–0.85)
MONOCYTES NFR BLD AUTO: 5.8 % (ref 0–13.4)
NEUTROPHILS # BLD AUTO: 2.91 K/UL (ref 1.82–7.42)
NEUTROPHILS NFR BLD: 46.7 % (ref 44–72)
NRBC # BLD AUTO: 0 K/UL
NRBC BLD-RTO: 0 /100 WBC
PLATELET # BLD AUTO: 278 K/UL (ref 164–446)
PMV BLD AUTO: 9.3 FL (ref 9–12.9)
RBC # BLD AUTO: 5.14 M/UL (ref 4.7–6.1)
WBC # BLD AUTO: 6.2 K/UL (ref 4.8–10.8)

## 2021-10-04 PROCEDURE — 82728 ASSAY OF FERRITIN: CPT

## 2021-10-04 PROCEDURE — 85025 COMPLETE CBC W/AUTO DIFF WBC: CPT

## 2021-10-04 PROCEDURE — 36415 COLL VENOUS BLD VENIPUNCTURE: CPT

## 2021-10-12 ENCOUNTER — OFFICE VISIT (OUTPATIENT)
Dept: HEMATOLOGY ONCOLOGY | Facility: MEDICAL CENTER | Age: 31
End: 2021-10-12
Payer: COMMERCIAL

## 2021-10-12 ENCOUNTER — OFFICE VISIT (OUTPATIENT)
Dept: SLEEP MEDICINE | Facility: MEDICAL CENTER | Age: 31
End: 2021-10-12
Payer: COMMERCIAL

## 2021-10-12 VITALS
HEART RATE: 72 BPM | RESPIRATION RATE: 16 BRPM | WEIGHT: 191 LBS | DIASTOLIC BLOOD PRESSURE: 70 MMHG | SYSTOLIC BLOOD PRESSURE: 100 MMHG | HEIGHT: 72 IN | OXYGEN SATURATION: 97 % | BODY MASS INDEX: 25.87 KG/M2

## 2021-10-12 VITALS
DIASTOLIC BLOOD PRESSURE: 72 MMHG | HEART RATE: 102 BPM | OXYGEN SATURATION: 95 % | SYSTOLIC BLOOD PRESSURE: 110 MMHG | RESPIRATION RATE: 16 BRPM | BODY MASS INDEX: 25.61 KG/M2 | TEMPERATURE: 98.3 F | HEIGHT: 72 IN | WEIGHT: 189.04 LBS

## 2021-10-12 DIAGNOSIS — R79.89 ELEVATED FERRITIN LEVEL: ICD-10-CM

## 2021-10-12 DIAGNOSIS — R74.8 ELEVATED CPK: ICD-10-CM

## 2021-10-12 DIAGNOSIS — G47.30 SLEEP DISORDER BREATHING: Primary | ICD-10-CM

## 2021-10-12 DIAGNOSIS — Z09 ENCOUNTER FOR HEMATOLOGY FOLLOW-UP: ICD-10-CM

## 2021-10-12 PROCEDURE — 99203 OFFICE O/P NEW LOW 30 MIN: CPT | Performed by: STUDENT IN AN ORGANIZED HEALTH CARE EDUCATION/TRAINING PROGRAM

## 2021-10-12 PROCEDURE — 99214 OFFICE O/P EST MOD 30 MIN: CPT | Performed by: NURSE PRACTITIONER

## 2021-10-12 RX ORDER — CHLORAL HYDRATE 500 MG
1000 CAPSULE ORAL
COMMUNITY

## 2021-10-12 ASSESSMENT — ENCOUNTER SYMPTOMS
CHILLS: 0
WEIGHT LOSS: 0
CONSTIPATION: 0
HEADACHES: 0
SHORTNESS OF BREATH: 0
INSOMNIA: 0
NAUSEA: 0
WHEEZING: 0
DIZZINESS: 1
TINGLING: 0
PALPITATIONS: 1
MYALGIAS: 1
VOMITING: 0
FEVER: 0
COUGH: 1
DIARRHEA: 1
BLOOD IN STOOL: 0

## 2021-10-12 ASSESSMENT — FIBROSIS 4 INDEX
FIB4 SCORE: 0.54
FIB4 SCORE: 0.54

## 2021-10-12 NOTE — PROGRESS NOTES
[facility] Consult Note     Date: 10/12/2021 / Time: 8:06 AM      Thank you for requesting a sleep medicine consultation on KYLAH Christie at the sleep center. Presents today with the chief complaints of snoring,  occasional excessive daytime sleepiness. He is referred by Johnathon Patel M.D.  [address] [place] [address],  [place] [id number] for evaluation and treatment of sleep disorder breathing.     HISTORY OF PRESENT ILLNESS:     KYLAH Christie is a 31 y.o. male with seasonal allergies.  Presents to Sleep Clinic for evaluation of snoring and occasional sleepiness during the day.      present for today's visit gabby    He states he has been having issues with tiredness for driving for the last 4 to 5 years.  He does not have sleepiness while watching TV or on the computer.  He knows he snores in his sleep he does grind his teeth.  He will occasionally wake up with a headache and dry mouth.    He had his first child approximately 4 years ago and most recently had a new child.  With birth of his new baby his sleep has become more shallow and more interrupted.  He feels at times he will wake up to the slightest noise and be almost fully awake.    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  In the morning he drinks 3 cups of coffee.  He does not nap during the day.    [place] Sleepiness Score: 7    Sleep Schedule  Bedtime: Weekday & Weekend 9-10pm   Wake time: Weekday 430-530am Weekend 7am   Sleep-onset latency: 10-15 min   Awakenings from sleep: 0-2  Difficulty falling back asleep: sometimes   Bedroom partner: wife   Naps: No     DAYTIME SYMPTOMS:   Excessive daytime sleepiness: Yes  Daytime fatigue: No   Difficulty concentrating: Yes  Memory problems: Yes  Irritability:No   Work/school performance issues: Yes  Sleepiness with driving: Yes  Caffeine/stimulant use: Yes, 3 cups a coffee   Alcohol use:Yes, How Many?  "3-4 beers a week      SLEEP RELATED SYMPTOMS  Snoring: Yes  Witnessed apnea or gasping/choking: No   Dry mouth or mouth breathing: Yes  Sweating: No   Teeth grinding/biting: Yes  Morning headaches: Yes  Refreshed Upon Awakening: No      SLEEP RELATED BEHAVIORS:  Parasomnias (walking, talking, eating, violence): No   Leg kicking: No   Restless legs - \"urge to move\": No   Nightmares: No  Recurrent: No   Dream enactment: No      NARCOLEPSY:  Cataplexy: No   Sleep paralysis: No   Sleep attacks: No   Hypnagogic/hypnopompic hallucinations: No     MEDICAL HISTORY  Past Medical History:   Diagnosis Date   • Allergy to pollen 2014   • Anemia 11/13/2020    Hg 12 Ordered ffib, Hg electro   • Bleeding nose 1999   • Heart palpitations 1998   • Wrist fracture, bilateral         SURGICAL HISTORY  History reviewed. No pertinent surgical history.     FAMILY HISTORY  Family History   Problem Relation Age of Onset   • Arrythmia Mother         had ablation procedure 2017       SOCIAL HISTORY  Social History     Socioeconomic History   • Marital status:      Spouse name: Not on file   • Number of children: Not on file   • Years of education: Not on file   • Highest education level: Not on file   Occupational History   • Not on file   Tobacco Use   • Smoking status: Never Smoker   • Smokeless tobacco: Never Used   Vaping Use   • Vaping Use: Never used   Substance and Sexual Activity   • Alcohol use: Yes     Comment: 1 - 2 beers daily    • Drug use: No   • Sexual activity: Yes     Partners: Female   Other Topics Concern   • Not on file   Social History Narrative   • Not on file     Social Determinants of Health     Financial Resource Strain:    • Difficulty of Paying Living Expenses:    Food Insecurity:    • Worried About Running Out of Food in the Last Year:    • Ran Out of Food in the Last Year:    Transportation Needs:    • Lack of Transportation (Medical):    • Lack of Transportation (Non-Medical):    Physical Activity:    • " Days of Exercise per Week:    • Minutes of Exercise per Session:    Stress:    • Feeling of Stress :    Social Connections:    • Frequency of Communication with Friends and Family:    • Frequency of Social Gatherings with Friends and Family:    • Attends Religion Services:    • Active Member of Clubs or Organizations:    • Attends Club or Organization Meetings:    • Marital Status:    Intimate Partner Violence:    • Fear of Current or Ex-Partner:    • Emotionally Abused:    • Physically Abused:    • Sexually Abused:         Occupation: construction.     CURRENT MEDICATIONS  Current Outpatient Medications   Medication Sig Dispense Refill   • cetirizine (ZYRTEC) 10 MG Tab Take 10 mg by mouth every day.     • CINNAMON PO Take  by mouth. (Patient not taking: Reported on 9/28/2021)       No current facility-administered medications for this visit.       REVIEW OF SYSTEMS  Constitutional: Denies fevers, Denies weight changes  Eyes: Denies changes in vision, no eye pain  Ears/Nose/Throat/Mouth: Denies nasal congestion or sore throat   Cardiovascular: Denies chest pain or palpitations   Respiratory: Denies shortness of breath, Denies cough  Gastrointestinal/Hepatic: Denies abdominal pain, nausea, vomiting  Musculoskeletal/Rheum: Denies  joint pain and swelling   Skin/Breast: Denies rash  Neurological: Denies headache, confusion, memory loss or focal weakness/parasthesias  Psychiatric: denies mood disorder     Comprehensive review of systems form is reviewed with the patient and is attached in the EMR    Physical Examination:  Vitals/ General Appearance:   Weight/BMI: Body mass index is 25.9 kg/m².  /70 (BP Location: Left arm, Patient Position: Sitting, BP Cuff Size: Adult)   Pulse 72   Resp 16   Ht 1.829 m (6')   Wt 86.6 kg (191 lb)   SpO2 97%   Vitals:    10/12/21 0757   BP: 100/70   BP Location: Left arm   Patient Position: Sitting   BP Cuff Size: Adult   Pulse: 72   Resp: 16   SpO2: 97%   Weight: 86.6 kg (191  lb)   Height: 1.829 m (6')       Pt. is alert and oriented to time, place and person. Cooperative and in no apparent distress.     Constitutional: Alert, no distress, well-groomed.  Skin: No rashes in visible areas.  Eye: Round. Conjunctiva clear, lids normal. No icterus.   ENT EXAM  Nasal alae/valves collapsible: No   Nasal septum deviation: No   Nasal turbinate hypertrophy: Left: Grade 3   Right: Grade 3  Hard palate narrow: Yes  Hard palate high: Yes  Soft palate/uvula (Mallampati score): 3  Tongue Scalloping: No   Retrognathia: No   Micrognathia: No   Cardiovascular:no murmus/gallops/rubs, normal S1 and S2 heart sounds, regular rate and rhythm  Pulmonary:Clear to auscultation, No wheezes, No crackles.  Neurologic:Awake, alert and oriented x 3, Normal age appropriate gait, No involuntary motions.  Extremities: No clubbing, cyanosis, or edema       ASSESSMENT AND PLAN  KYLAH Christie is a 31 y.o. male with seasonal allergies.  Presents to Sleep Clinic for evaluation of snoring and occasional sleepiness during the day.     1. KYLAH Christie  has symptoms of Obstructive Sleep Apnea (VERITO). KYLAH Christie has symptoms of snoring,  dry mouth, morning headaches, unrefreshed upon awakening. These  interfere with activities of daily living.     Pt has risk factors for VERITO include crowded oropharynx.       The pathophysiology of VERITO and the increased risk of cardiovascular morbidity from untreated VERITO is discussed in detail with the patient.     We have discussed diagnostic options including in-laboratory, attended polysomnography and home sleep testing. We have also discussed treatment options including airway pressurization, reconstructive otolaryngologic surgery, dental appliances and weight management.     Subsequently,treatment options will be discussed based on the diagnostic study. Meanwhile, He is urged to avoid supine sleep, weight gain and alcoholic beverages since all of these can worsen VERITO. He is cautioned against  drowsy driving. If He feels sleepy while driving, advised must pull over for a break/nap, rather than persist on the road, in the interest of Pt's own safety and that of others on the road.    Discussed his interruptions to sleep and shallowness of sleep is likely related to his concern for his  child.  He expresses that he knows this will improve as the child grows older.  Advised to try to maintain same sleep-wake schedule.    Plan  -  He  will be scheduled for an overnight HST to assess sleep related breathing disorder.  - Follow up 1-2 weeks after sleep study to discuss results and treatment options moving forward   -Advised to reach out via [facility] or by phone with any questions or concerns.     2. Regarding treatment of other past medical problems and general health maintenance,  Pt is urged to follow up with PCP.

## 2021-10-12 NOTE — PROGRESS NOTES
Subjective     Demetrius Christie is a 31 y.o. male who presents with Hemochromatosis (Fv )            HPI   Mr. Christie presents for evaluation of hemochromatosis/elevated ferritin. He is accompanied by his wife for today's visit, which was facilitated by  services.     Patient was referred per PCP in 11/2020 for further evaluation of elevated ferritin, without obvious cause, following significant URI/pneumonia: Hematology workup was facilitated by Dr. Buchanan and included: elevated ferritin and CPK; negative hemochromatosis (C2A2Y, H63D, S65C negative); normal TIBC; normal haptoglobin and normal C-reactive protein; negative BRYANT and Lupus anticoagulant. Patient is a non-smoker and does not take supplemental testosterone, which is low normal. Ferritin continues to slowly improve without therapeutic phlebotomy, patient continues with routine surveillance.    He has been evaluated by Neurology, Dr. Amato, for elevated CPK with negative workup to date, no EMG indicated. Cardiology workup continues as per Dr. Rodriguez.      Patient continues with generalized fatigue since viral illness in Fall 2020. He is still not engaging in purposeful exercise, prior to viral illness he was working out 3-4 times weekly. Intermittent palpitations/tachycardia are less frequent. He continues with persistent left back/shoulder discomfort, over several years, which are best relieved with acupuncture PRN. He is a bit more tired from helping with the new baby but otherwise at his baseline.        Review of Systems   Constitutional: Negative for chills, fever and weight loss. Malaise/fatigue: very tired, poor activity tolerance; not doing activity outside of work.   Respiratory: Positive for cough (intermittent and self limiting). Negative for shortness of breath and wheezing.    Cardiovascular: Positive for palpitations (intemittent - has f/v w/ cardiology). Negative for chest pain and leg swelling.   Gastrointestinal: Positive for diarrhea  "(better with lactaid). Negative for blood in stool, constipation, nausea and vomiting.   Genitourinary: Negative for dysuria and hematuria.   Musculoskeletal: Positive for myalgias (L shoulder worse; accupuncture helps best). Negative for joint pain.   Neurological: Positive for dizziness (in the afternoon more). Negative for tingling and headaches.        S/p neuro eval (EMG)   Psychiatric/Behavioral: The patient does not have insomnia.          No Known Allergies        Current Outpatient Medications on File Prior to Visit   Medication Sig Dispense Refill   • Omega-3 Fatty Acids (FISH OIL) 1000 MG Cap capsule Take 1,000 mg by mouth 3 times a day with meals.     • CINNAMON PO Take  by mouth.     • cetirizine (ZYRTEC) 10 MG Tab Take 10 mg by mouth every day.       No current facility-administered medications on file prior to visit.           Objective     /72   Pulse (!) 102   Temp 36.8 °C (98.3 °F) (Temporal)   Resp 16   Ht 1.829 m (6' 0.01\")   Wt 85.7 kg (189 lb 0.7 oz)   SpO2 95%   BMI 25.63 kg/m²      Physical Exam  Vitals reviewed.   Constitutional:       General: He is not in acute distress.     Appearance: He is well-developed. He is not diaphoretic.   HENT:      Head: Normocephalic and atraumatic.      Mouth/Throat:      Pharynx: No oropharyngeal exudate.   Eyes:      General: No scleral icterus.        Right eye: No discharge.         Left eye: No discharge.      Conjunctiva/sclera: Conjunctivae normal.      Pupils: Pupils are equal, round, and reactive to light.   Neck:      Thyroid: No thyromegaly.   Cardiovascular:      Rate and Rhythm: Normal rate and regular rhythm.      Heart sounds: Normal heart sounds. No murmur heard.   No friction rub. No gallop.    Pulmonary:      Effort: Pulmonary effort is normal. No respiratory distress.      Breath sounds: Normal breath sounds. No wheezing.   Abdominal:      General: Bowel sounds are normal. There is no distension.      Palpations: Abdomen is " soft.      Tenderness: There is no abdominal tenderness.   Musculoskeletal:         General: Tenderness (left shoulder) present. Normal range of motion.      Cervical back: Normal range of motion and neck supple.   Lymphadenopathy:      Head:      Right side of head: No submental, submandibular, tonsillar, preauricular, posterior auricular or occipital adenopathy.      Left side of head: No submental, submandibular, tonsillar, preauricular, posterior auricular or occipital adenopathy.      Cervical: No cervical adenopathy.      Right cervical: No superficial or deep cervical adenopathy.     Left cervical: No superficial or deep cervical adenopathy.      Upper Body:      Right upper body: No supraclavicular, axillary or epitrochlear adenopathy.      Left upper body: No supraclavicular, axillary or epitrochlear adenopathy.   Skin:     General: Skin is warm and dry.      Coloration: Skin is not pale.      Findings: No erythema or rash.   Neurological:      Mental Status: He is alert and oriented to person, place, and time.   Psychiatric:         Behavior: Behavior normal.            No visits with results within 1 Day(s) from this visit.   Latest known visit with results is:   Hospital Outpatient Visit on 10/04/2021   Component Date Value Ref Range Status   • Ferritin 10/04/2021 439.0* 22.0 - 322.0 ng/mL Final   • WBC 10/04/2021 6.2  4.8 - 10.8 K/uL Final   • RBC 10/04/2021 5.14  4.70 - 6.10 M/uL Final   • Hemoglobin 10/04/2021 14.3  14.0 - 18.0 g/dL Final   • Hematocrit 10/04/2021 45.2  42.0 - 52.0 % Final   • MCV 10/04/2021 87.9  81.4 - 97.8 fL Final   • MCH 10/04/2021 27.8  27.0 - 33.0 pg Final   • MCHC 10/04/2021 31.6* 33.7 - 35.3 g/dL Final   • RDW 10/04/2021 42.9  35.9 - 50.0 fL Final   • Platelet Count 10/04/2021 278  164 - 446 K/uL Final   • MPV 10/04/2021 9.3  9.0 - 12.9 fL Final   • Neutrophils-Polys 10/04/2021 46.70  44.00 - 72.00 % Final   • Lymphocytes 10/04/2021 42.70* 22.00 - 41.00 % Final   • Monocytes  10/04/2021 5.80  0.00 - 13.40 % Final   • Eosinophils 10/04/2021 3.50  0.00 - 6.90 % Final   • Basophils 10/04/2021 1.00  0.00 - 1.80 % Final   • Immature Granulocytes 10/04/2021 0.30  0.00 - 0.90 % Final   • Nucleated RBC 10/04/2021 0.00  /100 WBC Final   • Neutrophils (Absolute) 10/04/2021 2.91  1.82 - 7.42 K/uL Final    Includes immature neutrophils, if present.   • Lymphs (Absolute) 10/04/2021 2.66  1.00 - 4.80 K/uL Final   • Monos (Absolute) 10/04/2021 0.36  0.00 - 0.85 K/uL Final   • Eos (Absolute) 10/04/2021 0.22  0.00 - 0.51 K/uL Final   • Baso (Absolute) 10/04/2021 0.06  0.00 - 0.12 K/uL Final   • Immature Granulocytes (abs) 10/04/2021 0.02  0.00 - 0.11 K/uL Final   • NRBC (Absolute) 10/04/2021 0.00  K/uL Final                                          Assessment & Plan        1. Elevated ferritin level  CBC WITH DIFFERENTIAL    Comp Metabolic Panel    FERRITIN   2. Elevated CPK  CREATINE KINASE   3. Encounter for hematology follow-up                1.  Elevated CPK: Elevated since viral infection in Fall 2020.  Additional work-up per office for evaluation of subclinical rhabdomyolysis versus metabolic issue was essentially negative; EKG, UA, urine myoglobin, phosphorus, uric acid, carnitine panel . She was referred to neurology with negative work-up and no EMG indicated.  Review diet indicates patient drinks coffee, tea, energy drinks, and minimal free water. Extensive discussion about increasing hydration.  He is encouraged to continue follow-up per PCP, neurology, and cardiology.     2.  Palpitations: Less frequent since his June visit. Patient was referred to cardiology with work-up still pending.      3.  Elevated Ferritin: Negative for hematochromatosis, TIBC and iron studies normal, ferritin continues to decrease without therapeutic phlebotomy. We will continue to monitor, patient will repeat CBC, CMP, ferritin, CPK every 3 months and return 6 months for reevaluation, sooner as needed.         The  patient verbalized agreement and understanding of current plan. All questions and concerns were addressed at time of visit.    Please note that this dictation was created using voice recognition software. I have made every reasonable attempt to correct obvious errors, but I expect that there are errors of grammar and possibly content that I did not discover before finalizing the note.

## 2021-10-14 ENCOUNTER — APPOINTMENT (OUTPATIENT)
Dept: MEDICAL GROUP | Facility: IMAGING CENTER | Age: 31
End: 2021-10-14

## 2021-11-24 ENCOUNTER — HOME STUDY (OUTPATIENT)
Dept: SLEEP MEDICINE | Facility: MEDICAL CENTER | Age: 31
End: 2021-11-24
Attending: STUDENT IN AN ORGANIZED HEALTH CARE EDUCATION/TRAINING PROGRAM
Payer: COMMERCIAL

## 2021-11-24 DIAGNOSIS — G47.30 SLEEP DISORDER BREATHING: ICD-10-CM

## 2021-11-24 PROCEDURE — 95806 SLEEP STUDY UNATT&RESP EFFT: CPT | Performed by: STUDENT IN AN ORGANIZED HEALTH CARE EDUCATION/TRAINING PROGRAM

## 2021-12-01 NOTE — PROCEDURES
DIAGNOSTIC HOME SLEEP TEST (HST) REPORT       PATIENT ID:  NAME:  KYLAH Christie  MRN:               [id number]  YOB: 1990  DATE OF STUDY: 11/24/21      Impression:     This study shows evidence of:     1.Moderate obstructive sleep apnea with  Respiratory Event Index (CARIE) of 25.5 per hour and worse in supine sleep with CARIE at 36. These findings are based on the recording time (flow evaluation time). It is not possible with this device to determine a traditional apnea+hypopnea index (AHI) for total sleep time since EEG channels are not available.     2. Oxygenation O2 Sat. antonella was 71% and mean O2 sat was 94% and baseline O2 at 96 %. O2 sat was below 88% for 10 minutes of the flow evaluation time. Oxygen Desaturation (>=3%) Index was elevated at 24.4/hr. AVG HR was 73 BPM.      TECHNICAL DESCRIPTION:  [employer] device used was a type-III home study device. Home sleep study recording included: Airflow recording by nasal pressure transducer; Respiratory Effort by 1 RIP Band; Oxygen Saturation and heart rate by finger oximetry. A position sensor and a snore channel was also used.    Scoring Criteria: A modification of the the AASM Manual for the Scoring of Sleep and Associated Events, 2012, was used.   Obstructive apnea was scored by cessation of airflow for at least 10 seconds with continuing respiratory effort.  Central apnea was scored by cessation of airflow for at least 10 seconds with no effort.  Hypopnea was scored by a 30% or more reduction in airflow for at least 10 seconds accompanied by an arterial oxygen desaturation of 3% or more.  (For Medicare patients, hypopneas were scored by a 30% or more reduction in airflow for at least 10 seconds accompanied by an arterial oxygen saturation of 4% or more, as required by their insurance, CMS.        General sleep summary: . Total recording time is 7 hours and 37 minutes and total flow evaluation time is 5 hours  and 03 minutes. The patient spent 2 hours and 33 minutes in the supine position and 2 hours and 30 minutes in the nonsupine position.    Respiratory events:    Apneas: 27 (Obstructive apnea index 4.5/hr, Central apnea index 0.8 /hr, mixed 0 /hour)  Hypopneas: Total 102 with a hypopnea index of 20.2    Recommendations:    1. CPAP titration study vs Auto CPAP trial .   2.   In general patients with sleep apnea are advised to avoid alcohol and sedatives and to not operate a motor vehicle while drowsy. In some cases alternative treatment options may prove effective in resolving sleep apnea in these options include upper airway surgery, the use of a dental orthotic or weight loss and positional therapy. Clinical correlation is required.         Sal Dela Cruz MD

## 2021-12-06 ENCOUNTER — OFFICE VISIT (OUTPATIENT)
Dept: SLEEP MEDICINE | Facility: MEDICAL CENTER | Age: 31
End: 2021-12-06
Payer: COMMERCIAL

## 2021-12-06 VITALS
OXYGEN SATURATION: 96 % | WEIGHT: 194 LBS | BODY MASS INDEX: 26.28 KG/M2 | DIASTOLIC BLOOD PRESSURE: 72 MMHG | SYSTOLIC BLOOD PRESSURE: 128 MMHG | HEART RATE: 98 BPM | HEIGHT: 72 IN

## 2021-12-06 DIAGNOSIS — G47.33 OSA (OBSTRUCTIVE SLEEP APNEA): ICD-10-CM

## 2021-12-06 PROCEDURE — 99214 OFFICE O/P EST MOD 30 MIN: CPT | Performed by: NURSE PRACTITIONER

## 2021-12-06 ASSESSMENT — FIBROSIS 4 INDEX: FIB4 SCORE: 0.54

## 2021-12-06 NOTE — PROGRESS NOTES
Chief Complaint   Patient presents with   • Follow-Up     VERITO-Last seen 10/12/2021   • Results     HST-2021       HPI:      Mr. Christie is a 30 y/o male patient who is in today for VERITO f/u and sleep study results. PMH includes seasonal allergic rhinitis due to pollen, overweight, VERITO, never smoker, elevated ferritin, palpitations.     Patient's preferred language is Mandarin, but does speak broken English.  services were provided to the patient today. Were disconnected with Wade Hernández #[id number], reconnected with Maggie #[id number].     He goes to bed between 9:30-10:30 pm and wakes up between 4-4:30 am. He sometimes reports morning headache if he sleeps longer, and also reports snoring.  Patient does have a  which can disrupt his sleep.  He does feel tired throughout the day.  He denies any new health problems or medications.    Sleep Study History:   HSS from 21 indicated moderate obstructive sleep apnea with  Respiratory Event Index (CARIE) of 25.5 per hour and worse in supine sleep with CARIE at 36. Apneas: 27 (Obstructive apnea index 4.5/hr, Central apnea index 0.8 /hr, mixed 0 /hour), Hypopneas: Total 102 with a hypopnea index of 20.2. Oxygenation O2 Sat. antonella was 71% and mean O2 sat was 94% and baseline O2 at 96 %. O2 sat was below 88% for 10 minutes of the flow evaluation time. Oxygen Desaturation (>=3%) Index was elevated at 24.4/hr. AVG HR was 73 BPM.       ROS:    Constitutional: Denies fevers, Denies weight changes  Eyes: Denies changes in vision, no eye pain  Ears/Nose/Throat/Mouth: Denies nasal congestion or sore throat   Cardiovascular: Denies chest pain or palpitations   Respiratory: Denies shortness of breath , Denies cough  Gastrointestinal/Hepatic: Denies abdominal pain, nausea, vomiting,   Skin/Breast: Denies rash,   Neurological: + morning headache, denies confusion,   Psychiatric: denies mood disorder   Sleep: + snoring       Past Medical History:   Diagnosis Date   • Allergy to  pollen 2014   • Anemia 11/13/2020    Hg 12 Ordered ffib, Hg electro   • Bleeding nose 1999   • Heart palpitations 1998   • Wrist fracture, bilateral        History reviewed. No pertinent surgical history.    Family History   Problem Relation Age of Onset   • Arrythmia Mother         had ablation procedure 2017       Social History     Socioeconomic History   • Marital status:      Spouse name: Not on file   • Number of children: Not on file   • Years of education: Not on file   • Highest education level: Not on file   Occupational History   • Not on file   Tobacco Use   • Smoking status: Never Smoker   • Smokeless tobacco: Never Used   Vaping Use   • Vaping Use: Never used   Substance and Sexual Activity   • Alcohol use: Yes     Comment: 1 - 2 beers daily    • Drug use: No   • Sexual activity: Yes     Partners: Female   Other Topics Concern   • Not on file   Social History Narrative   • Not on file     Social Determinants of Health     Financial Resource Strain:    • Difficulty of Paying Living Expenses: Not on file   Food Insecurity:    • Worried About Running Out of Food in the Last Year: Not on file   • Ran Out of Food in the Last Year: Not on file   Transportation Needs:    • Lack of Transportation (Medical): Not on file   • Lack of Transportation (Non-Medical): Not on file   Physical Activity:    • Days of Exercise per Week: Not on file   • Minutes of Exercise per Session: Not on file   Stress:    • Feeling of Stress : Not on file   Social Connections:    • Frequency of Communication with Friends and Family: Not on file   • Frequency of Social Gatherings with Friends and Family: Not on file   • Attends Jew Services: Not on file   • Active Member of Clubs or Organizations: Not on file   • Attends Club or Organization Meetings: Not on file   • Marital Status: Not on file   Intimate Partner Violence:    • Fear of Current or Ex-Partner: Not on file   • Emotionally Abused: Not on file   • Physically  Abused: Not on file   • Sexually Abused: Not on file   Housing Stability:    • Unable to Pay for Housing in the Last Year: Not on file   • Number of Places Lived in the Last Year: Not on file   • Unstable Housing in the Last Year: Not on file       Allergies as of 12/06/2021   • (No Known Allergies)        Vitals:  Vitals:    12/06/21 1415   BP: 128/72   Pulse: 98   SpO2: 96%       Current medications as of today   Current Outpatient Medications   Medication Sig Dispense Refill   • Omega-3 Fatty Acids (FISH OIL) 1000 MG Cap capsule Take 1,000 mg by mouth 3 times a day with meals.     • CINNAMON PO Take  by mouth.     • cetirizine (ZYRTEC) 10 MG Tab Take 10 mg by mouth every day.       No current facility-administered medications for this visit.         Physical Exam: Limited by COVID-19 precautions.  Appearance: Well developed, well nourished, no acute distress  Eyes: PERRL, EOM intact, sclera white, conjunctiva moist  Ears: no lesions or deformities  Hearing: grossly intact  Nose: no lesions or deformities  Respiratory effort: no intercostal retractions or use of accessory muscles  Extremities: no cyanosis or edema  Abdomen: soft  Gait and Station: normal  Digits and nails: no clubbing, cyanosis, petechiae or nodes.  Cranial nerves: grossly intact  Skin: no visible rashes, lesions or ulcers noted  Orientation: Oriented to time, person and place  Mood and affect: mood and affect appropriate, normal interaction with examiner  Judgement: Intact    Assessment:  1. VERITO (obstructive sleep apnea)     2. BMI 26.0-26.9,adult           Plan  Discussed the cardiovascular and neuropsychiatric risks of untreated VERITO; including but not limited to: HTN, DM, MI, ASCVD, CVA, CHF, traffic accidents.     1.  HSS from 11/24/21 indicated moderate obstructive sleep apnea with  Respiratory Event Index (CARIE) of 25.5 per hour and worse in supine sleep with CARIE at 36. Apneas: 27 (Obstructive apnea index 4.5/hr, Central apnea index 0.8 /hr,  mixed 0 /hour), Hypopneas: Total 102 with a hypopnea index of 20.2. Oxygenation O2 Sat. antonella was 71% and mean O2 sat was 94% and baseline O2 at 96 %. O2 sat was below 88% for 10 minutes of the flow evaluation time. Oxygen Desaturation (>=3%) Index was elevated at 24.4/hr. AVG HR was 73 BPM.  Recommendation:  CPAP titration study vs Auto CPAP trial.  In general patients with sleep apnea are advised to avoid alcohol and sedatives and to not operate a motor vehicle while drowsy. In some cases alternative treatment options may prove effective in resolving sleep apnea in these options include upper airway surgery, the use of a dental orthotic or weight loss and positional therapy.  These were reviewed with the patient today.    *Patient is amenable to CPAP trial.  DME order ([facility]) for ResMed autoCPAP 5-15 cmH2O, mask (MOC) and supplies was placed today. Clean mask and supplies weekly with soap and water, and change supplies per insurance guidelines. Advised patient to use the CPAP every night for more than four hours for optimal health benefit and to meet the health insurance 70% compliance guideline. Advised patient he may change the mask out if needed within the first 30 days after he receives his equipment.     2. Continue to stay active.   If your BMI is 25-29.9 you are overweight. If your BMI is 30 or greater you are obese. To lose weight eat less, move more, or both. Any diet that reduces caloric intake can help with weight loss. Extra weight may reduce your lifespan. Avoid dramatic unsustainable dietary changes that result in the yo-yo effect (down then back up.)  Usually small modifications in diet and exercise are easier to stick with.  3. Follow up with the appropriate healthcare practitioners for all other medical problems.  4. F/u in 3 months for first compliance, VERITO.       MUNA Suárez.      This dictation was created using voice recognition software. The accuracy of the dictation is limited to the  abilities of the software. I expect there may be some errors of grammar and possibly content.

## 2022-04-07 ENCOUNTER — HOSPITAL ENCOUNTER (OUTPATIENT)
Dept: LAB | Facility: MEDICAL CENTER | Age: 32
End: 2022-04-07
Attending: NURSE PRACTITIONER
Payer: COMMERCIAL

## 2022-04-07 DIAGNOSIS — R74.8 ELEVATED CPK: ICD-10-CM

## 2022-04-07 DIAGNOSIS — R79.89 ELEVATED FERRITIN LEVEL: ICD-10-CM

## 2022-04-07 LAB
ALBUMIN SERPL BCP-MCNC: 5 G/DL (ref 3.2–4.9)
ALBUMIN/GLOB SERPL: 1.9 G/DL
ALP SERPL-CCNC: 50 U/L (ref 30–99)
ALT SERPL-CCNC: 21 U/L (ref 2–50)
ANION GAP SERPL CALC-SCNC: 12 MMOL/L (ref 7–16)
AST SERPL-CCNC: 27 U/L (ref 12–45)
BASOPHILS # BLD AUTO: 0.5 % (ref 0–1.8)
BASOPHILS # BLD: 0.03 K/UL (ref 0–0.12)
BILIRUB SERPL-MCNC: 0.4 MG/DL (ref 0.1–1.5)
BUN SERPL-MCNC: 18 MG/DL (ref 8–22)
CALCIUM SERPL-MCNC: 9.8 MG/DL (ref 8.5–10.5)
CHLORIDE SERPL-SCNC: 103 MMOL/L (ref 96–112)
CK SERPL-CCNC: 527 U/L (ref 0–154)
CO2 SERPL-SCNC: 25 MMOL/L (ref 20–33)
CREAT SERPL-MCNC: 0.84 MG/DL (ref 0.5–1.4)
EOSINOPHIL # BLD AUTO: 0.14 K/UL (ref 0–0.51)
EOSINOPHIL NFR BLD: 2.5 % (ref 0–6.9)
ERYTHROCYTE [DISTWIDTH] IN BLOOD BY AUTOMATED COUNT: 42.9 FL (ref 35.9–50)
FERRITIN SERPL-MCNC: 492 NG/ML (ref 22–322)
GFR SERPLBLD CREATININE-BSD FMLA CKD-EPI: 120 ML/MIN/1.73 M 2
GLOBULIN SER CALC-MCNC: 2.7 G/DL (ref 1.9–3.5)
GLUCOSE SERPL-MCNC: 88 MG/DL (ref 65–99)
HCT VFR BLD AUTO: 47.3 % (ref 42–52)
HGB BLD-MCNC: 14.7 G/DL (ref 14–18)
IMM GRANULOCYTES # BLD AUTO: 0.01 K/UL (ref 0–0.11)
IMM GRANULOCYTES NFR BLD AUTO: 0.2 % (ref 0–0.9)
LYMPHOCYTES # BLD AUTO: 2.25 K/UL (ref 1–4.8)
LYMPHOCYTES NFR BLD: 40.5 % (ref 22–41)
MCH RBC QN AUTO: 27.6 PG (ref 27–33)
MCHC RBC AUTO-ENTMCNC: 31.1 G/DL (ref 33.7–35.3)
MCV RBC AUTO: 88.7 FL (ref 81.4–97.8)
MONOCYTES # BLD AUTO: 0.4 K/UL (ref 0–0.85)
MONOCYTES NFR BLD AUTO: 7.2 % (ref 0–13.4)
NEUTROPHILS # BLD AUTO: 2.72 K/UL (ref 1.82–7.42)
NEUTROPHILS NFR BLD: 49.1 % (ref 44–72)
NRBC # BLD AUTO: 0 K/UL
NRBC BLD-RTO: 0 /100 WBC
PLATELET # BLD AUTO: 291 K/UL (ref 164–446)
PMV BLD AUTO: 9.6 FL (ref 9–12.9)
POTASSIUM SERPL-SCNC: 4.1 MMOL/L (ref 3.6–5.5)
PROT SERPL-MCNC: 7.7 G/DL (ref 6–8.2)
RBC # BLD AUTO: 5.33 M/UL (ref 4.7–6.1)
SODIUM SERPL-SCNC: 140 MMOL/L (ref 135–145)
WBC # BLD AUTO: 5.6 K/UL (ref 4.8–10.8)

## 2022-04-07 PROCEDURE — 80053 COMPREHEN METABOLIC PANEL: CPT

## 2022-04-07 PROCEDURE — 36415 COLL VENOUS BLD VENIPUNCTURE: CPT

## 2022-04-07 PROCEDURE — 82728 ASSAY OF FERRITIN: CPT

## 2022-04-07 PROCEDURE — 85025 COMPLETE CBC W/AUTO DIFF WBC: CPT

## 2022-04-07 PROCEDURE — 82550 ASSAY OF CK (CPK): CPT

## 2022-04-12 ENCOUNTER — OFFICE VISIT (OUTPATIENT)
Dept: HEMATOLOGY ONCOLOGY | Facility: MEDICAL CENTER | Age: 32
End: 2022-04-12
Payer: COMMERCIAL

## 2022-04-12 VITALS
SYSTOLIC BLOOD PRESSURE: 122 MMHG | RESPIRATION RATE: 16 BRPM | HEIGHT: 72 IN | WEIGHT: 185.19 LBS | TEMPERATURE: 98 F | BODY MASS INDEX: 25.08 KG/M2 | HEART RATE: 89 BPM | OXYGEN SATURATION: 97 % | DIASTOLIC BLOOD PRESSURE: 74 MMHG

## 2022-04-12 DIAGNOSIS — Z09 ENCOUNTER FOR HEMATOLOGY FOLLOW-UP: ICD-10-CM

## 2022-04-12 DIAGNOSIS — R79.89 ELEVATED FERRITIN LEVEL: Chronic | ICD-10-CM

## 2022-04-12 DIAGNOSIS — R07.89 CHEST PRESSURE: ICD-10-CM

## 2022-04-12 DIAGNOSIS — R06.02 SOB (SHORTNESS OF BREATH): ICD-10-CM

## 2022-04-12 DIAGNOSIS — R74.8 ELEVATED CPK: ICD-10-CM

## 2022-04-12 PROCEDURE — 99215 OFFICE O/P EST HI 40 MIN: CPT | Performed by: NURSE PRACTITIONER

## 2022-04-12 ASSESSMENT — ENCOUNTER SYMPTOMS
NAUSEA: 0
HEADACHES: 0
FEVER: 1
COUGH: 1
CHILLS: 0
DIZZINESS: 0
VOMITING: 0
INSOMNIA: 1
TINGLING: 0
WEIGHT LOSS: 0
SHORTNESS OF BREATH: 1
CONSTIPATION: 0
ABDOMINAL PAIN: 1
DIARRHEA: 1
PALPITATIONS: 0
WHEEZING: 0

## 2022-04-12 ASSESSMENT — PATIENT HEALTH QUESTIONNAIRE - PHQ9: CLINICAL INTERPRETATION OF PHQ2 SCORE: 0

## 2022-04-12 ASSESSMENT — FIBROSIS 4 INDEX: FIB4 SCORE: 0.61

## 2022-04-12 NOTE — Clinical Note
Dr. Patel, I spoke with Dr. 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  He ha been evaluated by sleep med but I referred him to Pulm for further evaluation of persistent SOB.  Just wanted him on your radar in case ou could think of anything else that would explain his symptoms and elevated CPK.  Take care,  Lil

## 2022-04-12 NOTE — PROGRESS NOTES
Subjective     Demetrius Christie is a 31 y.o. male who presents with Other (Elevated ferritin level)          HPI   Mr. Christie presents for evaluation of hemochromatosis/elevated ferritin. He is accompanied by his wife for today's visit, which was facilitated by  services.     Patient was referred per PCP in 11/2020 for further evaluation of elevated ferritin, without obvious cause, following significant URI/pneumonia: Hematology workup was facilitated by Dr. Buchanan and included: elevated ferritin and CPK; negative hemochromatosis (C2A2Y, H63D, S65C negative); normal TIBC; normal haptoglobin and normal C-reactive protein; normal sed rate; negative BRYANT and Lupus anticoagulant; normal myoglobin and carnitine panel, normal TSH. Patient is a non-smoker and does not take supplemental testosterone, which is low normal. Ferritin continues to remain stably elevated, possibly reactive to elevated CPK. TP has not been completed or indicated to date, suspect reactive to elevated CPK, patient continues with routine surveillance.     Patient continues with elevated CPK since viral illness in Fall 2020. In addition to above testing, he has been evaluated by Neurology, Dr. Amato, with negative workup per review of records and so EMG was not indicated; CK isoenzymes were normal. Sleep study indicated moderate obstructive apnea with CPAP receipt still pending. He has been evaluated by Sleep Medicine with moderate obstructive sleep apnea noted, CPAP trial appears pending. Cardiology consultation completed per Dr. Rodriguez, patient advised to follow up to review results.    Patient continues with generalized fatigue and myalgia. He continues working construction full time and gets worn out by additional parenting duties because of the new baby. He continues with persistent left back/shoulder discomfort, over several years, which remains best relieved with acupuncture PRN.         Review of Systems   Constitutional: Positive for fever  "(not measured but feels febrile, more often over past week) and malaise/fatigue (2 babies at home and wife returned to work; does not recover as well since pneumonia 2 years ago). Negative for chills and weight loss.   HENT: Positive for congestion.    Respiratory: Positive for cough (intermittent) and shortness of breath. Negative for wheezing.    Cardiovascular: Positive for chest pain (pressure since pneumonia 2 years; noticing with bending/turning/crouching at work - noticing more over past 6 months - self limiting to date). Negative for palpitations and leg swelling.   Gastrointestinal: Positive for abdominal pain (with bending/turning/crouching at work - noticing more over past 6 months - self limiting to date) and diarrhea (on/off r/t senistive stomach, some help with lactaid). Negative for constipation, nausea and vomiting.   Genitourinary: Negative for dysuria.        Urine is darker yellow (not brown or tan/dark)   Musculoskeletal: Positive for myalgias (consistent w/ baseline for construction work - no issues in performing job functions; accupunture helps a bit). Negative for joint pain.   Neurological: Negative for dizziness, tingling and headaches.   Endo/Heme/Allergies: Positive for environmental allergies.   Psychiatric/Behavioral: The patient has insomnia (2 babies prevent sleep).        No Known Allergies        Current Outpatient Medications on File Prior to Visit   Medication Sig Dispense Refill   • Omega-3 Fatty Acids (FISH OIL) 1000 MG Cap capsule Take 1,000 mg by mouth 3 times a day with meals.     • CINNAMON PO Take  by mouth.     • cetirizine (ZYRTEC) 10 MG Tab Take 10 mg by mouth every day.       No current facility-administered medications on file prior to visit.              Objective     /74   Pulse 89   Temp 36.7 °C (98 °F) (Temporal)   Resp 16   Ht 1.829 m (6' 0.01\")   Wt 84 kg (185 lb 3 oz)   SpO2 97%   BMI 25.11 kg/m²      Physical Exam  Vitals reviewed.   Constitutional: "       General: He is not in acute distress.     Appearance: He is well-developed. He is not diaphoretic.   HENT:      Head: Normocephalic and atraumatic.      Mouth/Throat:      Pharynx: No oropharyngeal exudate.   Eyes:      General: No scleral icterus.        Right eye: No discharge.         Left eye: No discharge.      Conjunctiva/sclera: Conjunctivae normal.      Pupils: Pupils are equal, round, and reactive to light.   Neck:      Thyroid: No thyromegaly.   Cardiovascular:      Rate and Rhythm: Normal rate and regular rhythm.      Heart sounds: Normal heart sounds. No murmur heard.    No friction rub. No gallop.   Pulmonary:      Effort: Pulmonary effort is normal. No respiratory distress.      Breath sounds: Normal breath sounds. No wheezing.   Chest:   Breasts:      Right: No axillary adenopathy or supraclavicular adenopathy.      Left: No axillary adenopathy or supraclavicular adenopathy.       Abdominal:      General: Bowel sounds are normal. There is no distension.      Palpations: Abdomen is soft. There is no hepatomegaly or splenomegaly.      Tenderness: There is generalized abdominal tenderness (mild).   Musculoskeletal:         General: Tenderness (shoulders) present. Normal range of motion.      Cervical back: Normal range of motion and neck supple.   Lymphadenopathy:      Head:      Right side of head: No submental, submandibular, tonsillar, preauricular, posterior auricular or occipital adenopathy.      Left side of head: No submental, submandibular, tonsillar, preauricular, posterior auricular or occipital adenopathy.      Cervical: No cervical adenopathy.      Right cervical: No superficial or deep cervical adenopathy.     Left cervical: No superficial or deep cervical adenopathy.      Upper Body:      Right upper body: No supraclavicular, axillary or epitrochlear adenopathy.      Left upper body: No supraclavicular, axillary or epitrochlear adenopathy.   Skin:     General: Skin is warm and dry.       Coloration: Skin is not pale.      Findings: No erythema or rash.   Neurological:      Mental Status: He is alert and oriented to person, place, and time.   Psychiatric:         Behavior: Behavior normal.         No visits with results within 1 Day(s) from this visit.   Latest known visit with results is:   Hospital Outpatient Visit on 04/07/2022   Component Date Value Ref Range Status   • Ferritin 04/07/2022 492.0 (A) 22.0 - 322.0 ng/mL Final   • CPK Total 04/07/2022 527 (A) 0 - 154 U/L Final   • Sodium 04/07/2022 140  135 - 145 mmol/L Final   • Potassium 04/07/2022 4.1  3.6 - 5.5 mmol/L Final   • Chloride 04/07/2022 103  96 - 112 mmol/L Final   • Co2 04/07/2022 25  20 - 33 mmol/L Final   • Anion Gap 04/07/2022 12.0  7.0 - 16.0 Final   • Glucose 04/07/2022 88  65 - 99 mg/dL Final   • Bun 04/07/2022 18  8 - 22 mg/dL Final   • Creatinine 04/07/2022 0.84  0.50 - 1.40 mg/dL Final   • Calcium 04/07/2022 9.8  8.5 - 10.5 mg/dL Final   • AST(SGOT) 04/07/2022 27  12 - 45 U/L Final   • ALT(SGPT) 04/07/2022 21  2 - 50 U/L Final   • Alkaline Phosphatase 04/07/2022 50  30 - 99 U/L Final   • Total Bilirubin 04/07/2022 0.4  0.1 - 1.5 mg/dL Final   • Albumin 04/07/2022 5.0 (A) 3.2 - 4.9 g/dL Final   • Total Protein 04/07/2022 7.7  6.0 - 8.2 g/dL Final   • Globulin 04/07/2022 2.7  1.9 - 3.5 g/dL Final   • A-G Ratio 04/07/2022 1.9  g/dL Final   • WBC 04/07/2022 5.6  4.8 - 10.8 K/uL Final   • RBC 04/07/2022 5.33  4.70 - 6.10 M/uL Final   • Hemoglobin 04/07/2022 14.7  14.0 - 18.0 g/dL Final   • Hematocrit 04/07/2022 47.3  42.0 - 52.0 % Final   • MCV 04/07/2022 88.7  81.4 - 97.8 fL Final   • MCH 04/07/2022 27.6  27.0 - 33.0 pg Final   • MCHC 04/07/2022 31.1 (A) 33.7 - 35.3 g/dL Final   • RDW 04/07/2022 42.9  35.9 - 50.0 fL Final   • Platelet Count 04/07/2022 291  164 - 446 K/uL Final   • MPV 04/07/2022 9.6  9.0 - 12.9 fL Final   • Neutrophils-Polys 04/07/2022 49.10  44.00 - 72.00 % Final   • Lymphocytes 04/07/2022 40.50  22.00 -  41.00 % Final   • Monocytes 04/07/2022 7.20  0.00 - 13.40 % Final   • Eosinophils 04/07/2022 2.50  0.00 - 6.90 % Final   • Basophils 04/07/2022 0.50  0.00 - 1.80 % Final   • Immature Granulocytes 04/07/2022 0.20  0.00 - 0.90 % Final   • Nucleated RBC 04/07/2022 0.00  /100 WBC Final   • Neutrophils (Absolute) 04/07/2022 2.72  1.82 - 7.42 K/uL Final    Includes immature neutrophils, if present.   • Lymphs (Absolute) 04/07/2022 2.25  1.00 - 4.80 K/uL Final   • Monos (Absolute) 04/07/2022 0.40  0.00 - 0.85 K/uL Final   • Eos (Absolute) 04/07/2022 0.14  0.00 - 0.51 K/uL Final   • Baso (Absolute) 04/07/2022 0.03  0.00 - 0.12 K/uL Final   • Immature Granulocytes (abs) 04/07/2022 0.01  0.00 - 0.11 K/uL Final   • NRBC (Absolute) 04/07/2022 0.00  K/uL Final   • GFR (CKD-EPI) 04/07/2022 120  >60 mL/min/1.73 m 2 Final    Comment: Effective 3/15/2022, estimated Glomerular Filtration Rate  is calculated using race neutral CKD-EPI 2021 equation  per NKF-ASN recommendations.                                                           MRI Abdomen   3/3/2021 8:38 AM  HISTORY/REASON FOR EXAM:  Hemochromatosis; 30 year old  with ferritine over 700 x2 Hb A1c 6.1 c/w pre-diabetes,  no HFE mutation; differential involve hemochromatosis secondary to rare mutation . Please asses degree of iron accumulation in the liver     TECHNIQUE/EXAM DESCRIPTION: MRI of the liver with dynamic IV gadolinium enhancement.     MR imaging of the liver was performed. MR images of the liver were obtained with coronal single-shot fast spin-echo T2, fat-suppressed axial FRFSE T2, axial in-phase and out-of-phase FSPGR T1, precontrast fat-suppressed FSPGR T1, dynamic gadolinium   enhanced axial fat-suppressed T1 FSPGR in the arterial dominant, portal venous, and 2-minute and 4-minute delayed phases, with delayed coronal fat-suppressed T1 FSPGR sequence..     The study was performed on a [employer] Signa 1.5 [place] MRI scanner.     17 mL ProHance contrast was  administered intravenously.     COMPARISON: None available.     FINDINGS:  Liver: The liver is normal in size and configuration. No signal change seen in the in and out of phase images to suggest significant iron deposition in the liver.     No focal hepatic lesion is seen.     Bile Ducts: No intrahepatic or extrahepatic bile duct dilation or lesion seen.     Spleen: Normal in size without focal lesion.     Gallbladder: Normal.     Pancreas and Pancreatic Duct: Normal. No pancreatic duct dilation is seen.     Kidneys: Normal.     Adrenal Glands: Normal.     Ascites: Not present.     Lymph Nodes: No abdominal lymphadenopathy is seen.     Visualized Bowel: Grossly normal.     Bones: No suspicious osseous lesions are noted.     IMPRESSION:  1. No signal change seen in the in and out of phase images to suggest significant iron deposition in the liver.     2. Normal signal in the spleen and marrow. No evidence of iron deposition in these organs.           Assessment & Plan        1. Elevated ferritin level     2. Encounter for hematology follow-up     3. Elevated CPK     4. Chest pressure  Referral to Pulmonary and Sleep Medicine    CANCELED: Referral to Pulmonary and Sleep Medicine   5. SOB (shortness of breath)  Referral to Pulmonary and Sleep Medicine    CANCELED: Referral to Pulmonary and Sleep Medicine          1.  Elevated CPK: Elevated since viral infection in Fall 2020. Additional work-up per office for evaluation of subclinical rhabdomyolysis versus metabolic issue was essentially negative; EKG, UA, urine myoglobin, phosphorus, uric acid, TSH, carnitine panel were all negative. Neurology workup was negative with EMG not indicated per review of records, to follow up with Neurology PRN. Concern for post viral myositis will confer with Onc MD and cc PCP. for additional recommendations.     2.  Palpitations: Less frequent but still occur, patient is established with cardiology with follow up PRN.     3.  SOB:  Patient has been evaluated by sleep medicine with moderate-sever apnea noted, receipt of CPAP remains pending. He continues with SOB and poor activity tolerance since fall 2020 and will be referred to Pulm for further evaluation and consideration of pulmonary rehab.    4.  Elevated Ferritin: Negative for hematochromatosis, TIBC and iron studies normal. Ferritin remains elevated, MRI completed 3/2021 shows no concerning findings regarding iron deposition. Suspect elevation is related to elevated CPK. Patient will continue to monitor symptoms and return in 6 months for reevaluation, sooner as needed.  Labs (CBC, CMP, ferritin, CPK) will be completed prior to that visit.           My total time spent caring for the patient on the day of the encounter was 40 minutes. This does not include time spent on separately billable procedures/tests.      The patient verbalized agreement and understanding of current plan. All questions and concerns were addressed at time of visit.    Please note that this dictation was created using voice recognition software. I have made every reasonable attempt to correct obvious errors, but I expect that there are errors of grammar and possibly content that I did not discover before finalizing the note.

## 2022-04-12 NOTE — Clinical Note
This is th patient I spoke to you about. I sent his PCP a message too - any ideas or referrals come to mind?

## 2022-04-20 ASSESSMENT — ENCOUNTER SYMPTOMS: MYALGIAS: 1

## 2022-05-17 ENCOUNTER — APPOINTMENT (OUTPATIENT)
Dept: RADIOLOGY | Facility: MEDICAL CENTER | Age: 32
End: 2022-05-17
Attending: EMERGENCY MEDICINE
Payer: COMMERCIAL

## 2022-05-17 ENCOUNTER — APPOINTMENT (OUTPATIENT)
Dept: RADIOLOGY | Facility: MEDICAL CENTER | Age: 32
End: 2022-05-17
Payer: COMMERCIAL

## 2022-05-17 ENCOUNTER — OCCUPATIONAL MEDICINE (OUTPATIENT)
Dept: URGENT CARE | Facility: CLINIC | Age: 32
End: 2022-05-17
Payer: COMMERCIAL

## 2022-05-17 ENCOUNTER — HOSPITAL ENCOUNTER (EMERGENCY)
Facility: MEDICAL CENTER | Age: 32
End: 2022-05-17
Attending: EMERGENCY MEDICINE
Payer: COMMERCIAL

## 2022-05-17 VITALS
SYSTOLIC BLOOD PRESSURE: 147 MMHG | RESPIRATION RATE: 18 BRPM | OXYGEN SATURATION: 95 % | BODY MASS INDEX: 25.73 KG/M2 | TEMPERATURE: 97.8 F | HEART RATE: 72 BPM | HEIGHT: 72 IN | DIASTOLIC BLOOD PRESSURE: 106 MMHG | WEIGHT: 190 LBS

## 2022-05-17 VITALS
DIASTOLIC BLOOD PRESSURE: 84 MMHG | HEART RATE: 87 BPM | WEIGHT: 186 LBS | SYSTOLIC BLOOD PRESSURE: 122 MMHG | BODY MASS INDEX: 25.19 KG/M2 | RESPIRATION RATE: 14 BRPM | HEIGHT: 72 IN | TEMPERATURE: 97.9 F | OXYGEN SATURATION: 96 %

## 2022-05-17 DIAGNOSIS — S16.1XXA STRAIN OF NECK MUSCLE, INITIAL ENCOUNTER: ICD-10-CM

## 2022-05-17 DIAGNOSIS — S30.1XXA CONTUSION OF ABDOMINAL WALL, INITIAL ENCOUNTER: ICD-10-CM
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
## 2022-05-17 DIAGNOSIS — M54.2 ACUTE NECK PAIN: ICD-10-CM

## 2022-05-17 DIAGNOSIS — R42 DIZZINESS: ICD-10-CM

## 2022-05-17 LAB
AMP AMPHETAMINE: NORMAL
BREATH ALCOHOL COMMENT: 0
COC COCAINE: NORMAL
INT CON NEG: NORMAL
INT CON POS: NORMAL
MET METHAMPHETAMINES: NORMAL
OPI OPIATES: NORMAL
PCP PHENCYCLIDINE: NORMAL
POC BREATHALIZER: 0 PERCENT (ref 0–0.01)
POC DRUG COMMENT 753798-OCCUPATIONAL HEALTH: NEGATIVE
THC: NORMAL

## 2022-05-17 PROCEDURE — 71260 CT THORAX DX C+: CPT

## 2022-05-17 PROCEDURE — 80305 DRUG TEST PRSMV DIR OPT OBS: CPT | Performed by: NURSE PRACTITIONER

## 2022-05-17 PROCEDURE — 70498 CT ANGIOGRAPHY NECK: CPT

## 2022-05-17 PROCEDURE — 700117 HCHG RX CONTRAST REV CODE 255: Performed by: EMERGENCY MEDICINE

## 2022-05-17 PROCEDURE — 96374 THER/PROPH/DIAG INJ IV PUSH: CPT

## 2022-05-17 PROCEDURE — 82075 ASSAY OF BREATH ETHANOL: CPT | Performed by: NURSE PRACTITIONER

## 2022-05-17 PROCEDURE — 72125 CT NECK SPINE W/O DYE: CPT

## 2022-05-17 PROCEDURE — 99285 EMERGENCY DEPT VISIT HI MDM: CPT

## 2022-05-17 PROCEDURE — 305948 HCHG GREEN TRAUMA ACT PRE-NOTIFY NO CC

## 2022-05-17 PROCEDURE — 700111 HCHG RX REV CODE 636 W/ 250 OVERRIDE (IP): Performed by: EMERGENCY MEDICINE

## 2022-05-17 PROCEDURE — 99214 OFFICE O/P EST MOD 30 MIN: CPT | Performed by: NURSE PRACTITIONER

## 2022-05-17 RX ORDER — KETOROLAC TROMETHAMINE 30 MG/ML
30 INJECTION, SOLUTION INTRAMUSCULAR; INTRAVENOUS ONCE
Status: COMPLETED | OUTPATIENT
Start: 2022-05-17 | End: 2022-05-17

## 2022-05-17 RX ORDER — ONDANSETRON 4 MG/1
4 TABLET, ORALLY DISINTEGRATING ORAL ONCE
Status: COMPLETED | OUTPATIENT
Start: 2022-05-17 | End: 2022-05-17

## 2022-05-17 RX ADMIN — IOHEXOL 80 ML: 350 INJECTION, SOLUTION INTRAVENOUS at 16:58

## 2022-05-17 RX ADMIN — ONDANSETRON 4 MG: 4 TABLET, ORALLY DISINTEGRATING ORAL at 19:49

## 2022-05-17 RX ADMIN — KETOROLAC TROMETHAMINE 30 MG: 30 INJECTION, SOLUTION INTRAMUSCULAR at 17:40

## 2022-05-17 RX ADMIN — IOHEXOL 80 ML: 350 INJECTION, SOLUTION INTRAVENOUS at 19:20

## 2022-05-17 ASSESSMENT — ENCOUNTER SYMPTOMS
NECK PAIN: 1
DIZZINESS: 1
HEADACHES: 0
NAUSEA: 1
BACK PAIN: 0

## 2022-05-17 ASSESSMENT — FIBROSIS 4 INDEX: FIB4 SCORE: 0.61

## 2022-05-17 NOTE — ED NOTES
Report received. Pt to [facility], awaiting imaging. Pt educated to spinal precautions and need for c spine.
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
## 2022-05-18 ENCOUNTER — APPOINTMENT (OUTPATIENT)
Dept: MEDICAL GROUP | Facility: IMAGING CENTER | Age: 32
End: 2022-05-18
Payer: COMMERCIAL

## 2022-05-18 NOTE — ED NOTES
Pt up to sob, c/o dizziness with movement. Ambulated to bathroom sba. Returned to room however remains dizzy. ERP notified.

## 2022-05-18 NOTE — DISCHARGE INSTRUCTIONS
Rest, follow-up with your doctor.  Turn for any new medical problems or complaints.  Take ibuprofen or Tylenol for pain.  Expect some additional soreness.  Return for worsening pain, dizziness, any numbness weakness or other concerns.

## 2022-05-18 NOTE — ED NOTES
Pt has discharge orders. Pt educated on discharge instructions.  Pt verbalizes understanding.  Pt able to stand and walk without dizziness.  Pt states he is feeling better PIV removed. Pt ambulatory to lobby. Pt going home with self.

## 2022-05-20 ENCOUNTER — APPOINTMENT (OUTPATIENT)
Dept: MEDICAL GROUP | Facility: IMAGING CENTER | Age: 32
End: 2022-05-20

## 2022-05-23 ENCOUNTER — APPOINTMENT (OUTPATIENT)
Dept: MEDICAL GROUP | Facility: IMAGING CENTER | Age: 32
End: 2022-05-23

## 2022-09-12 ENCOUNTER — OFFICE VISIT (OUTPATIENT)
Dept: SLEEP MEDICINE | Facility: MEDICAL CENTER | Age: 32
End: 2022-09-12
Payer: COMMERCIAL

## 2022-09-12 VITALS
HEART RATE: 78 BPM | BODY MASS INDEX: 23.84 KG/M2 | DIASTOLIC BLOOD PRESSURE: 72 MMHG | OXYGEN SATURATION: 97 % | SYSTOLIC BLOOD PRESSURE: 115 MMHG | WEIGHT: 176 LBS | HEIGHT: 72 IN

## 2022-09-12 DIAGNOSIS — K21.9 GASTROESOPHAGEAL REFLUX DISEASE, UNSPECIFIED WHETHER ESOPHAGITIS PRESENT: ICD-10-CM

## 2022-09-12 DIAGNOSIS — R06.02 SOB (SHORTNESS OF BREATH): ICD-10-CM

## 2022-09-12 DIAGNOSIS — J30.1 SEASONAL ALLERGIC RHINITIS DUE TO POLLEN: ICD-10-CM

## 2022-09-12 DIAGNOSIS — R06.2 WHEEZING: ICD-10-CM

## 2022-09-12 PROCEDURE — 99214 OFFICE O/P EST MOD 30 MIN: CPT | Performed by: INTERNAL MEDICINE

## 2022-09-12 RX ORDER — BUDESONIDE AND FORMOTEROL FUMARATE DIHYDRATE 160; 4.5 UG/1; UG/1
2 AEROSOL RESPIRATORY (INHALATION) 2 TIMES DAILY
Qty: 2 EACH | Refills: 2 | Status: SHIPPED | OUTPATIENT
Start: 2022-09-12

## 2022-09-12 RX ORDER — PANTOPRAZOLE SODIUM 20 MG/1
20 TABLET, DELAYED RELEASE ORAL DAILY
Qty: 30 TABLET | Refills: 2 | Status: SHIPPED | OUTPATIENT
Start: 2022-09-12

## 2022-09-12 RX ORDER — ALBUTEROL SULFATE 90 UG/1
2 AEROSOL, METERED RESPIRATORY (INHALATION) EVERY 6 HOURS PRN
Qty: 8.5 G | Refills: 6 | Status: SHIPPED | OUTPATIENT
Start: 2022-09-12

## 2022-09-12 ASSESSMENT — FIBROSIS 4 INDEX: FIB4 SCORE: 0.63

## 2022-09-12 NOTE — PROGRESS NOTES
"Pulmonary Clinic Office Note    Reason for visit: \"SOB\"    Chart reviewed prior to patient interview.    KYLAH Christie is a 32 y.o. year old male, with a PMHx of VERITO  who presented to the Pulmonary Clinic for a SOB and a new referral.      Background:  Was evaluated by sleep medicine and found to have VERITO. He was started on cpap and on current treatment. Also had a hx of cPK elevation.  He started feeling unwell since 2020, went to the hospital one time.     Today:  Patient speaks mandarin only and this visit was performed with a  via ipad.   He reports symptoms of since Admited for pneumonia in 2020, since then has been having SOB and ADHIKARI, reports that his breathing was never back to his baseline.   After that admission has difficulties to recover from any cold.   He describes intermitent chest pain with GERD symptoms, no meds  Reports wheezing.   No history of respiratory failure requiring mechanical ventilation  No history of hemoptysis.  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  Pet(s) exposure: 1 dog.  TB exposure: none    MMRC Dyspnea Scale  Grade  Description of Breathlessness   0  I only get breathless with strenuous exercise.   1  I get short of breath when hurrying on level ground or walking up a slight hill.   2  On level ground, I walk slower than people of the " same age because of breathlessness, or have to stop for breath when walking at my own pace.   3  I stop for breath after walking about 100 yards or after a few minutes on level ground.   4  I am too breathless to leave the house or I am breathless when dressing.     Influenza Vaccine:  yes last year  COVID vaccine: 3 doses   Pneumovax:  none  Hospitalizations due to the main problem address today: as above.  Never intubated for asthma.    ----------------------------------------------------------------------------------------------------------------------------------------------------------------------------------------------------------------------------------------------------------------  Past Medical History:   Diagnosis Date    Allergy to pollen 2014    Anemia 11/13/2020    Hg 12 Ordered ffib, Hg electro    Bleeding nose 1999    Cough     Heart palpitations 1998    Shortness of breath     Sputum production     Wheezing     Wrist fracture, bilateral      No Known Allergies  Family History   Problem Relation Age of Onset    Arrythmia Mother         had ablation procedure 2017     History reviewed. No pertinent surgical history.  Social History     Tobacco Use    Smoking status: Never    Smokeless tobacco: Never   Vaping Use    Vaping Use: Never used   Substance Use Topics    Alcohol use: Yes     Comment: 1 - 2 beers daily     Drug use: No         Review of Systems (Positive in Bold, otherwise negative)    Constitutional: fever, chills, night sweats, weightloss  HEENT: headaches, migraines, vision changes, blurred vision, dry eyes,  changes in hearing, rhinorrhea, sinus congestion, dysphagia  Hoarseness of voice, choking  CV: chest pain, ADHIKARI, orthopnea, edema, palpitations  Resp: SOB, cough, hemoptysis, asthma, repeated infections, sputum production, wheezing  GI: changes in appetite, nausea, vomiting, diarrhea, constipation, pain, GERD  : Dysuria, hematuria, nocturia  Lymph: swollen glands  MSK: Muscle  weakness, wasting, arthralgia, myalgia  Neuro/Psych: Sensory disturbances, seizures, syncope, anxiety, depression    Objective:  Vitals: /72 (BP Location: Left arm, Patient Position: Sitting, BP Cuff Size: Adult)   Pulse 78   Ht 1.829 m (6')   Wt 79.8 kg (176 lb)   SpO2 97% Comment: room air at rest  BMI 23.87 kg/m²     Wt Readings from Last 3 Encounters:   09/12/22 79.8 kg (176 lb)   05/17/22 86.2 kg (190 lb)   05/17/22 84.4 kg (186 lb)     Gen: AAO x 3, appears of stated age, well-nourished and in NAD  Eyes: sclerae anicteric, conjunctivae appear normal, PEERLA, EOM in tact  ENT: EAC appears normal w/o erythema/exudate.  Neck: Supple w/o evidence of LAD, thyroid normal and size and w/o nodularity  CV: RRR w/o murmurs, rubs, gallops. Normal S1/S2. No peripheral edema orJVD.  Lungs: CTAB w/o wheezing, rales, rhonchi. Good air entry, normal chest excursion.  GI: Soft and non-tender, + BS x 4. No rebound or guarding.  Extremities: +2/4 bilateral pedal pulses, cool and dry, no edema.  MS: +5/5 bilateral UE/LE muscle strength testing, no obvious joint deformities, swelling noted, good overall muscle tone  Neuro: No focal neurological deficits    ----------------------------------------------------------------------------------------------------------------------------------------------------------------------------------------------------------------------------------------------------------------  Labs, Imaging & Scoring Systems:    Lab results since patient's previous encounter were reviewed with the patient.  Pertinent results discussed below or included within the note.    PFTs (Date: none)-    Sleep study 11/24/21   1.Moderate obstructive sleep apnea with  Respiratory Event Index (CARIE) of 25.5 per hour and worse in supine sleep with CARIE at 36. These findings are based on the recording time (flow evaluation time). It is not possible with this device to determine a traditional apnea+hypopnea index (AHI)  for total sleep time since EEG channels are not available.    2. Oxygenation O2 Sat. antonella was 71% and mean O2 sat was 94% and baseline O2 at 96 %. O2 sat was below 88% for 10 minutes of the flow evaluation time. Oxygen Desaturation (>=3%) Index was elevated at 24.4/hr. AVG HR was 73 BPM.  Recommendations:   1. CPAP titration study vs Auto CPAP trial .   2.    In general patients with sleep apnea are advised to avoid alcohol and sedatives and to not operate a motor vehicle while drowsy. In some cases alternative treatment options may prove effective in resolving sleep apnea in these options include upper airway surgery, the use of a dental orthotic or weight loss and positional therapy. Clinical correlation is required.      CXR: (Date: 10/5/2020)-  Bilateral patchy opacities are consistent with atypical infection, possibly Covid 19    CT Chest: (Date: 5/17/22)-  Lungs: Minimal bilateral dependent atelectasis.   Pleura: No pleural effusion.   Mediastinum/Roselyn: No significant adenopathy.   Cardiac: Heart normal in size without pericardial effusion.  1.  No evidence for acute intrathoracic injury.  2.  No evidence for acute intra-abdominal trauma.    ECHO, (date: none)    ----------------------------------------------------------------------------------------------------------------------------------------------------------------------------------------------------------------------------------------------------------------      Impression:    VERITO  Suspected Asthma vs RAD  GERD      Discussion:    KYLAH Christie is a 32 y.o. with GERD, VERITO, wheezing, SoB suspected for asthma.     Plan:  Continue follow up with Dr Dela Cruz for sleep apnea management  Will obtain PFTs and determine if any obstruction pattern is present, less likely restrictive pattern although could be possible with his hx of elevated CPK and myositis that is under workup by PCP  Will need an allergen test  Recommend to empiricaly start symbicort scheduled  "and prn albuterol and see if this improves his symptoms.  GERD recommendations: keep head of the bed elevated at 30 degrees, avoid eating anything 2 hours prior to going to bed, avoid cafeinated drinks when possible even during the day. Try to avoid the following but if you have to have it, you can try to enjoy cafeinated drinks, tea, chocolate (either drink of bar-food) or carbonated drinks in the first half of the day but please avoid it after 2-3pm.   Will need to start a PPI, orders place  Recommend to wear a mask at work      * Patient Education                         - Educated the patient on his/her disease processes                         - Answered all questions to the patients satisfaction.                         - Reminded the patient to bring all of his/her medication bottles to the next office visit.                           * Return To Clinic:  3 months or PRN      The patient voiced understanding of the above treatment approach and agreed with the direction of care. The patient was educated about their conditions and all questions were addressed during this encounter. I have also reminded the patient to bring all of their medications to the next office visit.  KYLAH Christie was instructed to call the office if any questions or concerns arise prior to their next appointment.  This note reflects my clinical thought processes and is intended primarily for the exchange of information between healthcare providers.  It is not written primarily to communicate with the patient or family directly, which takes place in-person in clinic, by phone/virtual visits or the bedside.  This note might contain sensitive information, including substance use, mental health and consideration of sensitive, serious or other \"do-not-miss\" diagnoses, which is further discussed at the bedside.    Margarito Sweeney MD FACP  Pulmonary/Critical Care       "

## 2022-09-12 NOTE — PATIENT INSTRUCTIONS
Thanks for coming to the office today.  Please bring all of your medication bottles to the next office visit, specially inhalers.  If requested, please obtain the prior records from your lung doctor.  GERD recommendations: keep head of the bed elevated at 30 degrees, avoid eating anything 2 hours prior to going to bed, avoid cafeinated drinks when possible even during the day. Try to avoid the following but if you have to have it, you can try to enjoy cafeinated drinks, tea, chocolate (either drink of bar-food) or carbonated drinks in the first half of the day but please avoid it after 2-3pm.     If you have been prescribed inhalers, please use them as indicated and please rinse your mouth after using them each time.  Call if any questions!                    Return To Clinic:  3 months. Please do not forget to come at least 20 minutes prior to your appointment.  It has been a pleasure seeing you today.    Margarito Sweeney MD  Pulmonary/Critical Care

## 2022-09-13 ENCOUNTER — HOSPITAL ENCOUNTER (OUTPATIENT)
Dept: LAB | Facility: MEDICAL CENTER | Age: 32
End: 2022-09-13
Attending: INTERNAL MEDICINE
Payer: COMMERCIAL

## 2022-09-13 DIAGNOSIS — R06.2 WHEEZING: ICD-10-CM

## 2022-09-13 DIAGNOSIS — K21.9 GASTROESOPHAGEAL REFLUX DISEASE, UNSPECIFIED WHETHER ESOPHAGITIS PRESENT: ICD-10-CM

## 2022-09-13 DIAGNOSIS — J30.1 SEASONAL ALLERGIC RHINITIS DUE TO POLLEN: ICD-10-CM

## 2022-09-13 DIAGNOSIS — R06.02 SOB (SHORTNESS OF BREATH): ICD-10-CM

## 2022-09-13 PROCEDURE — 86003 ALLG SPEC IGE CRUDE XTRC EA: CPT | Mod: 91

## 2022-09-13 PROCEDURE — 36415 COLL VENOUS BLD VENIPUNCTURE: CPT

## 2022-09-27 LAB — PATHOLOGY STUDY: NORMAL

## 2022-10-12 ENCOUNTER — HOSPITAL ENCOUNTER (OUTPATIENT)
Facility: MEDICAL CENTER | Age: 32
End: 2022-10-12
Attending: NURSE PRACTITIONER
Payer: COMMERCIAL

## 2022-10-12 ENCOUNTER — HOSPITAL ENCOUNTER (OUTPATIENT)
Dept: HEMATOLOGY ONCOLOGY | Facility: MEDICAL CENTER | Age: 32
End: 2022-10-12
Attending: NURSE PRACTITIONER
Payer: COMMERCIAL

## 2022-10-12 VITALS
HEART RATE: 94 BPM | TEMPERATURE: 98.4 F | SYSTOLIC BLOOD PRESSURE: 112 MMHG | DIASTOLIC BLOOD PRESSURE: 60 MMHG | BODY MASS INDEX: 25.11 KG/M2 | HEIGHT: 72 IN | RESPIRATION RATE: 14 BRPM | WEIGHT: 185.4 LBS | OXYGEN SATURATION: 96 %

## 2022-10-12 DIAGNOSIS — R73.03 PREDIABETES: ICD-10-CM

## 2022-10-12 DIAGNOSIS — R79.89 ELEVATED FERRITIN LEVEL: Chronic | ICD-10-CM

## 2022-10-12 DIAGNOSIS — R74.8 ELEVATED CPK: ICD-10-CM

## 2022-10-12 LAB
ALBUMIN SERPL BCP-MCNC: 5 G/DL (ref 3.2–4.9)
ALBUMIN/GLOB SERPL: 1.8 G/DL
ALP SERPL-CCNC: 50 U/L (ref 30–99)
ALT SERPL-CCNC: 25 U/L (ref 2–50)
ANION GAP SERPL CALC-SCNC: 15 MMOL/L (ref 7–16)
AST SERPL-CCNC: 25 U/L (ref 12–45)
BASOPHILS # BLD AUTO: 1 % (ref 0–1.8)
BASOPHILS # BLD: 0.06 K/UL (ref 0–0.12)
BILIRUB SERPL-MCNC: 0.3 MG/DL (ref 0.1–1.5)
BUN SERPL-MCNC: 23 MG/DL (ref 8–22)
CALCIUM SERPL-MCNC: 9.5 MG/DL (ref 8.5–10.5)
CHLORIDE SERPL-SCNC: 103 MMOL/L (ref 96–112)
CK SERPL-CCNC: 361 U/L (ref 0–154)
CO2 SERPL-SCNC: 21 MMOL/L (ref 20–33)
CREAT SERPL-MCNC: 1.05 MG/DL (ref 0.5–1.4)
EOSINOPHIL # BLD AUTO: 0.15 K/UL (ref 0–0.51)
EOSINOPHIL NFR BLD: 2.6 % (ref 0–6.9)
ERYTHROCYTE [DISTWIDTH] IN BLOOD BY AUTOMATED COUNT: 40.3 FL (ref 35.9–50)
FERRITIN SERPL-MCNC: 605 NG/ML (ref 22–322)
GFR SERPLBLD CREATININE-BSD FMLA CKD-EPI: 97 ML/MIN/1.73 M 2
GLOBULIN SER CALC-MCNC: 2.8 G/DL (ref 1.9–3.5)
GLUCOSE SERPL-MCNC: 84 MG/DL (ref 65–99)
HCT VFR BLD AUTO: 42.9 % (ref 42–52)
HGB BLD-MCNC: 14 G/DL (ref 14–18)
IMM GRANULOCYTES # BLD AUTO: 0.01 K/UL (ref 0–0.11)
IMM GRANULOCYTES NFR BLD AUTO: 0.2 % (ref 0–0.9)
LYMPHOCYTES # BLD AUTO: 2.02 K/UL (ref 1–4.8)
LYMPHOCYTES NFR BLD: 34.5 % (ref 22–41)
MCH RBC QN AUTO: 28.1 PG (ref 27–33)
MCHC RBC AUTO-ENTMCNC: 32.6 G/DL (ref 33.7–35.3)
MCV RBC AUTO: 86 FL (ref 81.4–97.8)
MONOCYTES # BLD AUTO: 0.46 K/UL (ref 0–0.85)
MONOCYTES NFR BLD AUTO: 7.8 % (ref 0–13.4)
NEUTROPHILS # BLD AUTO: 3.16 K/UL (ref 1.82–7.42)
NEUTROPHILS NFR BLD: 53.9 % (ref 44–72)
NRBC # BLD AUTO: 0 K/UL
NRBC BLD-RTO: 0 /100 WBC
PLATELET # BLD AUTO: 304 K/UL (ref 164–446)
PMV BLD AUTO: 9.2 FL (ref 9–12.9)
POTASSIUM SERPL-SCNC: 3.9 MMOL/L (ref 3.6–5.5)
PROT SERPL-MCNC: 7.8 G/DL (ref 6–8.2)
RBC # BLD AUTO: 4.99 M/UL (ref 4.7–6.1)
SODIUM SERPL-SCNC: 139 MMOL/L (ref 135–145)
WBC # BLD AUTO: 5.9 K/UL (ref 4.8–10.8)

## 2022-10-12 PROCEDURE — 99214 OFFICE O/P EST MOD 30 MIN: CPT | Performed by: NURSE PRACTITIONER

## 2022-10-12 PROCEDURE — 99212 OFFICE O/P EST SF 10 MIN: CPT | Performed by: NURSE PRACTITIONER

## 2022-10-12 PROCEDURE — 85025 COMPLETE CBC W/AUTO DIFF WBC: CPT

## 2022-10-12 PROCEDURE — 82550 ASSAY OF CK (CPK): CPT

## 2022-10-12 PROCEDURE — 80053 COMPREHEN METABOLIC PANEL: CPT

## 2022-10-12 PROCEDURE — 82728 ASSAY OF FERRITIN: CPT

## 2022-10-12 PROCEDURE — 36415 COLL VENOUS BLD VENIPUNCTURE: CPT | Performed by: NURSE PRACTITIONER

## 2022-10-12 ASSESSMENT — ENCOUNTER SYMPTOMS
PALPITATIONS: 0
NAUSEA: 0
FEVER: 0
COUGH: 0
CONSTIPATION: 0
DIZZINESS: 0
DIARRHEA: 1
WEIGHT LOSS: 0
TINGLING: 0
WHEEZING: 0
MYALGIAS: 1
CHILLS: 0
HEADACHES: 1
SHORTNESS OF BREATH: 0
VOMITING: 0
INSOMNIA: 0

## 2022-10-12 ASSESSMENT — FIBROSIS 4 INDEX: FIB4 SCORE: 0.63

## 2022-10-12 NOTE — PROGRESS NOTES
Subjective     Demetrius Christie is a 32 y.o. male who presents with Hemochromatosis (Nobile/Stickney/ 6mo fv labs prior)            HPI  Mr. Christie presents for evaluation of hemochromatosis/elevated ferritin. He has declined interpretor services, his wife is present and with serve as interpretor.      Patient was referred per PCP in 11/2020 for further evaluation of elevated ferritin, without obvious cause, following significant URI/pneumonia: Hematology workup was facilitated by Dr. Buchanan and included: elevated ferritin and CPK; negative hemochromatosis (C2A2Y, H63D, S65C negative); normal TIBC; normal haptoglobin and normal C-reactive protein; normal sed rate; negative BRYANT and Lupus anticoagulant; normal myoglobin and carnitine panel, normal TSH. Patient is a non-smoker and does not take supplemental testosterone, which is low normal. Ferritin continues to remain stably elevated, possibly reactive to elevated CPK. TP has not been completed or indicated to date, suspect reactive to elevated CPK, patient continues with routine surveillance.     Patient continues with elevated CPK since viral illness in Fall 2020. In addition to above testing, he has been evaluated by Neurology, Dr. Amato, on 9/28/21, with negative workup per review of records and so EMG was not indicated; CK isoenzymes were normal. Sleep study indicated moderate obstructive apnea with CPAP receipt still pending. He has been evaluated by Sleep Medicine with moderate obstructive sleep apnea noted, CPAP in use but he has trouble tolerating. Cardiology consultation completed per Dr. Rodriguez 9/9/21, patient advised to follow up to review results of Kardia device, which still remains pending.     Patient with mildly improved generalized fatigue and myalgia that correlates with change in job. 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 He is otherwise at his baseline and feels better overall with improved food intake and hydration  "related to job change.      Review of Systems   Constitutional:  Positive for malaise/fatigue (last 6 months is better overall; switched jobs x 1 month (indoors & more controlled w/ consistent breaks and meals)). Negative for chills, fever (\"warm feeling for several days\" correlates with sore throat nd malaise then resolves after a few days) and weight loss.   Respiratory:  Negative for cough, shortness of breath and wheezing.         S/p pulm visit but has not started meds yet - wife will seek further clarification before starting; sleep apnea dx (CPAP now) - hard to use for the entire night, is having some trouble with the humidifier   Cardiovascular:  Negative for chest pain, palpitations and leg swelling.   Gastrointestinal:  Positive for diarrhea (softer stools with better hydration). Negative for constipation, nausea and vomiting.   Genitourinary:  Negative for dysuria.   Musculoskeletal:  Positive for myalgias (better with new job). Negative for joint pain.   Neurological:  Positive for headaches (increases with tired or when allergies are acting up). Negative for dizziness and tingling.   Psychiatric/Behavioral:  The patient does not have insomnia.        No Known Allergies      Current Outpatient Medications on File Prior to Encounter   Medication Sig Dispense Refill    cetirizine (ZYRTEC) 10 MG Tab Take 10 mg by mouth every day.      pantoprazole (PROTONIX) 20 MG tablet Take 1 Tablet by mouth every day. (Patient not taking: Reported on 10/12/2022) 30 Tablet 2    budesonide-formoterol (SYMBICORT) 160-4.5 MCG/ACT Aerosol Inhale 2 Puffs 2 times a day. Use spacer. Rinse mouth after each use. (Patient not taking: Reported on 10/12/2022) 2 Each 2    albuterol 108 (90 Base) MCG/ACT Aero Soln inhalation aerosol Inhale 2 Puffs every 6 hours as needed for Shortness of Breath. (Patient not taking: Reported on 10/12/2022) 8.5 g 6    Omega-3 Fatty Acids (FISH OIL) 1000 MG Cap capsule Take 1,000 mg by mouth 3 times a " day with meals. (Patient not taking: Reported on 10/12/2022)      CINNAMON PO Take  by mouth. (Patient not taking: Reported on 10/12/2022)       No current facility-administered medications on file prior to encounter.              Objective     /60 (BP Location: Right arm, Patient Position: Sitting, BP Cuff Size: Adult)   Pulse 94   Temp 36.9 °C (98.4 °F) (Temporal)   Resp 14   Ht 1.829 m (6')   Wt 84.1 kg (185 lb 6.4 oz)   SpO2 96%   BMI 25.14 kg/m²      Physical Exam  Vitals reviewed.   Constitutional:       General: He is not in acute distress.     Appearance: He is well-developed. He is not diaphoretic.   HENT:      Head: Normocephalic and atraumatic.   Eyes:      General: No scleral icterus.        Right eye: No discharge.         Left eye: No discharge.   Cardiovascular:      Rate and Rhythm: Normal rate and regular rhythm.      Heart sounds: Normal heart sounds. No murmur heard.    No friction rub. No gallop.   Pulmonary:      Effort: Pulmonary effort is normal. No respiratory distress.      Breath sounds: Normal breath sounds. No wheezing.   Abdominal:      General: There is no distension.      Palpations: Abdomen is soft.      Tenderness: There is no abdominal tenderness.   Musculoskeletal:         General: Tenderness (L shoulder) present. Normal range of motion.      Cervical back: Normal range of motion.   Skin:     General: Skin is warm and dry.      Coloration: Skin is not pale.      Findings: No erythema or rash.   Neurological:      Mental Status: He is alert and oriented to person, place, and time.   Psychiatric:         Behavior: Behavior normal.     Hospital Outpatient Visit on 10/12/2022   Component Date Value Ref Range Status    CPK Total 10/12/2022 361 (A)  0 - 154 U/L Final    Sodium 10/12/2022 139  135 - 145 mmol/L Final    Potassium 10/12/2022 3.9  3.6 - 5.5 mmol/L Final    Chloride 10/12/2022 103  96 - 112 mmol/L Final    Co2 10/12/2022 21  20 - 33 mmol/L Final    Anion Gap  10/12/2022 15.0  7.0 - 16.0 Final    Glucose 10/12/2022 84  65 - 99 mg/dL Final    Bun 10/12/2022 23 (A)  8 - 22 mg/dL Final    Creatinine 10/12/2022 1.05  0.50 - 1.40 mg/dL Final    Calcium 10/12/2022 9.5  8.5 - 10.5 mg/dL Final    AST(SGOT) 10/12/2022 25  12 - 45 U/L Final    ALT(SGPT) 10/12/2022 25  2 - 50 U/L Final    Alkaline Phosphatase 10/12/2022 50  30 - 99 U/L Final    Total Bilirubin 10/12/2022 0.3  0.1 - 1.5 mg/dL Final    Albumin 10/12/2022 5.0 (A)  3.2 - 4.9 g/dL Final    Total Protein 10/12/2022 7.8  6.0 - 8.2 g/dL Final    Globulin 10/12/2022 2.8  1.9 - 3.5 g/dL Final    A-G Ratio 10/12/2022 1.8  g/dL Final    Ferritin 10/12/2022 605.0 (A)  22.0 - 322.0 ng/mL Final    WBC 10/12/2022 5.9  4.8 - 10.8 K/uL Final    RBC 10/12/2022 4.99  4.70 - 6.10 M/uL Final    Hemoglobin 10/12/2022 14.0  14.0 - 18.0 g/dL Final    Hematocrit 10/12/2022 42.9  42.0 - 52.0 % Final    MCV 10/12/2022 86.0  81.4 - 97.8 fL Final    MCH 10/12/2022 28.1  27.0 - 33.0 pg Final    MCHC 10/12/2022 32.6 (A)  33.7 - 35.3 g/dL Final    RDW 10/12/2022 40.3  35.9 - 50.0 fL Final    Platelet Count 10/12/2022 304  164 - 446 K/uL Final    MPV 10/12/2022 9.2  9.0 - 12.9 fL Final    Neutrophils-Polys 10/12/2022 53.90  44.00 - 72.00 % Final    Lymphocytes 10/12/2022 34.50  22.00 - 41.00 % Final    Monocytes 10/12/2022 7.80  0.00 - 13.40 % Final    Eosinophils 10/12/2022 2.60  0.00 - 6.90 % Final    Basophils 10/12/2022 1.00  0.00 - 1.80 % Final    Immature Granulocytes 10/12/2022 0.20  0.00 - 0.90 % Final    Nucleated RBC 10/12/2022 0.00  /100 WBC Final    Neutrophils (Absolute) 10/12/2022 3.16  1.82 - 7.42 K/uL Final    Includes immature neutrophils, if present.    Lymphs (Absolute) 10/12/2022 2.02  1.00 - 4.80 K/uL Final    Monos (Absolute) 10/12/2022 0.46  0.00 - 0.85 K/uL Final    Eos (Absolute) 10/12/2022 0.15  0.00 - 0.51 K/uL Final    Baso (Absolute) 10/12/2022 0.06  0.00 - 0.12 K/uL Final    Immature Granulocytes (abs) 10/12/2022 0.01   0.00 - 0.11 K/uL Final    NRBC (Absolute) 10/12/2022 0.00  K/uL Final    GFR (CKD-EPI) 10/12/2022 97  >60 mL/min/1.73 m 2 Final    Comment: Estimated Glomerular Filtration Rate is calculated using  race neutral CKD-EPI 2021 equation per NKF-ASN recommendations.          Latest Reference Range & Units 10/5/20 21:00 11/18/20 08:21 2/5/21 08:43 6/11/21 14:02 7/17/21 11:17 10/4/21 06:37 4/7/22 06:51 10/12/22 16:28   Ferritin 22.0 - 322.0 ng/mL 718.0 (H) 723.0 (H) 653.0 (H) 510.0 (H) 445.0 (H) 439.0 (H) 492.0 (H) 605.0 (H)   (H): Data is abnormally high     Latest Reference Range & Units 10/5/20 21:00 6/11/21 14:02 9/8/21 06:32 4/7/22 06:51 10/12/22 16:28   CPK Total 0 - 154 U/L 199 (H) 442 (H) 302 (H)  303 (H) 527 (H) 361 (H)   (H): Data is abnormally high           Assessment & Plan     1. Elevated ferritin level  CBC WITH DIFFERENTIAL    FERRITIN    CREATINE KINASE    Comp Metabolic Panel    FERRITIN      2. Elevated CPK  CBC WITH DIFFERENTIAL    FERRITIN    CREATINE KINASE    Comp Metabolic Panel      3. Prediabetes  CBC WITH DIFFERENTIAL    Comp Metabolic Panel    FERRITIN    CREATINE KINASE            1.  Elevated CPK: Chronic, improved with job change. Has been evaluated by Neurology and cardiology with no concerning findings noted per respective workups.     2.  Elevated Ferritin: Negative for hematochromatosis. CBC is essentially normal and stable, ferritin is increased this visit but patient remains asymptomatic, TP not yet indicated given variability.     We will continue to monitor, patient will repeat CBC, ferritin, CPK, CMP in 6 months and return for reevaluation, sooner as needed.      The patient verbalized agreement and understanding of current plan. All questions and concerns were addressed at time of visit.    Please note that this dictation was created using voice recognition software. I have made every reasonable attempt to correct obvious errors, but I expect that there are errors of grammar and  possibly content that I did not discover before finalizing the note.

## 2022-10-12 NOTE — PROGRESS NOTES
KYLAH Christie is a 32 y.o. male here for a provider visit for: Lab Draws  on 10/12/2022 at 4:56 PM    Procedure Performed: Venipuncture     Anatomical site: Right Antecubital Area (AC)    Equipment used: 23g butterfly    Labs drawn: CBC w/diff, CMP, Ferritin, and Creatine Kinase    Ordering Provider: JERILYN Gaviria    Seng By: Sherlyn Alba, Med Ass't

## 2022-12-20 ENCOUNTER — NON-PROVIDER VISIT (OUTPATIENT)
Dept: SLEEP MEDICINE | Facility: MEDICAL CENTER | Age: 32
End: 2022-12-20
Attending: INTERNAL MEDICINE
Payer: COMMERCIAL

## 2022-12-20 ENCOUNTER — OFFICE VISIT (OUTPATIENT)
Dept: SLEEP MEDICINE | Facility: MEDICAL CENTER | Age: 32
End: 2022-12-20
Payer: COMMERCIAL

## 2022-12-20 VITALS
OXYGEN SATURATION: 98 % | SYSTOLIC BLOOD PRESSURE: 120 MMHG | BODY MASS INDEX: 24.52 KG/M2 | WEIGHT: 181 LBS | DIASTOLIC BLOOD PRESSURE: 70 MMHG | HEART RATE: 80 BPM | HEIGHT: 72 IN

## 2022-12-20 VITALS — BODY MASS INDEX: 24.54 KG/M2 | WEIGHT: 181.2 LBS | HEIGHT: 72 IN

## 2022-12-20 DIAGNOSIS — K21.9 GASTROESOPHAGEAL REFLUX DISEASE, UNSPECIFIED WHETHER ESOPHAGITIS PRESENT: ICD-10-CM

## 2022-12-20 DIAGNOSIS — J30.1 SEASONAL ALLERGIC RHINITIS DUE TO POLLEN: ICD-10-CM

## 2022-12-20 DIAGNOSIS — R06.02 SOB (SHORTNESS OF BREATH): ICD-10-CM

## 2022-12-20 DIAGNOSIS — R06.2 WHEEZING: ICD-10-CM

## 2022-12-20 DIAGNOSIS — J45.40 MODERATE PERSISTENT REACTIVE AIRWAY DISEASE WITHOUT COMPLICATION: ICD-10-CM

## 2022-12-20 PROCEDURE — 99214 OFFICE O/P EST MOD 30 MIN: CPT | Performed by: INTERNAL MEDICINE

## 2022-12-20 PROCEDURE — 94726 PLETHYSMOGRAPHY LUNG VOLUMES: CPT | Performed by: INTERNAL MEDICINE

## 2022-12-20 PROCEDURE — 99999 PR NO CHARGE: CPT | Performed by: INTERNAL MEDICINE

## 2022-12-20 PROCEDURE — 94060 EVALUATION OF WHEEZING: CPT | Performed by: INTERNAL MEDICINE

## 2022-12-20 PROCEDURE — 94729 DIFFUSING CAPACITY: CPT | Performed by: INTERNAL MEDICINE

## 2022-12-20 ASSESSMENT — PULMONARY FUNCTION TESTS
FVC_PERCENT_PREDICTED: 102
FVC_PREDICTED: 5.35
FEV1_LLN: 3.70
FEV1/FVC: 82
FEV1_PERCENT_PREDICTED: 104
FEV1: 4.37
FEV1/FVC_PERCENT_PREDICTED: 99
FEV1/FVC_PERCENT_PREDICTED: 96
FVC_PERCENT_PREDICTED: 105
FEV1_PERCENT_CHANGE: 3
FEV1/FVC_PREDICTED: 83
FEV1_PERCENT_CHANGE: 6
FEV1_PREDICTED: 4.43
FEV1/FVC_PERCENT_PREDICTED: 83
FVC: 5.66
FEV1/FVC_PERCENT_PREDICTED: 98
FEV1/FVC: 81.8
FEV1/FVC_PERCENT_CHANGE: 200
FEV1/FVC_PERCENT_PREDICTED: 95
FVC_LLN: 4.47
FEV1_PERCENT_PREDICTED: 98
FEV1/FVC_PERCENT_LLN: 69
FEV1/FVC: 79
FEV1/FVC_PERCENT_CHANGE: 2
FVC: 5.5
FEV1/FVC: 79
FEV1: 4.63

## 2022-12-20 ASSESSMENT — FIBROSIS 4 INDEX
FIB4 SCORE: 0.51
FIB4 SCORE: 0.51

## 2022-12-20 NOTE — PROGRESS NOTES
"Pulmonary Clinic Office Note    Reason for visit: \"SOB\"    Chart reviewed prior to patient interview.    KYLAH Christie is a 32 y.o. year old male, with a PMHx of VERITO  who presented to the Pulmonary Clinic for a SOB and a new referral.    Background:  Was evaluated by sleep medicine and found to have VERITO. He was started on cpap and on current treatment. Also had a hx of cPK elevation.  He started feeling unwell since 2020, went to the hospital one time.     Today:  Patient speaks mandarin only and this visit was performed with a  via ipad.   He reports symptoms of since Admited for pneumonia in 2020, since then has been having SOB and ADHIKARI, reports that his breathing was never back to his baseline.   After that admission has difficulties to recover from any cold.   He describes intermitent chest pain with GERD symptoms, no meds  Reports wheezing.  He was advised to be empirically treated with a maintenance inhaler but he was not compliant with this, when asked why he stopped he reports that his wife told him to stop it.  He apparently never use it as prescribed but as needed for a very short period of time.  He continues to ask today why his short of breath and having dyspnea and he believes that that is not a symptom from asthma.  No history of respiratory failure requiring mechanical ventilation  No history of hemoptysis.  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  Pet(s) exposure: 1 dog.  TB exposure: none    MMRC Dyspnea Scale  Grade  Description of Breathlessness   0  I only get breathless with strenuous exercise.   1  I get short of breath when hurrying on level ground or walking up a slight hill.   2  On level ground, I walk slower than people of the same age because of breathlessness, or have to stop for breath when walking at my own pace.   3  I stop for breath after walking about 100 yards or after a few minutes on level ground.   4  I am too breathless to leave the house or I am breathless when dressing.       COVID vaccine: 3 doses   Pneumovax:  none  Hospitalizations due to the main problem address today: as above.  Never intubated for asthma.    ----------------------------------------------------------------------------------------------------------------------------------------------------------------------------------------------------------------------------------------------------------------  Past Medical History:   Diagnosis Date    Allergy to pollen 2014    Anemia 11/13/2020    Hg 12 Ordered ffib, Hg electro    Bleeding nose 1999    Heart palpitations 1998    Wrist fracture, bilateral      No Known Allergies  Family History   Problem Relation Age of Onset    Arrythmia Mother         had ablation procedure 2017     History reviewed. No pertinent surgical history.  Social History     Tobacco Use    Smoking status: Never    Smokeless tobacco: Never   Vaping Use    Vaping Use: Never used   Substance Use Topics    Alcohol use: Yes     Comment: 1 - 2 beers daily     Drug use: No         Review of Systems (Positive in Bold, otherwise negative)    Constitutional: fever, chills, night sweats, weightloss  HEENT: headaches, migraines, vision changes, blurred vision, dry eyes,  changes in hearing, rhinorrhea, sinus congestion, dysphagia  Hoarseness of voice, choking  CV:  chest pain, ADHIKARI, orthopnea, edema, palpitations  Resp: SOB, cough, hemoptysis, asthma, repeated infections, sputum production, wheezing  GI: changes in appetite, nausea, vomiting, diarrhea, constipation, pain, GERD  : Dysuria, hematuria, nocturia  Lymph: swollen glands  MSK: Muscle weakness, wasting, arthralgia, myalgia  Neuro/Psych: Sensory disturbances, seizures, syncope, anxiety, depression    Objective:  Vitals: /70 (BP Location: Right arm, Patient Position: Sitting, BP Cuff Size: Adult)   Pulse 80   Ht 1.829 m (6')   Wt 82.1 kg (181 lb)   SpO2 98% Comment: room air at rest  BMI 24.55 kg/m²     Wt Readings from Last 3 Encounters:   12/20/22 82.1 kg (181 lb)   12/20/22 82.2 kg (181 lb 3.2 oz)   10/12/22 84.1 kg (185 lb 6.4 oz)     Gen: AAO x 3, appears of stated age, well-nourished and in NAD  Eyes: sclerae anicteric, conjunctivae appear normal, PEERLA, EOM in tact  ENT: EAC appears normal w/o erythema/exudate.  Neck: Supple w/o evidence of LAD, thyroid normal and size and w/o nodularity  CV: RRR w/o murmurs, rubs, gallops. Normal S1/S2. No peripheral edema orJVD.  Lungs: CTAB w/o wheezing, rales, rhonchi. Good air entry, normal chest excursion.  GI: Soft and non-tender, + BS x 4. No rebound or guarding.  Extremities: +2/4 bilateral pedal pulses, cool and dry, no edema.  MS: +5/5 bilateral UE/LE muscle strength testing, no obvious joint deformities, swelling noted, good overall muscle tone  Neuro: No focal neurological deficits    ----------------------------------------------------------------------------------------------------------------------------------------------------------------------------------------------------------------------------------------------------------------  Labs, Imaging & Scoring Systems:    Lab results since patient's previous encounter were reviewed with the patient.  Pertinent results discussed below or included within the note.    PFTs (Date: 12/20/22)-  The results of  this test meet the criteria for acceptability and repeatability by the ATS.  SPIROMETRY:  There is no evidence of obstruction in this test manifested by an FEV1/FVC ratio of 79%.  Noted that FEV1 was 104% ( 4.63 lt) of predicted on the post bronchodilator arm.  There was no significant response to bronchodilators in this test, although this does not preclude the patient from receiving treatment with them.    LUNG VOLUMES:  The lung volumes are within normal limits, evidenced by a TLC of 95% ( 6.95 lt)  There is no hyperinflation and no air trapping, manifested by an RV of 77% and TLC as above.    DIFFUSION  There is no impairment of the diffusion capacity in this test manifested by a DLCO of 91%.     FLOW-VOLUME GRAPHICS/LOOPS:  The flow volume curves correlates with information above.    MIP/MEP:   Special testing was not performed.    CONCLUSION:   Normal pulmonary function test        Sleep study 11/24/21   1.Moderate obstructive sleep apnea with  Respiratory Event Index (CARIE) of 25.5 per hour and worse in supine sleep with CARIE at 36. These findings are based on the recording time (flow evaluation time). It is not possible with this device to determine a traditional apnea+hypopnea index (AHI) for total sleep time since EEG channels are not available.    2. Oxygenation O2 Sat. antonella was 71% and mean O2 sat was 94% and baseline O2 at 96 %. O2 sat was below 88% for 10 minutes of the flow evaluation time. Oxygen Desaturation (>=3%) Index was elevated at 24.4/hr. AVG HR was 73 BPM.  Recommendations:   1. CPAP titration study vs Auto CPAP trial .   2.    In general patients with sleep apnea are advised to avoid alcohol and sedatives and to not operate a motor vehicle while drowsy. In some cases alternative treatment options may prove effective in resolving sleep apnea in these options include upper airway surgery, the use of a dental orthotic or weight loss and positional therapy. Clinical correlation is  required.      CXR: (Date: 10/5/2020)-  Bilateral patchy opacities are consistent with atypical infection, possibly Covid 19    CT Chest: (Date: 5/17/22)-  Lungs: Minimal bilateral dependent atelectasis.   Pleura: No pleural effusion.   Mediastinum/Roselyn: No significant adenopathy.   Cardiac: Heart normal in size without pericardial effusion.  1.  No evidence for acute intrathoracic injury.  2.  No evidence for acute intra-abdominal trauma.    ECHO, (date: none)    Allergen panel 9/13/22    ----------------------------------------------------------------------------------------------------------------------------------------------------------------------------------------------------------------------------------------------------------------      Impression:    VERITO  Shortness of breath/dyspnea  Suspected Asthma vs RAD  GERD  Noncompliance      Discussion:    KYLAH Christie is a 32 y.o. with GERD, VERITO, wheezing, SoB suspected for asthma. He has not been compliant with inhalers, he stopped the trial of symbicort due to his wife told him to stop. We discussed a methacholine challenge test to fully determined if he has asthma or not.  There are multiple barriers to treat this gentleman and I believe one of them is language, despite using an  I am not sure that he is fully understanding his symptoms, the process of treatment,/management/diagnostic work-up.  Other barriers identified today was noncompliance: He is not using nor used daily inhalers as prescribed. I offer him different options including a methacholine challenge test versus an empiric treatment with the bronchodilators to be used as prescribed but he decided to do the methacholine challenge test.    Plan:  Continue follow up with Dr Dela Cruz for sleep apnea management  We discussed in greater detail the results of the pulmonary function test.  Which in essence were within normal limits   Allergen test as above reported.  Recommend to empiricaly start  "symbicort scheduled and prn albuterol and see if this improves his symptoms but patient want a wait for the methacholine challenge test..  GERD recommendations: keep head of the bed elevated at 30 degrees, avoid eating anything 2 hours prior to going to bed, avoid cafeinated drinks when possible even during the day. Try to avoid the following but if you have to have it, you can try to enjoy cafeinated drinks, tea, chocolate (either drink of bar-food) or carbonated drinks in the first half of the day but please avoid it after 2-3pm.   Continue with a PPI  Recommend to wear a mask at work      * Patient Education                         - Educated the patient on his/her disease processes                         - Answered all questions to the patients satisfaction.                         - Reminded the patient to bring all of his/her medication bottles to the next office visit.                           * Return To Clinic:  3 months or PRN      The patient voiced understanding of the above treatment approach and agreed with the direction of care. The patient was educated about their conditions and all questions were addressed during this encounter. I have also reminded the patient to bring all of their medications to the next office visit.  KYLAH Christie was instructed to call the office if any questions or concerns arise prior to their next appointment.  This note reflects my clinical thought processes and is intended primarily for the exchange of information between healthcare providers.  It is not written primarily to communicate with the patient or family directly, which takes place in-person in clinic, by phone/virtual visits or the bedside.  This note might contain sensitive information, including substance use, mental health and consideration of sensitive, serious or other \"do-not-miss\" diagnoses, which is further discussed at the bedside.    Margarito Sweeney MD FACP  Pulmonary/Critical Care       "

## 2022-12-20 NOTE — PATIENT INSTRUCTIONS
Thanks for coming to the office today.  Please bring all of your medication bottles to the next office visit, specially inhalers.  If requested, please obtain the prior records from your lung doctor.  GERD recommendations: keep head of the bed elevated at 30 degrees, avoid eating anything 2 hours prior to going to bed, avoid cafeinated drinks when possible even during the day. Try to avoid the following but if you have to have it, you can try to enjoy cafeinated drinks, tea, chocolate (either drink of bar-food) or carbonated drinks in the first half of the day but please avoid it after 2-3pm.   Please schedule your methacholine challenge test.  If you have been prescribed inhalers, please use them as indicated and please rinse your mouth after using them each time.  Call if any questions!                    Return To Clinic:  3 months. Please do not forget to come at least 20 minutes prior to your appointment.  It has been a pleasure seeing you today.    Margarito Sweeney MD  Pulmonary/Critical Care

## 2022-12-20 NOTE — PROCEDURES
Tech: Keyana Mckeon, RT  Good patient effort & cooperation.  Test was performed on the [employer] Body Plethysmograph- Elite DX system.  The predicted sets used for Spirometry are GLI-2012, for Lung Volumes are ITS, and for DLCO is GLI 2017.  The results of this test meet the ATS standards for acceptability and repeatability.  The DLCO was uncorrected for Hb.  A bronchodilator of Ventolin HFA 2 puffs via spacer was administered.  DLCO was performed during dilation period.    Interpretation:     PFTs (Date: 12/20/22)-  The results of this test meet the criteria for acceptability and repeatability by the ATS.  SPIROMETRY:  There is no evidence of obstruction in this test manifested by an FEV1/FVC ratio of 79%.  Noted that FEV1 was 104% ( 4.63 lt) of predicted on the post bronchodilator arm.  There was no significant response to bronchodilators in this test, although this does not preclude the patient from receiving treatment with them.    LUNG VOLUMES:  The lung volumes are within normal limits, evidenced by a TLC of 95% ( 6.95 lt)  There is no hyperinflation and no air trapping, manifested by an RV of 77% and TLC as above.    DIFFUSION  There is no impairment of the diffusion capacity in this test manifested by a DLCO of 91%.     FLOW-VOLUME GRAPHICS/LOOPS:  The flow volume curves correlates with information above.    MIP/MEP:   Special testing was not performed.    CONCLUSION:   Normal pulmonary function test    Margarito Sweeney MD, FACP  Pulmonary/Critical Care  ---------------------------------------------------------------------------------------------------------  I, Margarito Sweeney MD, FACP, is the author of this note (interpretation of PFT)

## 2023-01-27 ENCOUNTER — APPOINTMENT (OUTPATIENT)
Dept: PULMONOLOGY | Facility: MEDICAL CENTER | Age: 33
End: 2023-01-27
Payer: COMMERCIAL

## 2023-02-22 ENCOUNTER — APPOINTMENT (OUTPATIENT)
Dept: PULMONOLOGY | Facility: MEDICAL CENTER | Age: 33
End: 2023-02-22
Payer: COMMERCIAL

## 2023-02-24 ENCOUNTER — APPOINTMENT (OUTPATIENT)
Dept: PULMONOLOGY | Facility: MEDICAL CENTER | Age: 33
End: 2023-02-24

## 2023-04-10 ENCOUNTER — HOSPITAL ENCOUNTER (OUTPATIENT)
Dept: LAB | Facility: MEDICAL CENTER | Age: 33
End: 2023-04-10
Attending: NURSE PRACTITIONER
Payer: COMMERCIAL

## 2023-04-10 DIAGNOSIS — R74.8 ELEVATED CPK: ICD-10-CM

## 2023-04-10 DIAGNOSIS — R79.89 ELEVATED FERRITIN LEVEL: Chronic | ICD-10-CM

## 2023-04-10 LAB
ALBUMIN SERPL BCP-MCNC: 4.5 G/DL (ref 3.2–4.9)
ALBUMIN/GLOB SERPL: 1.4 G/DL
ALP SERPL-CCNC: 47 U/L (ref 30–99)
ALT SERPL-CCNC: 23 U/L (ref 2–50)
ANION GAP SERPL CALC-SCNC: 14 MMOL/L (ref 7–16)
AST SERPL-CCNC: 20 U/L (ref 12–45)
BILIRUB SERPL-MCNC: 0.2 MG/DL (ref 0.1–1.5)
BUN SERPL-MCNC: 19 MG/DL (ref 8–22)
CALCIUM ALBUM COR SERPL-MCNC: 8.7 MG/DL (ref 8.5–10.5)
CALCIUM SERPL-MCNC: 9.1 MG/DL (ref 8.5–10.5)
CHLORIDE SERPL-SCNC: 108 MMOL/L (ref 96–112)
CK SERPL-CCNC: 244 U/L (ref 0–154)
CO2 SERPL-SCNC: 20 MMOL/L (ref 20–33)
CREAT SERPL-MCNC: 0.9 MG/DL (ref 0.5–1.4)
FERRITIN SERPL-MCNC: 502 NG/ML (ref 22–322)
GFR SERPLBLD CREATININE-BSD FMLA CKD-EPI: 116 ML/MIN/1.73 M 2
GLOBULIN SER CALC-MCNC: 3.2 G/DL (ref 1.9–3.5)
GLUCOSE SERPL-MCNC: 105 MG/DL (ref 65–99)
POTASSIUM SERPL-SCNC: 4 MMOL/L (ref 3.6–5.5)
PROT SERPL-MCNC: 7.7 G/DL (ref 6–8.2)
SODIUM SERPL-SCNC: 142 MMOL/L (ref 135–145)

## 2023-04-10 PROCEDURE — 80053 COMPREHEN METABOLIC PANEL: CPT

## 2023-04-10 PROCEDURE — 85025 COMPLETE CBC W/AUTO DIFF WBC: CPT

## 2023-04-10 PROCEDURE — 82728 ASSAY OF FERRITIN: CPT

## 2023-04-10 PROCEDURE — 36415 COLL VENOUS BLD VENIPUNCTURE: CPT

## 2023-04-10 PROCEDURE — 82550 ASSAY OF CK (CPK): CPT

## 2023-04-11 LAB
BASOPHILS # BLD AUTO: 0.9 % (ref 0–1.8)
BASOPHILS # BLD: 0.04 K/UL (ref 0–0.12)
EOSINOPHIL # BLD AUTO: 0.11 K/UL (ref 0–0.51)
EOSINOPHIL NFR BLD: 2.5 % (ref 0–6.9)
ERYTHROCYTE [DISTWIDTH] IN BLOOD BY AUTOMATED COUNT: 43.8 FL (ref 35.9–50)
HCT VFR BLD AUTO: 45.8 % (ref 42–52)
HGB BLD-MCNC: 14.2 G/DL (ref 14–18)
IMM GRANULOCYTES # BLD AUTO: 0.01 K/UL (ref 0–0.11)
IMM GRANULOCYTES NFR BLD AUTO: 0.2 % (ref 0–0.9)
LYMPHOCYTES # BLD AUTO: 1.91 K/UL (ref 1–4.8)
LYMPHOCYTES NFR BLD: 43.2 % (ref 22–41)
MCH RBC QN AUTO: 27.6 PG (ref 27–33)
MCHC RBC AUTO-ENTMCNC: 31 G/DL (ref 33.7–35.3)
MCV RBC AUTO: 89.1 FL (ref 81.4–97.8)
MONOCYTES # BLD AUTO: 0.68 K/UL (ref 0–0.85)
MONOCYTES NFR BLD AUTO: 15.4 % (ref 0–13.4)
NEUTROPHILS # BLD AUTO: 1.67 K/UL (ref 1.82–7.42)
NEUTROPHILS NFR BLD: 37.8 % (ref 44–72)
NRBC # BLD AUTO: 0 K/UL
NRBC BLD-RTO: 0 /100 WBC
PLATELET # BLD AUTO: 251 K/UL (ref 164–446)
PMV BLD AUTO: 9.6 FL (ref 9–12.9)
RBC # BLD AUTO: 5.14 M/UL (ref 4.7–6.1)
WBC # BLD AUTO: 4.4 K/UL (ref 4.8–10.8)

## 2023-04-12 ENCOUNTER — HOSPITAL ENCOUNTER (OUTPATIENT)
Dept: HEMATOLOGY ONCOLOGY | Facility: MEDICAL CENTER | Age: 33
End: 2023-04-12
Attending: NURSE PRACTITIONER
Payer: COMMERCIAL

## 2023-04-12 VITALS
TEMPERATURE: 99.4 F | WEIGHT: 181 LBS | SYSTOLIC BLOOD PRESSURE: 100 MMHG | DIASTOLIC BLOOD PRESSURE: 60 MMHG | OXYGEN SATURATION: 97 % | HEIGHT: 72 IN | BODY MASS INDEX: 24.52 KG/M2 | HEART RATE: 77 BPM

## 2023-04-12 DIAGNOSIS — R74.8 ELEVATED CPK: ICD-10-CM

## 2023-04-12 DIAGNOSIS — Z09 ENCOUNTER FOR HEMATOLOGY FOLLOW-UP: ICD-10-CM

## 2023-04-12 DIAGNOSIS — R79.89 ELEVATED FERRITIN LEVEL: Chronic | ICD-10-CM

## 2023-04-12 PROCEDURE — 99214 OFFICE O/P EST MOD 30 MIN: CPT | Performed by: NURSE PRACTITIONER

## 2023-04-12 PROCEDURE — 99212 OFFICE O/P EST SF 10 MIN: CPT | Performed by: NURSE PRACTITIONER

## 2023-04-12 ASSESSMENT — ENCOUNTER SYMPTOMS
DIZZINESS: 1
CONSTIPATION: 0
MYALGIAS: 0
WEIGHT LOSS: 0
PALPITATIONS: 0
SHORTNESS OF BREATH: 0
CHILLS: 0
DIARRHEA: 0
COUGH: 0
NAUSEA: 0
HEADACHES: 0
FEVER: 0
TINGLING: 0
VOMITING: 0
EYE PAIN: 1
INSOMNIA: 0
WHEEZING: 0

## 2023-04-12 ASSESSMENT — FIBROSIS 4 INDEX: FIB4 SCORE: 0.52

## 2023-04-12 ASSESSMENT — PATIENT HEALTH QUESTIONNAIRE - PHQ9: CLINICAL INTERPRETATION OF PHQ2 SCORE: 0

## 2023-04-12 NOTE — PROGRESS NOTES
Subjective     Demetrius Christie is a 32 y.o. male who presents with Follow-Up ([place]/ Elevated Ferritin Level/ 6mo fv labs prior)            HPI  Mr. Christie presents for evaluation of hemochromatosis/elevated ferritin. He has declined interpretor services, his wife is present and with serve as interpretor.      Patient was referred per PCP in 11/2020 for further evaluation of elevated ferritin, without obvious cause, following significant URI/pneumonia: Hematology workup was facilitated by Dr. Buchanan and included: elevated ferritin and CPK; negative hemochromatosis (C2A2Y, H63D, S65C negative); normal TIBC; normal haptoglobin and normal C-reactive protein; normal sed rate; negative BRYANT and Lupus anticoagulant; normal myoglobin and carnitine panel, normal TSH. Patient is a non-smoker and does not take supplemental testosterone, which is low normal. Ferritin continues to remain stably elevated, possibly reactive to elevated CPK. TP has not been completed or indicated to date, suspect reactive to elevated CPK, patient continues with routine surveillance.     Patient continues with elevated CPK since viral illness in Fall 2020. In addition to above testing, he has been evaluated by Neurology, Dr. Amato, on 9/28/21, with negative workup per review of records and so EMG was not indicated; CK isoenzymes were normal. Sleep study indicated moderate obstructive apnea with CPAP receipt still pending. He has been evaluated by Sleep Medicine with moderate obstructive sleep apnea noted, CPAP in use but he has trouble tolerating. Cardiology consultation completed per Dr. Rodriguez 9/9/21, which was ultimately negative.     Patient reports increase in allergies, affecting his eyes - eye pain improved after irrigation. Palpations have resolved,  his cardiac workup was negative. Diarrhea and abdominal discomfort resolved with addition of Lactaid and limiting milk products. He is working out again and denies muscle pain.      Review of  Systems   Constitutional:  Negative for chills, fever, malaise/fatigue and weight loss.   Eyes:  Positive for pain (last week, better after eye wash (right side)).   Respiratory:  Negative for cough, shortness of breath and wheezing.    Cardiovascular:  Negative for chest pain, palpitations and leg swelling.        Negative cardiac workup - palpitations have not occurred for awhile   Gastrointestinal:  Negative for constipation (Last BM this am), diarrhea (resolved with Lactaid), nausea and vomiting.   Genitourinary:  Negative for dysuria.   Musculoskeletal:  Negative for joint pain and myalgias.   Neurological:  Positive for dizziness (with changes and position). Negative for tingling and headaches.   Endo/Heme/Allergies:  Positive for environmental allergies (right  eye drainage more - zyrtec daily).   Psychiatric/Behavioral:  The patient does not have insomnia.          No Known Allergies      Current Outpatient Medications on File Prior to Encounter   Medication Sig Dispense Refill    cetirizine (ZYRTEC) 10 MG Tab Take 10 mg by mouth every day.      pantoprazole (PROTONIX) 20 MG tablet Take 1 Tablet by mouth every day. (Patient not taking: Reported on 10/12/2022) 30 Tablet 2    budesonide-formoterol (SYMBICORT) 160-4.5 MCG/ACT Aerosol Inhale 2 Puffs 2 times a day. Use spacer. Rinse mouth after each use. (Patient not taking: Reported on 10/12/2022) 2 Each 2    albuterol 108 (90 Base) MCG/ACT Aero Soln inhalation aerosol Inhale 2 Puffs every 6 hours as needed for Shortness of Breath. (Patient not taking: Reported on 10/12/2022) 8.5 g 6    Omega-3 Fatty Acids (FISH OIL) 1000 MG Cap capsule Take 1,000 mg by mouth 3 times a day with meals. (Patient not taking: Reported on 10/12/2022)      CINNAMON PO Take  by mouth. (Patient not taking: Reported on 10/12/2022)       No current facility-administered medications on file prior to encounter.              Objective     /60 (BP Location: Right arm, Patient Position:  Sitting, BP Cuff Size: Adult)   Pulse 77   Temp 37.4 °C (99.4 °F) (Temporal)   Ht 1.829 m (6')   Wt 82.1 kg (181 lb)   SpO2 97%   BMI 24.55 kg/m²      Physical Exam  Vitals reviewed.   Constitutional:       General: He is not in acute distress.     Appearance: He is well-developed. He is not diaphoretic.   HENT:      Head: Normocephalic and atraumatic.   Eyes:      General: No scleral icterus.        Right eye: No discharge.         Left eye: No discharge.   Cardiovascular:      Rate and Rhythm: Normal rate and regular rhythm.      Heart sounds: Normal heart sounds. No murmur heard.    No friction rub. No gallop.   Pulmonary:      Effort: Pulmonary effort is normal. No respiratory distress.      Breath sounds: Normal breath sounds. No wheezing.   Abdominal:      General: There is no distension.      Palpations: Abdomen is soft.      Tenderness: There is no abdominal tenderness.   Musculoskeletal:         General: Normal range of motion.      Cervical back: Normal range of motion.   Skin:     General: Skin is warm and dry.      Coloration: Skin is not pale.      Findings: No erythema or rash.   Neurological:      Mental Status: He is alert and oriented to person, place, and time.   Psychiatric:         Behavior: Behavior normal.        Latest Reference Range & Units 10/04/21 06:37 04/07/22 06:51 10/12/22 16:28 04/10/23 06:57   WBC 4.8 - 10.8 K/uL 6.2 5.6 5.9 4.4 (L)   RBC 4.70 - 6.10 M/uL 5.14 5.33 4.99 5.14   Hemoglobin 14.0 - 18.0 g/dL 14.3 14.7 14.0 14.2   Hematocrit 42.0 - 52.0 % 45.2 47.3 42.9 45.8   MCV 81.4 - 97.8 fL 87.9 88.7 86.0 89.1   MCH 27.0 - 33.0 pg 27.8 27.6 28.1 27.6   MCHC 33.7 - 35.3 g/dL 31.6 (L) 31.1 (L) 32.6 (L) 31.0 (L)   RDW 35.9 - 50.0 fL 42.9 42.9 40.3 43.8   Platelet Count 164 - 446 K/uL 278 291 304 251   MPV 9.0 - 12.9 fL 9.3 9.6 9.2 9.6   Neutrophils-Polys 44.00 - 72.00 % 46.70 49.10 53.90 37.80 (L)   Neutrophils (Absolute) 1.82 - 7.42 K/uL 2.91 2.72 3.16 1.67 (L)   Lymphocytes  22.00 - 41.00 % 42.70 (H) 40.50 34.50 43.20 (H)   Lymphs (Absolute) 1.00 - 4.80 K/uL 2.66 2.25 2.02 1.91   Monocytes 0.00 - 13.40 % 5.80 7.20 7.80 15.40 (H)   Monos (Absolute) 0.00 - 0.85 K/uL 0.36 0.40 0.46 0.68   Eosinophils 0.00 - 6.90 % 3.50 2.50 2.60 2.50   Eos (Absolute) 0.00 - 0.51 K/uL 0.22 0.14 0.15 0.11   Basophils 0.00 - 1.80 % 1.00 0.50 1.00 0.90   Baso (Absolute) 0.00 - 0.12 K/uL 0.06 0.03 0.06 0.04   Immature Granulocytes 0.00 - 0.90 % 0.30 0.20 0.20 0.20   Immature Granulocytes (abs) 0.00 - 0.11 K/uL 0.02 0.01 0.01 0.01   Nucleated RBC /100 WBC 0.00 0.00 0.00 0.00   NRBC (Absolute) K/uL 0.00 0.00 0.00 0.00   Sodium 135 - 145 mmol/L  140 139 142   Potassium 3.6 - 5.5 mmol/L  4.1 3.9 4.0   Chloride 96 - 112 mmol/L  103 103 108   Co2 20 - 33 mmol/L  25 21 20   Anion Gap 7.0 - 16.0   12.0 15.0 14.0   Glucose 65 - 99 mg/dL  88 84 105 (H)   Bun 8 - 22 mg/dL  18 23 (H) 19   Creatinine 0.50 - 1.40 mg/dL  0.84 1.05 0.90   GFR (CKD-EPI) >60 mL/min/1.73 m 2  120 97 116   Calcium 8.5 - 10.5 mg/dL  9.8 9.5 9.1   Correct Calcium 8.5 - 10.5 mg/dL    8.7   AST(SGOT) 12 - 45 U/L  27 25 20   ALT(SGPT) 2 - 50 U/L  21 25 23   Alkaline Phosphatase 30 - 99 U/L  50 50 47   Total Bilirubin 0.1 - 1.5 mg/dL  0.4 0.3 0.2   Albumin 3.2 - 4.9 g/dL  5.0 (H) 5.0 (H) 4.5   Total Protein 6.0 - 8.2 g/dL  7.7 7.8 7.7   Globulin 1.9 - 3.5 g/dL  2.7 2.8 3.2   A-G Ratio g/dL  1.9 1.8 1.4   CPK Total 0 - 154 U/L  527 (H) 361 (H) 244 (H)   POC Urine Drug Screen Comment        POC Breathalizer 0.00 - 0.01 Percent       Breath Alcohol Comment        Ferritin 22.0 - 322.0 ng/mL 439.0 (H) 492.0 (H) 605.0 (H) 502.0 (H)   EER Allergent Panel, IgE Immun DAVE        (L): Data is abnormally low  (H): Data is abnormally high             Assessment & Plan     1. Elevated ferritin level  CBC WITH DIFFERENTIAL    Comp Metabolic Panel    FERRITIN      2. Elevated CPK  CBC WITH DIFFERENTIAL    Comp Metabolic Panel    CREATINE KINASE      3. Encounter for  hematology follow-up            1.  Elevated CPK: Chronic, negative cardiology and neurology workups. Improving, continue with good hydration and gently exercise, continue to monitor.     2.  Elevated Ferritin: Negative for hematochromatosis. CBC is essentially normal and stable, ferritin is decreased this visit, TP is not indicated.     Patient will start B12 for additional red cell support. We will continue to monitor, patient will repeat CBC, ferritin, CPK, CMP in 6 months and return for reevaluation, sooner as needed.       The patient verbalized agreement and understanding of current plan. All questions and concerns were addressed at time of visit.    Please note that this dictation was created using voice recognition software. I have made every reasonable attempt to correct obvious errors, but I expect that there are errors of grammar and possibly content that I did not discover before finalizing the note.

## 2023-07-19 ENCOUNTER — SUPERVISING PHYSICIAN REVIEW (OUTPATIENT)
Dept: URGENT CARE | Facility: PHYSICIAN GROUP | Age: 33
End: 2023-07-19

## 2023-07-19 ENCOUNTER — OFFICE VISIT (OUTPATIENT)
Dept: URGENT CARE | Facility: PHYSICIAN GROUP | Age: 33
End: 2023-07-19
Payer: COMMERCIAL

## 2023-07-19 VITALS
RESPIRATION RATE: 18 BRPM | BODY MASS INDEX: 24.38 KG/M2 | WEIGHT: 180 LBS | SYSTOLIC BLOOD PRESSURE: 120 MMHG | DIASTOLIC BLOOD PRESSURE: 70 MMHG | TEMPERATURE: 98.2 F | HEIGHT: 72 IN | OXYGEN SATURATION: 96 % | HEART RATE: 87 BPM

## 2023-07-19 DIAGNOSIS — J02.0 STREPTOCOCCAL PHARYNGITIS: Primary | ICD-10-CM

## 2023-07-19 DIAGNOSIS — J02.9 PHARYNGITIS, UNSPECIFIED ETIOLOGY: ICD-10-CM

## 2023-07-19 LAB
FLUAV RNA SPEC QL NAA+PROBE: NEGATIVE
FLUBV RNA SPEC QL NAA+PROBE: NEGATIVE
RSV RNA SPEC QL NAA+PROBE: NEGATIVE
S PYO DNA SPEC NAA+PROBE: DETECTED
SARS-COV-2 RNA RESP QL NAA+PROBE: NEGATIVE

## 2023-07-19 PROCEDURE — 0241U POCT CEPHEID COV-2, FLU A/B, RSV - PCR: CPT

## 2023-07-19 PROCEDURE — 87651 STREP A DNA AMP PROBE: CPT

## 2023-07-19 PROCEDURE — 99213 OFFICE O/P EST LOW 20 MIN: CPT

## 2023-07-19 PROCEDURE — 3074F SYST BP LT 130 MM HG: CPT

## 2023-07-19 PROCEDURE — 3078F DIAST BP <80 MM HG: CPT

## 2023-07-19 RX ORDER — AMOXICILLIN 500 MG/1
500 CAPSULE ORAL 2 TIMES DAILY
Qty: 20 CAPSULE | Refills: 0 | Status: SHIPPED | OUTPATIENT
Start: 2023-07-19 | End: 2023-07-29

## 2023-07-19 ASSESSMENT — ENCOUNTER SYMPTOMS
DIARRHEA: 0
PSYCHIATRIC NEGATIVE: 1
COUGH: 0
HEADACHES: 0
SORE THROAT: 1
CONSTIPATION: 0
NECK PAIN: 0
ABDOMINAL PAIN: 0
EYE PAIN: 0
STRIDOR: 0
VOMITING: 0
NAUSEA: 0
SHORTNESS OF BREATH: 0
EYE DISCHARGE: 0
WHEEZING: 1
CHILLS: 0
DIZZINESS: 1
FEVER: 0
EYE REDNESS: 0
SINUS PAIN: 0

## 2023-07-19 ASSESSMENT — FIBROSIS 4 INDEX: FIB4 SCORE: 0.53

## 2023-07-19 NOTE — PROGRESS NOTES
Subjective:   KYLAH Christie is a 33 y.o. male who presents for Pharyngitis (X 1 wk sore throat, cough, dry nasal passage)      HPI: Jj is a very pleasant 33-year-old male who complains of pharyngitis for 1 week.  He is  and speaks minimal English. His wife is on speaker phone translating for him during the visit.  His wife came to urgent care on 7/4/2023 and was positive for group A strep, treated with antibiotics.  She has since finished her course.  Their daughter has also been to urgent care today and is also positive for strep.  Patient is concerned that he has contracted strep throat also and would like to be tested.  Symptoms are aggravated by eating, and he complains of a dry throat.  No relieving factors were identified.  He has not tried any over-the-counter remedies at home.        Review of Systems   Constitutional:  Negative for chills, fever and malaise/fatigue.   HENT:  Positive for congestion and sore throat. Negative for ear discharge, ear pain, hearing loss, sinus pain and tinnitus.    Eyes:  Negative for pain, discharge and redness.   Respiratory:  Positive for wheezing. Negative for cough, shortness of breath and stridor.    Cardiovascular:  Negative for chest pain.   Gastrointestinal:  Negative for abdominal pain, constipation, diarrhea, nausea and vomiting.   Genitourinary:  Negative for dysuria.   Musculoskeletal:  Negative for neck pain.   Skin:  Negative for itching and rash.   Neurological:  Positive for dizziness. Negative for headaches.        Reports several episodes of mild dizziness last week, now resolved.   Psychiatric/Behavioral: Negative.         Medications:    albuterol Aers  budesonide-formoterol Aero  cetirizine Tabs  CINNAMON PO  fish oil Caps  pantoprazole    Allergies: Patient has no known allergies.    Problem List: KYLAH Christie does not have any pertinent problems on file.    Surgical History:  No past surgical history on file.    Past Social Hx: KYLAH Christie  reports  that he has never smoked. He has never used smokeless tobacco. He reports current alcohol use. He reports that he does not use drugs.     Past Family Hx:  KYLAH Christie family history includes Arrythmia in his mother.     Problem list, medications, and allergies reviewed by myself today in Epic.     Objective:     /70 (BP Location: Left arm, Patient Position: Sitting, BP Cuff Size: Adult)   Pulse 87   Temp 36.8 °C (98.2 °F) (Temporal)   Resp 18   Ht 1.829 m (6')   Wt 81.6 kg (180 lb)   SpO2 96%   BMI 24.41 kg/m²     Physical Exam  Constitutional:       General: He is not in acute distress.     Appearance: Normal appearance. He is not ill-appearing, toxic-appearing or diaphoretic.   HENT:      Head: Normocephalic.      Right Ear: Tympanic membrane and external ear normal.      Left Ear: Tympanic membrane and external ear normal.      Nose: Rhinorrhea present.      Mouth/Throat:      Mouth: Mucous membranes are moist.      Pharynx: Posterior oropharyngeal erythema present. No oropharyngeal exudate.      Comments: No cobblestoning.  Eyes:      General: No scleral icterus.        Right eye: No discharge.         Left eye: No discharge.      Conjunctiva/sclera: Conjunctivae normal.   Cardiovascular:      Rate and Rhythm: Normal rate and regular rhythm.      Heart sounds: Normal heart sounds. No murmur heard.     No friction rub. No gallop.   Pulmonary:      Effort: No respiratory distress.      Breath sounds: Normal breath sounds. No wheezing, rhonchi or rales.   Abdominal:      General: Bowel sounds are normal. There is no distension.      Palpations: Abdomen is soft.   Musculoskeletal:         General: Normal range of motion.      Cervical back: Normal range of motion. No rigidity or tenderness.   Lymphadenopathy:      Cervical: Cervical adenopathy present.   Skin:     General: Skin is warm and dry.      Findings: No rash.   Neurological:      General: No focal deficit present.      Mental Status: He is  alert and oriented to person, place, and time.   Psychiatric:         Mood and Affect: Mood normal.         Behavior: Behavior normal.     Centor Score (Modified/McIsaac) for Strep Pharyngitis from [facility]  on 7/19/2023  ** All calculations should be rechecked by clinician prior to use **    RESULT SUMMARY:  2 points  11% - 17%    Optional rapid strep testing and/or culture.      INPUTS:  Age --> 0 = 15-44 years  Exudate or swelling on tonsils --> 0 = No  Tender/swollen anterior cervical lymph nodes --> 1 = Yes  Temp >38&deg;C (100.4&deg;F) --> 0 = No  Cough --> 1 = Cough absent          Lab Results/POC Test Results   Results for orders placed or performed in visit on 07/19/23   POCT CoV-2, Flu A/B, RSV by PCR   Result Value Ref Range    SARS-CoV-2 by PCR Negative Negative, Invalid    Influenza virus A RNA Negative Negative, Invalid    Influenza virus B, PCR Negative Negative, Invalid    RSV, PCR Negative Negative, Invalid   POCT GROUP A STREP, PCR   Result Value Ref Range    POC Group A Strep, PCR Detected (A) Not Detected, Invalid              Assessment/Plan:     Diagnosis and associated orders:     No diagnosis found.   Comments/MDM:     1. Pharyngitis, unspecified etiology  Given that his wife tested positive for strep at urgent care on 7/4/2023, and his daughter is positive for strep today there is a high likelihood that he has strep pharyngitis.  He is interested in also getting tested for COVID and influenza. I explained to him that he is outside the treatment window for both of these and that the test would be for informative purposes only.  He states he states he understands this and he still wishes to be tested.  I asked him if he would like us to run the strep first, because if this is positive then we will have our answer.  He states he does not want to wait for the results and would like to run all the tests now and have me message him on FRESS with the results.  If all negative we will send a  throat culture.  If strep is positive I will send antibiotics to his pharmacy.  He states he has no known drug allergies and has taken penicillin in the past and tolerated it well.  I did instruct him regarding amoxicillin purpose, dosing and side effects should he be positive for strep, and I also instructed him regarding pharyngitis precautions such as no sharing of drinks of water bottles, change out his toothbrush and pillowcases after 48 hours on antibiotics, avoid kissing and mouth contact.  He and his wife are agreeable to this plan of care.  - POCT CoV-2, Flu A/B, RSV by PCR  - POCT GROUP A STREP, PCR    2. Streptococcal pharyngitis  Strep PCR is positive. Covid, flu A and RSV are negative.  Antibiotic sent to pharmacy.  Patient notified via Real Time [facility] as requested.   - amoxicillin (AMOXIL) 500 MG Cap; Take 1 Capsule by mouth 2 times a day for 10 days.  Dispense: 20 Capsule; Refill: 0         Differential diagnosis, natural history, supportive care, and indications for immediate follow-up discussed.    Advised the patient to follow-up with the primary care physician for recheck, reevaluation, and consideration of further management.    Please note that this dictation was created using voice recognition software. I have made a reasonable attempt to correct obvious errors, but I expect that there are errors of grammar and possibly content that I did not discover before finalizing the note.    This note was electronically signed by SHAN Alvarez

## 2023-10-12 ENCOUNTER — APPOINTMENT (OUTPATIENT)
Dept: HEMATOLOGY ONCOLOGY | Facility: MEDICAL CENTER | Age: 33
End: 2023-10-12
Payer: COMMERCIAL

## 2023-10-14 ENCOUNTER — HOSPITAL ENCOUNTER (OUTPATIENT)
Dept: LAB | Facility: MEDICAL CENTER | Age: 33
End: 2023-10-14
Attending: NURSE PRACTITIONER
Payer: COMMERCIAL

## 2023-10-14 DIAGNOSIS — R79.89 ELEVATED FERRITIN LEVEL: Chronic | ICD-10-CM

## 2023-10-14 DIAGNOSIS — R74.8 ELEVATED CPK: ICD-10-CM

## 2023-10-14 LAB
ALBUMIN SERPL BCP-MCNC: 5 G/DL (ref 3.2–4.9)
ALBUMIN/GLOB SERPL: 2 G/DL
ALP SERPL-CCNC: 41 U/L (ref 30–99)
ALT SERPL-CCNC: 21 U/L (ref 2–50)
ANION GAP SERPL CALC-SCNC: 11 MMOL/L (ref 7–16)
AST SERPL-CCNC: 24 U/L (ref 12–45)
BASOPHILS # BLD AUTO: 0.8 % (ref 0–1.8)
BASOPHILS # BLD: 0.04 K/UL (ref 0–0.12)
BILIRUB SERPL-MCNC: 0.3 MG/DL (ref 0.1–1.5)
BUN SERPL-MCNC: 20 MG/DL (ref 8–22)
CALCIUM ALBUM COR SERPL-MCNC: 8.7 MG/DL (ref 8.5–10.5)
CALCIUM SERPL-MCNC: 9.5 MG/DL (ref 8.5–10.5)
CHLORIDE SERPL-SCNC: 104 MMOL/L (ref 96–112)
CK SERPL-CCNC: 419 U/L (ref 0–154)
CO2 SERPL-SCNC: 24 MMOL/L (ref 20–33)
CREAT SERPL-MCNC: 0.96 MG/DL (ref 0.5–1.4)
EOSINOPHIL # BLD AUTO: 0.22 K/UL (ref 0–0.51)
EOSINOPHIL NFR BLD: 4.1 % (ref 0–6.9)
ERYTHROCYTE [DISTWIDTH] IN BLOOD BY AUTOMATED COUNT: 42.3 FL (ref 35.9–50)
FERRITIN SERPL-MCNC: 554 NG/ML (ref 22–322)
GFR SERPLBLD CREATININE-BSD FMLA CKD-EPI: 107 ML/MIN/1.73 M 2
GLOBULIN SER CALC-MCNC: 2.5 G/DL (ref 1.9–3.5)
GLUCOSE SERPL-MCNC: 93 MG/DL (ref 65–99)
HCT VFR BLD AUTO: 42.4 % (ref 42–52)
HGB BLD-MCNC: 13.2 G/DL (ref 14–18)
IMM GRANULOCYTES # BLD AUTO: 0.01 K/UL (ref 0–0.11)
IMM GRANULOCYTES NFR BLD AUTO: 0.2 % (ref 0–0.9)
LYMPHOCYTES # BLD AUTO: 2.61 K/UL (ref 1–4.8)
LYMPHOCYTES NFR BLD: 49.2 % (ref 22–41)
MCH RBC QN AUTO: 27.4 PG (ref 27–33)
MCHC RBC AUTO-ENTMCNC: 31.1 G/DL (ref 32.3–36.5)
MCV RBC AUTO: 88 FL (ref 81.4–97.8)
MONOCYTES # BLD AUTO: 0.39 K/UL (ref 0–0.85)
MONOCYTES NFR BLD AUTO: 7.3 % (ref 0–13.4)
NEUTROPHILS # BLD AUTO: 2.04 K/UL (ref 1.82–7.42)
NEUTROPHILS NFR BLD: 38.4 % (ref 44–72)
NRBC # BLD AUTO: 0 K/UL
NRBC BLD-RTO: 0 /100 WBC (ref 0–0.2)
PLATELET # BLD AUTO: 263 K/UL (ref 164–446)
PMV BLD AUTO: 9.5 FL (ref 9–12.9)
POTASSIUM SERPL-SCNC: 3.9 MMOL/L (ref 3.6–5.5)
PROT SERPL-MCNC: 7.5 G/DL (ref 6–8.2)
RBC # BLD AUTO: 4.82 M/UL (ref 4.7–6.1)
SODIUM SERPL-SCNC: 139 MMOL/L (ref 135–145)
WBC # BLD AUTO: 5.3 K/UL (ref 4.8–10.8)

## 2023-10-14 PROCEDURE — 80053 COMPREHEN METABOLIC PANEL: CPT

## 2023-10-14 PROCEDURE — 82728 ASSAY OF FERRITIN: CPT

## 2023-10-14 PROCEDURE — 85025 COMPLETE CBC W/AUTO DIFF WBC: CPT

## 2023-10-14 PROCEDURE — 36415 COLL VENOUS BLD VENIPUNCTURE: CPT

## 2023-10-14 PROCEDURE — 82550 ASSAY OF CK (CPK): CPT

## 2023-10-26 ENCOUNTER — HOSPITAL ENCOUNTER (OUTPATIENT)
Dept: HEMATOLOGY ONCOLOGY | Facility: MEDICAL CENTER | Age: 33
End: 2023-10-26
Attending: NURSE PRACTITIONER
Payer: COMMERCIAL

## 2023-10-26 VITALS
SYSTOLIC BLOOD PRESSURE: 102 MMHG | HEART RATE: 82 BPM | BODY MASS INDEX: 24.79 KG/M2 | DIASTOLIC BLOOD PRESSURE: 60 MMHG | WEIGHT: 183 LBS | RESPIRATION RATE: 16 BRPM | HEIGHT: 72 IN | OXYGEN SATURATION: 97 % | TEMPERATURE: 98.6 F

## 2023-10-26 DIAGNOSIS — R74.8 ELEVATED CPK: ICD-10-CM

## 2023-10-26 DIAGNOSIS — Z09 ENCOUNTER FOR HEMATOLOGY FOLLOW-UP: ICD-10-CM

## 2023-10-26 DIAGNOSIS — R79.89 ELEVATED FERRITIN LEVEL: Chronic | ICD-10-CM

## 2023-10-26 PROCEDURE — 99214 OFFICE O/P EST MOD 30 MIN: CPT | Performed by: NURSE PRACTITIONER

## 2023-10-26 PROCEDURE — 99212 OFFICE O/P EST SF 10 MIN: CPT | Performed by: NURSE PRACTITIONER

## 2023-10-26 ASSESSMENT — ENCOUNTER SYMPTOMS
CONSTIPATION: 0
TINGLING: 0
MYALGIAS: 1
DIZZINESS: 0
FEVER: 0
SHORTNESS OF BREATH: 0
NAUSEA: 0
VOMITING: 0
INSOMNIA: 0
WHEEZING: 0
WEIGHT LOSS: 0
CHILLS: 0
COUGH: 0
HEADACHES: 1
DIARRHEA: 0
PALPITATIONS: 0

## 2023-10-26 ASSESSMENT — FIBROSIS 4 INDEX: FIB4 SCORE: 0.64

## 2023-10-26 NOTE — PROGRESS NOTES
"Subjective     Demetrius Christie is a 33 y.o. male who presents with Follow-Up (Nobile/Osyka/ 6mo fv labs prior)            HPI  Mr. Christie presents for evaluation of hemochromatosis/elevated ferritin.  He is unaccompanied for today's visit and  services are being utilized.    Patient was referred per PCP in 11/2020 for further evaluation of elevated ferritin, without obvious cause, following significant URI/pneumonia: Hematology workup was facilitated by Dr. Buchanan and included: elevated ferritin and CPK; negative hemochromatosis (C2A2Y, H63D, S65C negative); normal TIBC; normal haptoglobin and normal C-reactive protein; normal sed rate; negative BRYANT and Lupus anticoagulant; normal myoglobin and carnitine panel, normal TSH. Patient is a non-smoker and does not take supplemental testosterone, which is low normal. Ferritin continues to remain stably elevated, possibly reactive to elevated CPK. TP has not been completed or indicated to date, suspect reactive to elevated CPK, patient continues with routine surveillance.     Patient continues with elevated CPK since viral illness in Fall 2020. In addition to above testing, he has been evaluated by Neurology, Dr. Amato, on 9/28/21, with negative workup per review of records and so EMG was not indicated; CK isoenzymes were normal. Sleep study indicated moderate obstructive apnea with CPAP receipt still pending. He has been evaluated by Sleep Medicine with moderate obstructive sleep apnea noted, CPAP in use but he has trouble tolerating. Cardiology consultation completed per Dr. Rodriguez 9/9/21, which was ultimately negative.     Patient continues with baseline allergies but feels better overall than he has in quite awhile.  He does note bilateral rib pain when he is stretching out overhead, this is not new but seems more frequent.  He is otherwise at his baseline.      Review of Systems   Constitutional:  Negative for chills, fever, malaise/fatigue (\"better than he has " "in awhile\") and weight loss (stable - up).   Respiratory:  Negative for cough, shortness of breath and wheezing.    Cardiovascular:  Negative for chest pain, palpitations and leg swelling.   Gastrointestinal:  Negative for constipation, diarrhea, nausea and vomiting.   Genitourinary:  Negative for dysuria.   Musculoskeletal:  Positive for myalgias (cramping discomfort bilateral rib pain with stretching hands overhead - not new discomfort but happening more frequently tthis year). Negative for joint pain.   Neurological:  Positive for headaches (r/t allergies). Negative for dizziness and tingling.   Endo/Heme/Allergies:  Positive for environmental allergies (is needing to use flonase more consistently for relief; also taking PO allergy tab, whihc helps a bit).   Psychiatric/Behavioral:  The patient does not have insomnia.      No Known Allergies      Current Outpatient Medications on File Prior to Encounter   Medication Sig Dispense Refill    pantoprazole (PROTONIX) 20 MG tablet Take 1 Tablet by mouth every day. 30 Tablet 2    budesonide-formoterol (SYMBICORT) 160-4.5 MCG/ACT Aerosol Inhale 2 Puffs 2 times a day. Use spacer. Rinse mouth after each use. 2 Each 2    albuterol 108 (90 Base) MCG/ACT Aero Soln inhalation aerosol Inhale 2 Puffs every 6 hours as needed for Shortness of Breath. 8.5 g 6    Omega-3 Fatty Acids (FISH OIL) 1000 MG Cap capsule Take 1,000 mg by mouth 3 times a day with meals.      CINNAMON PO Take  by mouth.      cetirizine (ZYRTEC) 10 MG Tab Take 10 mg by mouth every day.       No current facility-administered medications on file prior to encounter.              Objective     /60 (BP Location: Right arm, Patient Position: Sitting, BP Cuff Size: Adult)   Pulse 82   Temp 37 °C (98.6 °F) (Temporal)   Resp 16   Ht 1.829 m (6')   Wt 83 kg (183 lb)   SpO2 97%   BMI 24.82 kg/m²      Physical Exam  Vitals reviewed.   Constitutional:       General: He is not in acute distress.     Appearance: " He is well-developed. He is not diaphoretic.   HENT:      Head: Normocephalic and atraumatic.   Eyes:      General: No scleral icterus.        Right eye: No discharge.         Left eye: No discharge.   Cardiovascular:      Rate and Rhythm: Normal rate and regular rhythm.      Heart sounds: Normal heart sounds. No murmur heard.     No friction rub. No gallop.   Pulmonary:      Effort: Pulmonary effort is normal. No respiratory distress.      Breath sounds: Normal breath sounds. No wheezing.   Abdominal:      General: There is no distension.      Palpations: Abdomen is soft.      Tenderness: There is no abdominal tenderness.   Musculoskeletal:         General: Normal range of motion.      Cervical back: Normal range of motion.   Skin:     General: Skin is warm and dry.      Coloration: Skin is not pale.      Findings: No erythema or rash.   Neurological:      Mental Status: He is alert and oriented to person, place, and time.   Psychiatric:         Behavior: Behavior normal.          Latest Reference Range & Units 10/12/22 16:28 04/10/23 06:57 10/14/23 11:29   WBC 4.8 - 10.8 K/uL 5.9 4.4 (L) 5.3   RBC 4.70 - 6.10 M/uL 4.99 5.14 4.82   Hemoglobin 14.0 - 18.0 g/dL 14.0 14.2 13.2 (L)   Hematocrit 42.0 - 52.0 % 42.9 45.8 42.4   MCV 81.4 - 97.8 fL 86.0 89.1 88.0   MCH 27.0 - 33.0 pg 28.1 27.6 27.4   MCHC 32.3 - 36.5 g/dL 32.6 (L) 31.0 (L) 31.1 (L)   RDW 35.9 - 50.0 fL 40.3 43.8 42.3   Platelet Count 164 - 446 K/uL 304 251 263   MPV 9.0 - 12.9 fL 9.2 9.6 9.5   Neutrophils-Polys 44.00 - 72.00 % 53.90 37.80 (L) 38.40 (L)   Neutrophils (Absolute) 1.82 - 7.42 K/uL 3.16 1.67 (L) 2.04   Lymphocytes 22.00 - 41.00 % 34.50 43.20 (H) 49.20 (H)   Lymphs (Absolute) 1.00 - 4.80 K/uL 2.02 1.91 2.61   Monocytes 0.00 - 13.40 % 7.80 15.40 (H) 7.30   Monos (Absolute) 0.00 - 0.85 K/uL 0.46 0.68 0.39   Eosinophils 0.00 - 6.90 % 2.60 2.50 4.10   Eos (Absolute) 0.00 - 0.51 K/uL 0.15 0.11 0.22   Basophils 0.00 - 1.80 % 1.00 0.90 0.80   Baso  (Absolute) 0.00 - 0.12 K/uL 0.06 0.04 0.04   Immature Granulocytes 0.00 - 0.90 % 0.20 0.20 0.20   Immature Granulocytes (abs) 0.00 - 0.11 K/uL 0.01 0.01 0.01   Nucleated RBC 0.00 - 0.20 /100 WBC 0.00 0.00 0.00   NRBC (Absolute) K/uL 0.00 0.00 0.00   Sodium 135 - 145 mmol/L 139 142 139   Potassium 3.6 - 5.5 mmol/L 3.9 4.0 3.9   Chloride 96 - 112 mmol/L 103 108 104   Co2 20 - 33 mmol/L 21 20 24   Anion Gap 7.0 - 16.0  15.0 14.0 11.0   Glucose 65 - 99 mg/dL 84 105 (H) 93   Bun 8 - 22 mg/dL 23 (H) 19 20   Creatinine 0.50 - 1.40 mg/dL 1.05 0.90 0.96   GFR (CKD-EPI) >60 mL/min/1.73 m 2 97 116 107   Calcium 8.5 - 10.5 mg/dL 9.5 9.1 9.5   Correct Calcium 8.5 - 10.5 mg/dL  8.7 8.7   AST(SGOT) 12 - 45 U/L 25 20 24   ALT(SGPT) 2 - 50 U/L 25 23 21   Alkaline Phosphatase 30 - 99 U/L 50 47 41   Total Bilirubin 0.1 - 1.5 mg/dL 0.3 0.2 0.3   Albumin 3.2 - 4.9 g/dL 5.0 (H) 4.5 5.0 (H)   Total Protein 6.0 - 8.2 g/dL 7.8 7.7 7.5   Globulin 1.9 - 3.5 g/dL 2.8 3.2 2.5   A-G Ratio g/dL 1.8 1.4 2.0   CPK Total 0 - 154 U/L 361 (H) 244 (H) 419 (H)   Ferritin 22.0 - 322.0 ng/mL 605.0 (H) 502.0 (H) 554.0 (H)   Influenza virus A RNA Negative, Invalid       Influenza virus B, PCR Negative, Invalid       RSV, PCR Negative, Invalid       SARS-CoV-2 by PCR Negative, Invalid       POC Group A Strep, PCR Not Detected, Invalid       (L): Data is abnormally low  (H): Data is abnormally high  !: Data is abnormal                    Assessment & Plan     1. Elevated ferritin level        2. Elevated CPK        3. Encounter for hematology follow-up          1.  Elevated CPK: Chronic, negative cardiology and neurology workups. Elevated this visit, some bilateral rib pain reports, no c/p dark urine, continue to monitor.     2.  Elevated Ferritin: Negative for hematochromatosis. CBC is essentially normal and stable, ferritin is a bit up this visit, TP is not indicated.      We will continue to monitor, patient will repeat CBC, ferritin, CPK, CMP in 6 months  and return for reevaluation, sooner as needed.      The patient verbalized agreement and understanding of current plan. All questions and concerns were addressed at time of visit.    Please note that this dictation was created using voice recognition software. I have made every reasonable attempt to correct obvious errors, but I expect that there are errors of grammar and possibly content that I did not discover before finalizing the note.

## 2023-12-13 ENCOUNTER — OFFICE VISIT (OUTPATIENT)
Dept: URGENT CARE | Facility: PHYSICIAN GROUP | Age: 33
End: 2023-12-13
Payer: COMMERCIAL

## 2023-12-13 VITALS
RESPIRATION RATE: 18 BRPM | OXYGEN SATURATION: 97 % | TEMPERATURE: 98.6 F | BODY MASS INDEX: 25.06 KG/M2 | DIASTOLIC BLOOD PRESSURE: 82 MMHG | HEIGHT: 72 IN | WEIGHT: 185 LBS | SYSTOLIC BLOOD PRESSURE: 114 MMHG | HEART RATE: 94 BPM

## 2023-12-13 DIAGNOSIS — U07.1 COVID-19: ICD-10-CM

## 2023-12-13 DIAGNOSIS — J02.9 SORE THROAT: ICD-10-CM

## 2023-12-13 DIAGNOSIS — R05.1 ACUTE COUGH: ICD-10-CM

## 2023-12-13 LAB
FLUAV RNA SPEC QL NAA+PROBE: NEGATIVE
FLUBV RNA SPEC QL NAA+PROBE: NEGATIVE
RSV RNA SPEC QL NAA+PROBE: NEGATIVE
S PYO DNA SPEC NAA+PROBE: NOT DETECTED
SARS-COV-2 RNA RESP QL NAA+PROBE: POSITIVE

## 2023-12-13 PROCEDURE — 3074F SYST BP LT 130 MM HG: CPT | Performed by: PHYSICIAN ASSISTANT

## 2023-12-13 PROCEDURE — 3079F DIAST BP 80-89 MM HG: CPT | Performed by: PHYSICIAN ASSISTANT

## 2023-12-13 PROCEDURE — 0241U POCT CEPHEID COV-2, FLU A/B, RSV - PCR: CPT | Performed by: PHYSICIAN ASSISTANT

## 2023-12-13 PROCEDURE — 87651 STREP A DNA AMP PROBE: CPT | Performed by: PHYSICIAN ASSISTANT

## 2023-12-13 PROCEDURE — 99213 OFFICE O/P EST LOW 20 MIN: CPT | Performed by: PHYSICIAN ASSISTANT

## 2023-12-13 RX ORDER — BENZONATATE 100 MG/1
100 CAPSULE ORAL 3 TIMES DAILY PRN
Qty: 20 CAPSULE | Refills: 0 | Status: SHIPPED | OUTPATIENT
Start: 2023-12-13

## 2023-12-13 RX ORDER — DEXTROMETHORPHAN HYDROBROMIDE AND PROMETHAZINE HYDROCHLORIDE 15; 6.25 MG/5ML; MG/5ML
5 SYRUP ORAL EVERY 4 HOURS PRN
Qty: 118 ML | Refills: 0 | Status: SHIPPED | OUTPATIENT
Start: 2023-12-13

## 2023-12-13 ASSESSMENT — FIBROSIS 4 INDEX: FIB4 SCORE: 0.64

## 2023-12-13 NOTE — PROGRESS NOTES
Subjective:   KYLAH Christie is a 33 y.o. male who presents for Pharyngitis (Cough, congestion/X 3 days )   A qualified  was used to interpret  during this encounter.  Patient presents with complaint of ST, cough x 5 days.  Has young kids at home.  Cough has progressed.  He notes some chills, no measured fevers at home.  No myalgias.  No SOB.  Occasional SOB.  He does not pain with swallowing.  He is able to eat and drink.  NO nausea or vomiting. He is vaccinated against covid. Some colleagues.  Has tried otc cold medications.  He denies headaches or sinus pressure.  He does have a history of pna.        Medications:  albuterol Aers  budesonide-formoterol Aero  cetirizine Tabs  CINNAMON PO  fish oil Caps  pantoprazole    Allergies:             Patient has no known allergies.    Surgical History:       No past surgical history on file.    Past Social Hx:  KYLAH Christie  reports that he has never smoked. He has never used smokeless tobacco. He reports current alcohol use. He reports that he does not use drugs.     Past Family Hx:   KYLAH Christie family history includes Arrythmia in his mother.       Problem list, medications, and allergies reviewed by myself today in Epic.     Objective:     /82   Pulse 94   Temp 37 °C (98.6 °F) (Temporal)   Resp 18   Ht 1.829 m (6')   Wt 83.9 kg (185 lb)   SpO2 97%   BMI 25.09 kg/m²     Physical Exam  Vitals and nursing note reviewed.   Constitutional:       General: He is not in acute distress.     Appearance: He is well-developed. He is not ill-appearing or diaphoretic.   HENT:      Head: Normocephalic.      Right Ear: Tympanic membrane, ear canal and external ear normal.      Left Ear: Tympanic membrane, ear canal and external ear normal.      Nose: Mucosal edema and rhinorrhea present.      Mouth/Throat:      Pharynx: Posterior oropharyngeal erythema present.   Eyes:      General:         Right eye: No discharge.         Left eye: No discharge.       Conjunctiva/sclera: Conjunctivae normal.      Pupils: Pupils are equal, round, and reactive to light.   Cardiovascular:      Rate and Rhythm: Normal rate and regular rhythm.      Pulses: Normal pulses.      Heart sounds: Normal heart sounds. No murmur heard.     No friction rub.   Pulmonary:      Effort: Pulmonary effort is normal. No accessory muscle usage or respiratory distress.      Breath sounds: No stridor. No wheezing, rhonchi or rales.      Comments: Lungs are clear to auscultation bilaterally, no rhonchi rales or wheezes  Abdominal:      Palpations: Abdomen is soft.   Musculoskeletal:         General: Normal range of motion.      Cervical back: Normal range of motion.      Right lower leg: No edema.      Left lower leg: No edema.   Lymphadenopathy:      Cervical: No cervical adenopathy.   Skin:     General: Skin is warm and dry.   Neurological:      Mental Status: He is alert and oriented to person, place, and time.       Results for orders placed or performed in visit on 12/13/23   POCT CEPHEID COV-2, FLU A/B, RSV - PCR   Result Value Ref Range    SARS-CoV-2 by PCR Positive (A) Negative, Invalid    Influenza virus A RNA Negative Negative, Invalid    Influenza virus B, PCR Negative Negative, Invalid    RSV, PCR Negative Negative, Invalid   POCT CEPHEID GROUP A STREP - PCR   Result Value Ref Range    POC Group A Strep, PCR Not Detected Not Detected, Invalid       Assessment/Plan:     Diagnosis and Associated Orders:     1. Acute cough  - POCT CEPHEID COV-2, FLU A/B, RSV - PCR  - benzonatate (TESSALON) 100 MG Cap; Take 1 Capsule by mouth 3 times a day as needed for Cough.  Dispense: 20 Capsule; Refill: 0  - promethazine-dextromethorphan (PROMETHAZINE-DM) 6.25-15 MG/5ML syrup; Take 5 mL by mouth every four hours as needed for Cough.  Dispense: 118 mL; Refill: 0    2. Sore throat  - POCT CEPHEID COV-2, FLU A/B, RSV - PCR  - POCT CEPHEID GROUP A STREP - PCR    3. COVID-19        Comments/MDM:  Rapid Covid  testing in clinic was positive. At this point the patient appears to be hemodynamically stable without evidence of hypoxia or endorgan injury. I discussed with her the possibility of decompensation and to monitor symptoms closely. Consider pulse oximetry and ER presentation if oximetry dips below 90%.  We discussed self isolation and ER precautions.  Patient should to proceed to ED for development of symptoms including but not limited to shortness of breath breath, respiratory distress, or worsening symptoms not manageable at home.  I instructed the patient to try to follow up with their PCP (if applicable) for follow up care.  If requested, I provided the patient with a work note to provide to their employer or school regarding returning to work and discontinuation of self isolation.  Symptomatic and supportive care:   Plenty of oral hydration and rest   Over the counter cough suppressant as directed.  Tylenol or ibuprofen for pain and fever as directed.   Warm salt water gargles for sore throat, soft foods, cool liquids.   Saline nasal spray and Flonase as a decongestant.   Infection control measures at home. Stay away from people, Hand washing, covering sneeze/cough, disinfect surfaces.   Remain home from work, school, and other populated environments.  Follow CDC guidelines regarding quarantine.  All questions were answered and patient demonstrated verbal understanding of above.       Patient should to proceed to ED for development of symptoms including but not limited to shortness of breath breath, respiratory distress, increased fever, persistent symptoms, or worsening symptoms not manageable at home.    I personally reviewed prior external notes and test results pertinent to today's visit. Supportive care, natural history, differential diagnoses, and indications for immediate follow-up discussed. Return to clinic or go to ED if symptoms worsen or persist.  Red flag symptoms discussed.  Patient/Parent/Guardian  voices understanding. Follow-up with your primary care provider in 3-5 days.  All side effects of medication discussed including allergic response, GI upset, tendon injury, rash, sedation etc    Please note that this dictation was created using voice recognition software. I have made a reasonable attempt to correct obvious errors, but I expect that there are errors of grammar and possibly content that I did not discover before finalizing the note.    This note was electronically signed by Princess Garnica PA-C

## 2024-04-03 NOTE — PROGRESS NOTES
04/23/24    Subjective    Chief Complaint:  Follow up elevated ferritin    HPI:  33 male initially evaluated by Dr. Buchanan for an elevated ferritin. Hemochromatosis screening was negative. CT abdomen described as showing a normal liver, as did an MRI of the liver although it was not the iron quantitation study. . He runs an elevated CPK of uncertain etiology. Ferritin's have been in the 500 - 600 range.     ROS:    Constitutional: No weight loss  Skin: No rash or jaundice  HENT: No change in eyesight or hearing  Cardiovascular:No chest pain or arrythmia  Respiratory:No cough or SOB  GI:No nausea, vomiting, diarrhea, constipation  :No dysuria or frequency  Musculoskeletal:No bone or joint pain  Neuro:No sx's of neuropathy  Psych: No complaints    PMH:      No Known Allergies    Past Medical History:   Diagnosis Date    Allergy to pollen 2014    Anemia 11/13/2020    Hg 12 Ordered ffib, Hg electro    Bleeding nose 1999    Heart palpitations 1998    Wrist fracture, bilateral         No past surgical history on file.     Medications:    Current Outpatient Medications on File Prior to Visit   Medication Sig Dispense Refill    benzonatate (TESSALON) 100 MG Cap Take 1 Capsule by mouth 3 times a day as needed for Cough. 20 Capsule 0    promethazine-dextromethorphan (PROMETHAZINE-DM) 6.25-15 MG/5ML syrup Take 5 mL by mouth every four hours as needed for Cough. 118 mL 0    pantoprazole (PROTONIX) 20 MG tablet Take 1 Tablet by mouth every day. 30 Tablet 2    budesonide-formoterol (SYMBICORT) 160-4.5 MCG/ACT Aerosol Inhale 2 Puffs 2 times a day. Use spacer. Rinse mouth after each use. 2 Each 2    albuterol 108 (90 Base) MCG/ACT Aero Soln inhalation aerosol Inhale 2 Puffs every 6 hours as needed for Shortness of Breath. 8.5 g 6    Omega-3 Fatty Acids (FISH OIL) 1000 MG Cap capsule Take 1,000 mg by mouth 3 times a day with meals.      CINNAMON PO Take  by mouth.      cetirizine (ZYRTEC) 10 MG Tab Take 10 mg by mouth every day.        No current facility-administered medications on file prior to visit.       Social History     Tobacco Use    Smoking status: Never    Smokeless tobacco: Never   Substance Use Topics    Alcohol use: Yes     Comment: 1 - 2 beers daily         Family History   Problem Relation Age of Onset    Arrythmia Mother         had ablation procedure 2017        Objective    Vitals:    There were no vitals taken for this visit.    Physical Exam:    Appears well-developed and well-nourished. No distress.    Head -  Normocephalic .   Eyes - Pupils are equal. Conjunctivae normal. No scleral icterus.   Ears - normal hearing  Mouth - Throat: Oropharynx is clear and moist. No oropharyngeal exudate  Neck - Neck supple. No thyromegaly  Cardiovascular - Normal rate, regular rhythm, normal heart sounds and intact distal pulses. No  gallop, murmur or rub  Pulmonary - Normal breath sounds.  No wheeze, rales or rhonchi  Abdominal -Soft. No distension, tenderness, organomegaly or mass  Extremities-  No edema or tenderness.    Nodes - No submental, submandibular, preauricular, cervical, axillary or inguinal adenopathy.    Neurological -   Alert and oriented.  Skin - Skin is warm and dry. No rash noted. Not diaphoretic. No erythema. No pallor. No jaundice   Psychiatric -  Normal mood and affect.    Labs:      Assessment    Imp:    Visit Diagnosis:    1. Elevated ferritin level        2. Elevated CPK              Plan:      Brennan Arrington M.D.

## 2024-04-23 ENCOUNTER — APPOINTMENT (OUTPATIENT)
Dept: HEMATOLOGY ONCOLOGY | Facility: MEDICAL CENTER | Age: 34
End: 2024-04-23
Payer: COMMERCIAL

## 2024-04-23 DIAGNOSIS — R74.8 ELEVATED CPK: ICD-10-CM

## 2024-04-23 DIAGNOSIS — R79.89 ELEVATED FERRITIN LEVEL: ICD-10-CM

## 2024-04-25 DIAGNOSIS — R74.8 ELEVATED CPK: ICD-10-CM

## 2024-04-25 DIAGNOSIS — R79.89 ELEVATED FERRITIN LEVEL: ICD-10-CM

## 2024-04-25 DIAGNOSIS — Z09 ENCOUNTER FOR HEMATOLOGY FOLLOW-UP: ICD-10-CM

## 2024-05-02 ENCOUNTER — HOSPITAL ENCOUNTER (OUTPATIENT)
Dept: LAB | Facility: MEDICAL CENTER | Age: 34
End: 2024-05-02
Attending: NURSE PRACTITIONER
Payer: COMMERCIAL

## 2024-05-02 DIAGNOSIS — Z09 ENCOUNTER FOR HEMATOLOGY FOLLOW-UP: ICD-10-CM

## 2024-05-02 DIAGNOSIS — R74.8 ELEVATED CPK: ICD-10-CM

## 2024-05-02 DIAGNOSIS — R79.89 ELEVATED FERRITIN LEVEL: ICD-10-CM

## 2024-05-02 LAB
ALBUMIN SERPL BCP-MCNC: 4.9 G/DL (ref 3.2–4.9)
ALBUMIN/GLOB SERPL: 1.7 G/DL
ALP SERPL-CCNC: 46 U/L (ref 30–99)
ALT SERPL-CCNC: 26 U/L (ref 2–50)
ANION GAP SERPL CALC-SCNC: 12 MMOL/L (ref 7–16)
AST SERPL-CCNC: 26 U/L (ref 12–45)
BASOPHILS # BLD AUTO: 1 % (ref 0–1.8)
BASOPHILS # BLD: 0.05 K/UL (ref 0–0.12)
BILIRUB SERPL-MCNC: 0.4 MG/DL (ref 0.1–1.5)
BUN SERPL-MCNC: 18 MG/DL (ref 8–22)
CALCIUM ALBUM COR SERPL-MCNC: 9.1 MG/DL (ref 8.5–10.5)
CALCIUM SERPL-MCNC: 9.8 MG/DL (ref 8.5–10.5)
CHLORIDE SERPL-SCNC: 105 MMOL/L (ref 96–112)
CK SERPL-CCNC: 526 U/L (ref 0–154)
CO2 SERPL-SCNC: 26 MMOL/L (ref 20–33)
CREAT SERPL-MCNC: 0.98 MG/DL (ref 0.5–1.4)
EOSINOPHIL # BLD AUTO: 0.18 K/UL (ref 0–0.51)
EOSINOPHIL NFR BLD: 3.4 % (ref 0–6.9)
ERYTHROCYTE [DISTWIDTH] IN BLOOD BY AUTOMATED COUNT: 42.2 FL (ref 35.9–50)
FASTING STATUS PATIENT QL REPORTED: NORMAL
GFR SERPLBLD CREATININE-BSD FMLA CKD-EPI: 104 ML/MIN/1.73 M 2
GLOBULIN SER CALC-MCNC: 2.9 G/DL (ref 1.9–3.5)
GLUCOSE SERPL-MCNC: 107 MG/DL (ref 65–99)
HCT VFR BLD AUTO: 46.2 % (ref 42–52)
HGB BLD-MCNC: 14.6 G/DL (ref 14–18)
IMM GRANULOCYTES # BLD AUTO: 0.01 K/UL (ref 0–0.11)
IMM GRANULOCYTES NFR BLD AUTO: 0.2 % (ref 0–0.9)
LYMPHOCYTES # BLD AUTO: 2.12 K/UL (ref 1–4.8)
LYMPHOCYTES NFR BLD: 40.5 % (ref 22–41)
MCH RBC QN AUTO: 27.9 PG (ref 27–33)
MCHC RBC AUTO-ENTMCNC: 31.6 G/DL (ref 32.3–36.5)
MCV RBC AUTO: 88.2 FL (ref 81.4–97.8)
MONOCYTES # BLD AUTO: 0.39 K/UL (ref 0–0.85)
MONOCYTES NFR BLD AUTO: 7.5 % (ref 0–13.4)
NEUTROPHILS # BLD AUTO: 2.48 K/UL (ref 1.82–7.42)
NEUTROPHILS NFR BLD: 47.4 % (ref 44–72)
NRBC # BLD AUTO: 0 K/UL
NRBC BLD-RTO: 0 /100 WBC (ref 0–0.2)
PLATELET # BLD AUTO: 294 K/UL (ref 164–446)
PMV BLD AUTO: 9.6 FL (ref 9–12.9)
POTASSIUM SERPL-SCNC: 4.3 MMOL/L (ref 3.6–5.5)
PROT SERPL-MCNC: 7.8 G/DL (ref 6–8.2)
RBC # BLD AUTO: 5.24 M/UL (ref 4.7–6.1)
SODIUM SERPL-SCNC: 143 MMOL/L (ref 135–145)
WBC # BLD AUTO: 5.2 K/UL (ref 4.8–10.8)

## 2024-05-03 LAB — FERRITIN SERPL-MCNC: 569 NG/ML (ref 22–322)

## 2024-05-08 ENCOUNTER — HOSPITAL ENCOUNTER (OUTPATIENT)
Dept: HEMATOLOGY ONCOLOGY | Facility: MEDICAL CENTER | Age: 34
End: 2024-05-08
Attending: NURSE PRACTITIONER
Payer: COMMERCIAL

## 2024-05-08 VITALS
HEART RATE: 82 BPM | HEIGHT: 72 IN | TEMPERATURE: 98.5 F | WEIGHT: 186 LBS | OXYGEN SATURATION: 100 % | BODY MASS INDEX: 25.19 KG/M2 | DIASTOLIC BLOOD PRESSURE: 70 MMHG | SYSTOLIC BLOOD PRESSURE: 104 MMHG

## 2024-05-08 DIAGNOSIS — Z09 ENCOUNTER FOR HEMATOLOGY FOLLOW-UP: ICD-10-CM

## 2024-05-08 DIAGNOSIS — R79.89 ELEVATED FERRITIN LEVEL: Chronic | ICD-10-CM

## 2024-05-08 DIAGNOSIS — R74.8 ELEVATED CPK: ICD-10-CM

## 2024-05-08 PROCEDURE — 99214 OFFICE O/P EST MOD 30 MIN: CPT | Performed by: NURSE PRACTITIONER

## 2024-05-08 ASSESSMENT — ENCOUNTER SYMPTOMS
INSOMNIA: 0
VOMITING: 0
WEIGHT LOSS: 0
DIARRHEA: 0
TINGLING: 0
ABDOMINAL PAIN: 1
NAUSEA: 0
SHORTNESS OF BREATH: 0
MYALGIAS: 1
CONSTIPATION: 0
CHILLS: 0
PALPITATIONS: 0
HEADACHES: 0
BLOOD IN STOOL: 0
WHEEZING: 0
FEVER: 0
COUGH: 0
DIZZINESS: 0

## 2024-05-08 ASSESSMENT — FIBROSIS 4 INDEX: FIB4 SCORE: 0.55

## 2024-05-08 NOTE — PROGRESS NOTES
Subjective     Demetrius Christie is a 33 y.o. male who presents with Follow-Up (Nobile/6 mo fv w/lab prior)            HPI  Mr. Christie presents for evaluation of elevated CPK and ferritin. He is unaccompanied for today's visit and  services are being utilized.     Patient was referred per PCP in 11/2020 for further evaluation of elevated ferritin, without obvious cause, following significant URI/pneumonia: Hematology workup was facilitated by Dr. Buchanan and included: elevated ferritin and CPK; negative hemochromatosis (C2A2Y, H63D, S65C negative); normal TIBC; normal haptoglobin and normal C-reactive protein; normal sed rate; negative BRYANT and Lupus anticoagulant; normal myoglobin and carnitine panel, normal TSH. Patient is a non-smoker and does not take supplemental testosterone, which is low. Ferritin continues to remain stably elevated, possibly reactive to elevated CPK. TP has not been completed or indicated to date, suspect reactive to elevated CPK, patient continues with routine surveillance.     Patient continues with elevated CPK since viral illness in Fall 2020. In addition to above testing, he has been evaluated by Neurology, Dr. Amato, on 9/28/21, with negative workup per review of records and so EMG was not indicated; CK isoenzymes were normal. Sleep study indicated moderate obstructive apnea with CPAP receipt still pending. He has been evaluated by Sleep Medicine with moderate obstructive sleep apnea noted, CPAP in use but he has trouble tolerating. Cardiology consultation completed per Dr. Rodriguez 9/9/21, which was ultimately negative.     Patient orts some diaphragmatic abdominal pain when bending over but is intermittent and self-limiting.  He notes muscular pain.,  Worse with sitting for long periods continues working full-time and is otherwise at his baseline but is frustrated with continued hide counts, mild fatigue.       Review of Systems   Constitutional:  Negative for chills, fever,  malaise/fatigue (working FT) and weight loss (stable).   Respiratory:  Negative for cough, shortness of breath and wheezing.    Cardiovascular:  Negative for chest pain, palpitations and leg swelling.   Gastrointestinal:  Positive for abdominal pain (intermittent cramping along diaphragm area with bending over). Negative for blood in stool, constipation (Last BM this am), diarrhea, melena, nausea and vomiting.   Genitourinary:  Negative for dysuria.   Musculoskeletal:  Positive for myalgias (bilateral scapular pain - not working out as much recently; worse with sitting for extended periods). Negative for joint pain.   Neurological:  Negative for dizziness, tingling and headaches.   Psychiatric/Behavioral:  The patient does not have insomnia.      No Known Allergies      Current Outpatient Medications on File Prior to Encounter   Medication Sig Dispense Refill    benzonatate (TESSALON) 100 MG Cap Take 1 Capsule by mouth 3 times a day as needed for Cough. 20 Capsule 0    promethazine-dextromethorphan (PROMETHAZINE-DM) 6.25-15 MG/5ML syrup Take 5 mL by mouth every four hours as needed for Cough. 118 mL 0    budesonide-formoterol (SYMBICORT) 160-4.5 MCG/ACT Aerosol Inhale 2 Puffs 2 times a day. Use spacer. Rinse mouth after each use. 2 Each 2    albuterol 108 (90 Base) MCG/ACT Aero Soln inhalation aerosol Inhale 2 Puffs every 6 hours as needed for Shortness of Breath. 8.5 g 6    Omega-3 Fatty Acids (FISH OIL) 1000 MG Cap capsule Take 1,000 mg by mouth 3 times a day with meals.      CINNAMON PO Take  by mouth.      cetirizine (ZYRTEC) 10 MG Tab Take 10 mg by mouth every day.      pantoprazole (PROTONIX) 20 MG tablet Take 1 Tablet by mouth every day. (Patient not taking: Reported on 5/8/2024) 30 Tablet 2     No current facility-administered medications on file prior to encounter.              Objective     /70 (BP Location: Right arm, Patient Position: Sitting, BP Cuff Size: Adult)   Pulse 82   Temp 36.9 °C (98.5  °F) (Temporal)   Ht 1.829 m (6')   Wt 84.4 kg (186 lb)   SpO2 100%   BMI 25.23 kg/m²      Physical Exam        No visits with results within 1 Day(s) from this visit.   Latest known visit with results is:   Hospital Outpatient Visit on 05/02/2024   Component Date Value Ref Range Status    CPK Total 05/02/2024 526 (H)  0 - 154 U/L Final    Ferritin 05/02/2024 569.0 (H)  22.0 - 322.0 ng/mL Final    Sodium 05/02/2024 143  135 - 145 mmol/L Final    Potassium 05/02/2024 4.3  3.6 - 5.5 mmol/L Final    Chloride 05/02/2024 105  96 - 112 mmol/L Final    Co2 05/02/2024 26  20 - 33 mmol/L Final    Anion Gap 05/02/2024 12.0  7.0 - 16.0 Final    Glucose 05/02/2024 107 (H)  65 - 99 mg/dL Final    Bun 05/02/2024 18  8 - 22 mg/dL Final    Creatinine 05/02/2024 0.98  0.50 - 1.40 mg/dL Final    Calcium 05/02/2024 9.8  8.5 - 10.5 mg/dL Final    Correct Calcium 05/02/2024 9.1  8.5 - 10.5 mg/dL Final    AST(SGOT) 05/02/2024 26  12 - 45 U/L Final    ALT(SGPT) 05/02/2024 26  2 - 50 U/L Final    Alkaline Phosphatase 05/02/2024 46  30 - 99 U/L Final    Total Bilirubin 05/02/2024 0.4  0.1 - 1.5 mg/dL Final    Albumin 05/02/2024 4.9  3.2 - 4.9 g/dL Final    Total Protein 05/02/2024 7.8  6.0 - 8.2 g/dL Final    Globulin 05/02/2024 2.9  1.9 - 3.5 g/dL Final    A-G Ratio 05/02/2024 1.7  g/dL Final    WBC 05/02/2024 5.2  4.8 - 10.8 K/uL Final    RBC 05/02/2024 5.24  4.70 - 6.10 M/uL Final    Hemoglobin 05/02/2024 14.6  14.0 - 18.0 g/dL Final    Hematocrit 05/02/2024 46.2  42.0 - 52.0 % Final    MCV 05/02/2024 88.2  81.4 - 97.8 fL Final    MCH 05/02/2024 27.9  27.0 - 33.0 pg Final    MCHC 05/02/2024 31.6 (L)  32.3 - 36.5 g/dL Final    Please note new reference range effective 05/22/2023.    RDW 05/02/2024 42.2  35.9 - 50.0 fL Final    Platelet Count 05/02/2024 294  164 - 446 K/uL Final    MPV 05/02/2024 9.6  9.0 - 12.9 fL Final    Neutrophils-Polys 05/02/2024 47.40  44.00 - 72.00 % Final    Lymphocytes 05/02/2024 40.50  22.00 - 41.00 % Final     Monocytes 05/02/2024 7.50  0.00 - 13.40 % Final    Eosinophils 05/02/2024 3.40  0.00 - 6.90 % Final    Basophils 05/02/2024 1.00  0.00 - 1.80 % Final    Immature Granulocytes 05/02/2024 0.20  0.00 - 0.90 % Final    Nucleated RBC 05/02/2024 0.00  0.00 - 0.20 /100 WBC Final    Please note new reference range effective 05/22/2023.    Neutrophils (Absolute) 05/02/2024 2.48  1.82 - 7.42 K/uL Final    Comment: Includes immature neutrophils, if present.  Please note new reference range effective 05/22/2023.      Lymphs (Absolute) 05/02/2024 2.12  1.00 - 4.80 K/uL Final    Monos (Absolute) 05/02/2024 0.39  0.00 - 0.85 K/uL Final    Eos (Absolute) 05/02/2024 0.18  0.00 - 0.51 K/uL Final    Baso (Absolute) 05/02/2024 0.05  0.00 - 0.12 K/uL Final    Immature Granulocytes (abs) 05/02/2024 0.01  0.00 - 0.11 K/uL Final    NRBC (Absolute) 05/02/2024 0.00  K/uL Final    Fasting Status 05/02/2024 Fasting   Final    GFR (CKD-EPI) 05/02/2024 104  >60 mL/min/1.73 m 2 Final    Comment: Estimated Glomerular Filtration Rate is calculated using  race neutral CKD-EPI 2021 equation per NKF-ASN recommendations.                        Latest Reference Range & Units Most Recent   WBC 4.8 - 10.8 K/uL 5.2  5/2/24 06:36   RBC 4.70 - 6.10 M/uL 5.24  5/2/24 06:36   Hemoglobin 14.0 - 18.0 g/dL 14.6  5/2/24 06:36   Hematocrit 42.0 - 52.0 % 46.2  5/2/24 06:36   MCV 81.4 - 97.8 fL 88.2  5/2/24 06:36   MCH 27.0 - 33.0 pg 27.9  5/2/24 06:36   MCHC 32.3 - 36.5 g/dL 31.6 (L)  5/2/24 06:36   RDW 35.9 - 50.0 fL 42.2  5/2/24 06:36   Platelet Count 164 - 446 K/uL 294  5/2/24 06:36   MPV 9.0 - 12.9 fL 9.6  5/2/24 06:36   Neutrophils-Polys 44.00 - 72.00 % 47.40  5/2/24 06:36   Neutrophils (Absolute) 1.82 - 7.42 K/uL 2.48  5/2/24 06:36   Lymphocytes 22.00 - 41.00 % 40.50  5/2/24 06:36   Lymphs (Absolute) 1.00 - 4.80 K/uL 2.12  5/2/24 06:36   Monocytes 0.00 - 13.40 % 7.50  5/2/24 06:36   Monos (Absolute) 0.00 - 0.85 K/uL 0.39  5/2/24 06:36   Eosinophils 0.00 -  6.90 % 3.40  5/2/24 06:36   Eos (Absolute) 0.00 - 0.51 K/uL 0.18  5/2/24 06:36   Basophils 0.00 - 1.80 % 1.00  5/2/24 06:36   Baso (Absolute) 0.00 - 0.12 K/uL 0.05  5/2/24 06:36   Immature Granulocytes 0.00 - 0.90 % 0.20  5/2/24 06:36   Immature Granulocytes (abs) 0.00 - 0.11 K/uL 0.01  5/2/24 06:36   Nucleated RBC 0.00 - 0.20 /100 WBC 0.00  5/2/24 06:36   NRBC (Absolute) K/uL 0.00  5/2/24 06:36   Sed Rate Westergren 0 - 20 mm/hour 4  9/8/21 06:32   Hgb Solubility  Not Performed  2/5/21 08:43   Hemoglobin A1 95.0 - 97.9 % 96.3  2/5/21 08:43   Hemoglobin A2 2.0 - 3.5 % 2.9  2/5/21 08:43   Hemoglobin F 0.0 - 2.1 % 0.8  2/5/21 08:43   Hemoglobin S 0.0 - 0.0 % 0.0  2/5/21 08:43   Hemaglobin C 0.0 - 0.0 % 0.0  2/5/21 08:43   Hemoglobin E 0.0 - 0.0 % 0.0  2/5/21 08:43   Hemoglobin Eval  See Note  2/5/21 08:43   Hemoglobin Other 0.0 - 0.0 % 0.0  2/5/21 08:43   Hemoglobin,Capillary Electrophoresis  Not Performed  2/5/21 08:43   Sodium 135 - 145 mmol/L 143  5/2/24 06:36   Potassium 3.6 - 5.5 mmol/L 4.3  5/2/24 06:36   Chloride 96 - 112 mmol/L 105  5/2/24 06:36   Co2 20 - 33 mmol/L 26  5/2/24 06:36   Anion Gap 7.0 - 16.0  12.0  5/2/24 06:36   Glucose 65 - 99 mg/dL 107 (H)  5/2/24 06:36   Bun 8 - 22 mg/dL 18  5/2/24 06:36   Creatinine 0.50 - 1.40 mg/dL 0.98  5/2/24 06:36   GFR (CKD-EPI) >60 mL/min/1.73 m 2 104  5/2/24 06:36   GFR If African American >60 mL/min/1.73 m 2 >60  6/11/21 14:02   GFR If Non African American >60 mL/min/1.73 m 2 >60  6/11/21 14:02   Calcium 8.5 - 10.5 mg/dL 9.8  5/2/24 06:36   Correct Calcium 8.5 - 10.5 mg/dL 9.1  5/2/24 06:36   AST(SGOT) 12 - 45 U/L 26  5/2/24 06:36   ALT(SGPT) 2 - 50 U/L 26  5/2/24 06:36   Alkaline Phosphatase 30 - 99 U/L 46  5/2/24 06:36   Total Bilirubin 0.1 - 1.5 mg/dL 0.4  5/2/24 06:36   Albumin 3.2 - 4.9 g/dL 4.9  5/2/24 06:36   Total Protein 6.0 - 8.2 g/dL 7.8  5/2/24 06:36   Globulin 1.9 - 3.5 g/dL 2.9  5/2/24 06:36   A-G Ratio g/dL 1.7  5/2/24 06:36   Phosphorus 2.5 - 4.5  mg/dL 3.8  7/17/21 11:17   Uric Acid 2.5 - 8.3 mg/dL 7.8  7/17/21 11:17   C Reactive Protein High Sensitive 0.0 - 7.5 mg/L 0.7  3/10/21 10:01   CPK Total 0 - 154 U/L 526 (H)  5/2/24 06:36   LDH Total 107 - 266 U/L 240  10/5/20 21:00   Lactic Acid 0.5 - 2.0 mmol/L 1.1  10/5/20 22:00   Osmolality Serum 278 - 298 mOsm/kg H2O 301 (H)  11/18/20 08:21   Iron 50 - 180 ug/dL 98  6/11/21 14:02   Total Iron Binding 250 - 450 ug/dL 365  6/11/21 14:02   % Saturation 15 - 55 % 27  6/11/21 14:02   Unsat Iron Binding 110 - 370 ug/dL 267  6/11/21 14:02   C282Y Mutation  Negative  12/18/20 08:44   H63D Mutation  Negative  12/18/20 08:44   S65C Mutation  Negative  12/18/20 08:44   Hemochrom Interp  See Note  12/18/20 08:44   Glycohemoglobin 4.0 - 5.6 % 5.9 (H)  6/11/21 14:02   Estim. Avg Glu mg/dL 123  6/11/21 14:02   Carnitine Esterified / Free (Ratio) 0.1 - 1.0 ratio 0.4  7/17/21 11:18   Carnitine, Free 25 - 60 umol/L 42  7/17/21 11:18   Fasting Status  Fasting  5/2/24 06:36   Cholesterol,Tot 100 - 199 mg/dL 212 (H)  11/18/20 08:21   Triglycerides 0 - 149 mg/dL 218 (H)  11/18/20 08:21   HDL >=40 mg/dL 49  11/18/20 08:21   LDL <100 mg/dL 119 (H)  11/18/20 08:21   POC Urine Drug Screen Comment  Negative  5/17/22 14:54   Troponin T 6 - 19 ng/L 9  10/5/20 21:00   Myoglobin, Serum 0 - 110 ng/mL 22  7/17/21 11:17   NT-proBNP 0 - 125 pg/mL 17  10/5/20 21:00   CK Macro Type 2 0 - 0 % 0  9/8/21 06:32   CPK3 Mm 96 - 100 % 100  9/8/21 06:32   CPK2 Mb 0 - 4 % 0  9/8/21 06:32   CK Macro Type 1 0 - 0 % 0  9/8/21 06:32   CPK1 Bb 0 - 0 % 0  9/8/21 06:32   Micro Alb Creat Ratio 0 - 30 mg/g see below  11/18/20 08:19   Creatinine, Urine mg/dL 170.81  11/18/20 08:19   Microalbumin, Urine Random mg/dL <1.2  11/18/20 08:19   Urobilinogen, Urine Negative  0.2  9/8/21 06:32   Occult Blood, IA Negative  Negative  12/18/20 07:50   C5-OH,3-OH Isovaleryl <=0.07 umol/L 0.04  7/17/21 11:18   C2 Acetyl 2.93 - 15.06 umol/L 8.02  7/17/21 11:18   C3 Propionyl  <=0.82 umol/L 0.61  7/17/21 11:18   C4 Iso-/Butyryl <=0.42 umol/L 0.30  7/17/21 11:18   C18 Stearoyl <=0.06 umol/L 0.04  7/17/21 11:18   C5, Isovaleryl, 2 Mebutryrl <=0.24 umol/L 0.13  7/17/21 11:18   C16:1-OH,3-OH-Palmitoleyl <=0.02 umol/L 0.01  7/17/21 11:18   C6 Hexanoyl <=0.12 umol/L 0.07  7/17/21 11:18   C16-OH,3-OH Palmitoleyl <=0.02 umol/L 0.01  7/17/21 11:18   C16:1 Palmitoleyl <=0.04 umol/L 0.01  7/17/21 11:18   C14:1-OH,3-OH Tetradecenoyl <=0.04 umol/L 0.01  7/17/21 11:18   C8:1 Octenoyl <=0.60 umol/L 0.30  7/17/21 11:18   C8 Octanoyl <=0.22 umol/L 0.07  7/17/21 11:18   C5-DC Glutaryl <=0.23 umol/L 0.07  7/17/21 11:18   C14-OH,3-OH Tetradecanoyl <=0.01 umol/L 0.01  7/17/21 11:18   C12-OH,3-OH Dodecanoyl <=0.02 umol/L <0.01  7/17/21 11:18   C10 Decanoyl <=0.33 umol/L 0.11  7/17/21 11:18   C10:1 Decenoyl <=0.27 umol/L 0.12  7/17/21 11:18   C12:1 Dodecenoyl <=0.13 umol/L 0.05  7/17/21 11:18   C12 Dodecanoyl <=0.13 umol/L 0.06  7/17/21 11:18   C14:2 Tetradecadienoyl <=0.08 umol/L 0.02  7/17/21 11:18   C14:1 Tetradecenoyl <=0.15 umol/L 0.04  7/17/21 11:18   C16 Palmitoyl <=0.12 umol/L 0.08  7/17/21 11:18   C18:2 Linoleyl <=0.10 umol/L 0.05  7/17/21 11:18   C18:1 Oleoyl <=0.18 umol/L 0.09  7/17/21 11:18   C18-OH,3-OH Stearoyl <=0.02 umol/L <0.01  7/17/21 11:18   C18:1-OH, 3-OH-Oleyl <=0.02 umol/L 0.01  7/17/21 11:18   C18:2-OH, 3-OH-Linoleyl <=0.02 umol/L <0.01  7/17/21 11:18   Carnitine, Esterified 5 - 29 umol/L 16  7/17/21 11:18   Carnitine, Total 34 - 86 umol/L 58  7/17/21 11:18   Acylcarnitine Plasma Interp  Normal  7/17/21 11:18   C14 Tetradecanoyl <=0.06 umol/L 0.02  7/17/21 11:18   POC Breathalizer 0.00 - 0.01 Percent 0.00  5/17/22 13:36   Breath Alcohol Comment  0.00  5/17/22 13:36   INR 0.87 - 1.13  0.81 (L)  3/10/21 10:01   Subscriber ID  [facility]  3/30/19 10:51   Event Name  Patient Visit  3/30/19 10:51   Location of Screening  [facility]  3/30/19 10:51   [facility]  3/30/19 10:51   State  [place]  3/30/19 10:51    Fasting?  Yes  3/30/19 10:51   Tobacco Use?  No  3/30/19 10:51   Diabetes?  No  3/30/19 10:51   Hypertension?  No  3/30/19 10:51   Height in 72  3/30/19 10:51   Weight lbs 168  3/30/19 10:51   Systolic BP <120 mmHg 116  3/30/19 10:51   Diastolic BP <80 mmHg 54  3/30/19 10:51   Body Fat % % 10.3  3/30/19 10:51   PT 12.0 - 14.6 sec 11.4 (L)  3/10/21 10:01   APTT 24.7 - 36.0 sec 33.0  3/10/21 10:01   D-Dimer Screen 0.00 - 0.50 ug/mL (FEU) 0.72 (H)  10/5/20 22:00   Factor VIII 45.0 - 145.0 % 50.0  3/10/21 10:01   Inhibitor Indicated  No  3/10/21 10:01   Dilute Rick Viper Venom Johnathon 28.0 - 48.0 sec 34.4  3/10/21 10:01   Lupus Anticoagulant  Not Present  3/10/21 10:01   VWF Activity 51 - 215 % 57  2/5/21 08:43   vWF Antigen 52 - 214 % 46 (L)  2/5/21 08:43   Color  Yellow  9/8/21 06:32   Character  Clear  9/8/21 06:32   Specific Gravity <1.035  1.021  9/8/21 06:32   Ph 5.0 - 8.0  5.5  9/8/21 06:32   Glucose Negative mg/dL Negative  9/8/21 06:32   Ketones Negative mg/dL Negative  9/8/21 06:32   Bilirubin Negative  Negative  9/8/21 06:32   Occult Blood Negative  Negative  9/8/21 06:32   Protein Negative mg/dL Negative  9/8/21 06:32   Nitrite Negative  Negative  9/8/21 06:32   Leukocyte Esterase Negative  Negative  9/8/21 06:32   Micro Urine Req  see below  9/8/21 06:32   POC Color Negative  YELLOW  2/12/17 16:02   POC Appearance Negative  CLEAR  2/12/17 16:02   POC Specific Gravity <1.005 - >1.030  1.005  2/12/17 16:02   POC Urine PH 5.0 - 8.0  6.5  2/12/17 16:02   POC Glucose Negative mg/dL NEG  2/12/17 16:02   POC Ketones Negative mg/dL NEG  2/12/17 16:02   POC Protein Negative mg/dL NEG  2/12/17 16:02   POC Nitrites Negative  NEG  2/12/17 16:02   POC Leukocyte Esterase Negative  NEG  2/12/17 16:02   POC Blood Negative  TRACE  2/12/17 16:02   POC Bilirubin Negative mg/dL NEG  2/12/17 16:02   POC Urobiligen Negative (0.2) mg/dL NEG  2/12/17 16:02   Ferritin 22.0 - 322.0 ng/mL 569.0 (H)  5/2/24 06:36   Folate -Folic Acid  >4.0 ng/mL 9.2  11/18/20 08:21   Haptoglobin 30 - 200 mg/dL 140  2/5/21 08:43   Procalcitonin <0.25 ng/mL 0.07  10/5/20 22:00   TSH 0.380 - 5.330 uIU/mL 1.720  6/11/21 14:02   COVID Order Status  Received  10/6/20 12:27   HIV Ag/Ab Combo Assay Non Reactive  Non-Reactive  2/5/21 08:43   Influenza virus A RNA Negative, Invalid  Negative  12/13/23 13:45   Influenza virus B, PCR Negative, Invalid  Negative  12/13/23 13:45   RSV, PCR Negative, Invalid  Negative  12/13/23 13:45   SARS-COV ANTIGEN ROGELIO Not-Detected  NotDetected  10/6/20 17:20   SARS-CoV-2 by PCR Negative, Invalid  Positive !  12/13/23 13:45   SARS-CoV-2 Source  Nasal Swab  10/6/20 17:20   EER Allergent Panel, IgE Immun DAVE  See Note  9/13/22 07:14   Stat C-Reactive Protein 0.00 - 0.75 mg/dL <0.30  6/11/21 14:02   Testosterone,Total 175 - 781 ng/dL 138 (L)  6/11/21 14:02   Antinuclear Antibody None Detected  None Detected  3/10/21 10:01   C. trachomatis by PCR Negative  Negative  2/12/17 18:26   N. gonorrhoeae by PCR Negative  Negative  2/12/17 18:26   Ova and Parasite, Fecal Interpretation Negative  Negative  3/15/21 09:16   POC Group A Strep, PCR Not Detected, Invalid  Not Detected  12/13/23 13:45   Significant Indicator  NEG  10/5/20 20:45   Site  PERIPHERAL  10/5/20 20:45   Source  BLD  10/5/20 20:45   Source  Urine  2/12/17 18:26   Heparin Anti-Xa U/mL <0.1  3/10/21 10:01   (L): Data is abnormally low  (H): Data is abnormally high  !: Data is abnormal  Rpt: View report in Results Review for more information         Assessment & Plan   1. Elevated CPK  MR-ABDOMEN-WITH & W/O      2. Elevated ferritin level  MR-ABDOMEN-WITH & W/O      3. Encounter for hematology follow-up            1.  Elevated CPK: Chronic, negative cardiology and neurology workups. Elevated this visit, some bilateral abdominal and scapular pain reports, no c/o dark urine, continue to monitor.     2.  Elevated Ferritin: Negative for hematochromatosis. CBC is essentially normal and  stable, ferritin is rising. Per discussion with Dr. Arrington, advised quantitative liver MRI for iron concentration, imaging ordered.      Will query about any additional workup recommended. In the interim, will continue to monitor, patient will repeat CBC, ferritin, CPK, CMP in 6 months and return for re-evaluation, sooner as needed.

## 2024-06-25 ENCOUNTER — HOSPITAL ENCOUNTER (OUTPATIENT)
Dept: RADIOLOGY | Facility: MEDICAL CENTER | Age: 34
End: 2024-06-25
Attending: NURSE PRACTITIONER
Payer: COMMERCIAL

## 2024-06-25 DIAGNOSIS — R79.89 ELEVATED FERRITIN LEVEL: Chronic | ICD-10-CM

## 2024-06-25 DIAGNOSIS — R74.8 ELEVATED CPK: ICD-10-CM

## 2024-06-25 PROCEDURE — 74183 MRI ABD W/O CNTR FLWD CNTR: CPT

## 2024-06-25 PROCEDURE — 700117 HCHG RX CONTRAST REV CODE 255: Performed by: NURSE PRACTITIONER

## 2024-06-25 PROCEDURE — A9579 GAD-BASE MR CONTRAST NOS,1ML: HCPCS | Performed by: NURSE PRACTITIONER

## 2024-06-25 RX ADMIN — GADOTERIDOL 15 ML: 279.3 INJECTION, SOLUTION INTRAVENOUS at 16:16

## 2024-11-01 DIAGNOSIS — R74.8 ELEVATED CPK: ICD-10-CM

## 2024-11-01 DIAGNOSIS — R79.89 ELEVATED FERRITIN LEVEL: ICD-10-CM

## 2024-11-06 ENCOUNTER — HOSPITAL ENCOUNTER (OUTPATIENT)
Dept: LAB | Facility: MEDICAL CENTER | Age: 34
End: 2024-11-06
Attending: NURSE PRACTITIONER
Payer: COMMERCIAL

## 2024-11-06 DIAGNOSIS — R74.8 ELEVATED CPK: ICD-10-CM

## 2024-11-06 DIAGNOSIS — R79.89 ELEVATED FERRITIN LEVEL: ICD-10-CM

## 2024-11-06 LAB
BASOPHILS # BLD AUTO: 0.6 % (ref 0–1.8)
BASOPHILS # BLD: 0.03 K/UL (ref 0–0.12)
EOSINOPHIL # BLD AUTO: 0.1 K/UL (ref 0–0.51)
EOSINOPHIL NFR BLD: 1.9 % (ref 0–6.9)
ERYTHROCYTE [DISTWIDTH] IN BLOOD BY AUTOMATED COUNT: 41.4 FL (ref 35.9–50)
HCT VFR BLD AUTO: 44.5 % (ref 42–52)
HGB BLD-MCNC: 13.8 G/DL (ref 14–18)
IMM GRANULOCYTES # BLD AUTO: 0.01 K/UL (ref 0–0.11)
IMM GRANULOCYTES NFR BLD AUTO: 0.2 % (ref 0–0.9)
LYMPHOCYTES # BLD AUTO: 2.34 K/UL (ref 1–4.8)
LYMPHOCYTES NFR BLD: 43.7 % (ref 22–41)
MCH RBC QN AUTO: 27.1 PG (ref 27–33)
MCHC RBC AUTO-ENTMCNC: 31 G/DL (ref 32.3–36.5)
MCV RBC AUTO: 87.4 FL (ref 81.4–97.8)
MONOCYTES # BLD AUTO: 0.45 K/UL (ref 0–0.85)
MONOCYTES NFR BLD AUTO: 8.4 % (ref 0–13.4)
NEUTROPHILS # BLD AUTO: 2.42 K/UL (ref 1.82–7.42)
NEUTROPHILS NFR BLD: 45.2 % (ref 44–72)
NRBC # BLD AUTO: 0 K/UL
NRBC BLD-RTO: 0 /100 WBC (ref 0–0.2)
PLATELET # BLD AUTO: 294 K/UL (ref 164–446)
PMV BLD AUTO: 9.4 FL (ref 9–12.9)
RBC # BLD AUTO: 5.09 M/UL (ref 4.7–6.1)
WBC # BLD AUTO: 5.4 K/UL (ref 4.8–10.8)

## 2024-11-06 PROCEDURE — 85025 COMPLETE CBC W/AUTO DIFF WBC: CPT

## 2024-11-06 PROCEDURE — 82728 ASSAY OF FERRITIN: CPT

## 2024-11-06 PROCEDURE — 83540 ASSAY OF IRON: CPT

## 2024-11-06 PROCEDURE — 83550 IRON BINDING TEST: CPT

## 2024-11-06 PROCEDURE — 36415 COLL VENOUS BLD VENIPUNCTURE: CPT

## 2024-11-07 ENCOUNTER — PATIENT MESSAGE (OUTPATIENT)
Dept: HEMATOLOGY ONCOLOGY | Facility: MEDICAL CENTER | Age: 34
End: 2024-11-07
Payer: COMMERCIAL

## 2024-11-07 LAB
FERRITIN SERPL-MCNC: 649 NG/ML (ref 22–322)
IRON SATN MFR SERPL: 17 % (ref 15–55)
IRON SERPL-MCNC: 63 UG/DL (ref 50–180)
TIBC SERPL-MCNC: 369 UG/DL (ref 250–450)
UIBC SERPL-MCNC: 306 UG/DL (ref 110–370)

## 2024-11-08 ENCOUNTER — APPOINTMENT (OUTPATIENT)
Dept: HEMATOLOGY ONCOLOGY | Facility: MEDICAL CENTER | Age: 34
End: 2024-11-08
Attending: NURSE PRACTITIONER
Payer: COMMERCIAL

## 2024-12-03 DIAGNOSIS — Z30.09 VASECTOMY EVALUATION: ICD-10-CM
